# Patient Record
Sex: FEMALE | Race: BLACK OR AFRICAN AMERICAN | NOT HISPANIC OR LATINO | URBAN - METROPOLITAN AREA
[De-identification: names, ages, dates, MRNs, and addresses within clinical notes are randomized per-mention and may not be internally consistent; named-entity substitution may affect disease eponyms.]

---

## 2017-05-03 ENCOUNTER — EMERGENCY (EMERGENCY)
Facility: HOSPITAL | Age: 67
LOS: 1 days | Discharge: HOME | End: 2017-05-03
Attending: EMERGENCY MEDICINE
Payer: MEDICARE

## 2017-05-03 VITALS
OXYGEN SATURATION: 98 % | TEMPERATURE: 97 F | WEIGHT: 250 LBS | DIASTOLIC BLOOD PRESSURE: 115 MMHG | HEART RATE: 73 BPM | RESPIRATION RATE: 20 BRPM | SYSTOLIC BLOOD PRESSURE: 183 MMHG | HEIGHT: 62 IN

## 2017-05-03 DIAGNOSIS — Z90.10 ACQUIRED ABSENCE OF UNSPECIFIED BREAST AND NIPPLE: Chronic | ICD-10-CM

## 2017-05-03 DIAGNOSIS — I10 ESSENTIAL (PRIMARY) HYPERTENSION: ICD-10-CM

## 2017-05-03 DIAGNOSIS — Z79.82 LONG TERM (CURRENT) USE OF ASPIRIN: ICD-10-CM

## 2017-05-03 DIAGNOSIS — Z98.89 OTHER SPECIFIED POSTPROCEDURAL STATES: Chronic | ICD-10-CM

## 2017-05-03 DIAGNOSIS — E16.2 HYPOGLYCEMIA, UNSPECIFIED: ICD-10-CM

## 2017-05-03 DIAGNOSIS — E11.649 TYPE 2 DIABETES MELLITUS WITH HYPOGLYCEMIA WITHOUT COMA: ICD-10-CM

## 2017-05-03 DIAGNOSIS — Z90.89 ACQUIRED ABSENCE OF OTHER ORGANS: ICD-10-CM

## 2017-05-03 DIAGNOSIS — Z90.49 ACQUIRED ABSENCE OF OTHER SPECIFIED PARTS OF DIGESTIVE TRACT: ICD-10-CM

## 2017-05-03 DIAGNOSIS — E78.00 PURE HYPERCHOLESTEROLEMIA, UNSPECIFIED: ICD-10-CM

## 2017-05-03 DIAGNOSIS — Z95.5 PRESENCE OF CORONARY ANGIOPLASTY IMPLANT AND GRAFT: ICD-10-CM

## 2017-05-03 DIAGNOSIS — E78.5 HYPERLIPIDEMIA, UNSPECIFIED: ICD-10-CM

## 2017-05-03 DIAGNOSIS — I25.10 ATHEROSCLEROTIC HEART DISEASE OF NATIVE CORONARY ARTERY WITHOUT ANGINA PECTORIS: ICD-10-CM

## 2017-05-03 DIAGNOSIS — Z95.1 PRESENCE OF AORTOCORONARY BYPASS GRAFT: Chronic | ICD-10-CM

## 2017-05-03 PROCEDURE — 99283 EMERGENCY DEPT VISIT LOW MDM: CPT

## 2017-05-03 NOTE — ED ADULT NURSE NOTE - CHIEF COMPLAINT QUOTE
pt johnny from doctors office, became AMS at office, blood sugar was 22, received 1 amp d 50 by ems, 224 in triage

## 2017-05-03 NOTE — ED ADULT NURSE REASSESSMENT NOTE - NS ED NURSE REASSESS COMMENT FT1
pt cleared to be discharged hopme. instructed to follow up with PMd and to return to the er if her symptoms worsens or reoccurs. pt stated her understanding

## 2017-05-03 NOTE — ED ADULT NURSE NOTE - PSH
S/P appendectomy    S/P breast reconstruction, bilateral    S/P CABG x 4    S/P cardiac cath  with stents  S/P mastectomy    S/P tonsillectomy

## 2017-05-03 NOTE — ED ADULT NURSE NOTE - OBJECTIVE STATEMENT
pt states she felt dizzy and could not think straight this morning.  pt reports blood glucose of 22 at her dr's office

## 2017-05-05 NOTE — ED PROVIDER NOTE - PHYSICAL EXAMINATION
neuro: CN II - XII intact, EOMI, PERRL, no papilledema, 5/5 muscle strength x 4 extremities, no sensory deficits, 2+ dtr globally, negative babinski, no ataxic gait, normal JJ and FNT, normal romberg

## 2017-05-05 NOTE — ED PROVIDER NOTE - PMH
Breast cancer    Coronary artery disease    CVA (cerebral vascular accident)    Diabetes mellitus    Glaucoma    HTN (hypertension)    Hypercholesterolemia    Hypothyroid

## 2017-05-05 NOTE — ED PROVIDER NOTE - MEDICAL DECISION MAKING DETAILS
resolved hypoglycemia in nad tolerating po fluids return to ed for intractable HA persistent vomiting or new onset motor or sensory deficits.

## 2018-05-23 ENCOUNTER — OUTPATIENT (OUTPATIENT)
Dept: OUTPATIENT SERVICES | Facility: HOSPITAL | Age: 68
LOS: 1 days | End: 2018-05-23
Payer: MEDICARE

## 2018-05-23 DIAGNOSIS — Z98.89 OTHER SPECIFIED POSTPROCEDURAL STATES: Chronic | ICD-10-CM

## 2018-05-23 DIAGNOSIS — Z95.1 PRESENCE OF AORTOCORONARY BYPASS GRAFT: Chronic | ICD-10-CM

## 2018-05-23 DIAGNOSIS — Z90.10 ACQUIRED ABSENCE OF UNSPECIFIED BREAST AND NIPPLE: Chronic | ICD-10-CM

## 2018-05-23 DIAGNOSIS — R07.9 CHEST PAIN, UNSPECIFIED: ICD-10-CM

## 2018-05-23 PROCEDURE — A9500: CPT

## 2018-05-23 PROCEDURE — 78452 HT MUSCLE IMAGE SPECT MULT: CPT | Mod: 26

## 2018-05-23 PROCEDURE — 93016 CV STRESS TEST SUPVJ ONLY: CPT

## 2018-05-23 PROCEDURE — 78452 HT MUSCLE IMAGE SPECT MULT: CPT

## 2018-05-23 PROCEDURE — 93018 CV STRESS TEST I&R ONLY: CPT

## 2018-05-23 PROCEDURE — 93017 CV STRESS TEST TRACING ONLY: CPT

## 2018-11-05 ENCOUNTER — INPATIENT (INPATIENT)
Facility: HOSPITAL | Age: 68
LOS: 3 days | Discharge: HOME | DRG: 286 | End: 2018-11-09
Attending: INTERNAL MEDICINE | Admitting: INTERNAL MEDICINE
Payer: MEDICARE

## 2018-11-05 VITALS
DIASTOLIC BLOOD PRESSURE: 135 MMHG | SYSTOLIC BLOOD PRESSURE: 186 MMHG | RESPIRATION RATE: 20 BRPM | HEART RATE: 61 BPM | TEMPERATURE: 99 F | HEIGHT: 62 IN | WEIGHT: 203.93 LBS | OXYGEN SATURATION: 100 %

## 2018-11-05 DIAGNOSIS — Z95.1 PRESENCE OF AORTOCORONARY BYPASS GRAFT: Chronic | ICD-10-CM

## 2018-11-05 DIAGNOSIS — Z98.89 OTHER SPECIFIED POSTPROCEDURAL STATES: Chronic | ICD-10-CM

## 2018-11-05 DIAGNOSIS — Z90.10 ACQUIRED ABSENCE OF UNSPECIFIED BREAST AND NIPPLE: Chronic | ICD-10-CM

## 2018-11-05 LAB
ALBUMIN SERPL ELPH-MCNC: 3.8 G/DL — SIGNIFICANT CHANGE UP (ref 3.3–5.2)
ALP SERPL-CCNC: 104 U/L — SIGNIFICANT CHANGE UP (ref 40–120)
ALT FLD-CCNC: 10 U/L — SIGNIFICANT CHANGE UP
ANION GAP SERPL CALC-SCNC: 13 MMOL/L — SIGNIFICANT CHANGE UP (ref 5–17)
APPEARANCE UR: CLEAR — SIGNIFICANT CHANGE UP
APTT BLD: 36 SEC — SIGNIFICANT CHANGE UP (ref 27.5–36.3)
AST SERPL-CCNC: 16 U/L — SIGNIFICANT CHANGE UP
BASOPHILS # BLD AUTO: 0 K/UL — SIGNIFICANT CHANGE UP (ref 0–0.2)
BASOPHILS NFR BLD AUTO: 0.3 % — SIGNIFICANT CHANGE UP (ref 0–2)
BILIRUB SERPL-MCNC: 0.2 MG/DL — LOW (ref 0.4–2)
BILIRUB UR-MCNC: NEGATIVE — SIGNIFICANT CHANGE UP
BUN SERPL-MCNC: 21 MG/DL — HIGH (ref 8–20)
CALCIUM SERPL-MCNC: 9.5 MG/DL — SIGNIFICANT CHANGE UP (ref 8.6–10.2)
CHLORIDE SERPL-SCNC: 105 MMOL/L — SIGNIFICANT CHANGE UP (ref 98–107)
CO2 SERPL-SCNC: 25 MMOL/L — SIGNIFICANT CHANGE UP (ref 22–29)
COLOR SPEC: YELLOW — SIGNIFICANT CHANGE UP
CREAT SERPL-MCNC: 0.89 MG/DL — SIGNIFICANT CHANGE UP (ref 0.5–1.3)
DIFF PNL FLD: NEGATIVE — SIGNIFICANT CHANGE UP
EOSINOPHIL # BLD AUTO: 0.1 K/UL — SIGNIFICANT CHANGE UP (ref 0–0.5)
EOSINOPHIL NFR BLD AUTO: 1.6 % — SIGNIFICANT CHANGE UP (ref 0–6)
GLUCOSE SERPL-MCNC: 206 MG/DL — HIGH (ref 70–115)
GLUCOSE UR QL: NEGATIVE MG/DL — SIGNIFICANT CHANGE UP
HCT VFR BLD CALC: 37 % — SIGNIFICANT CHANGE UP (ref 37–47)
HGB BLD-MCNC: 12.2 G/DL — SIGNIFICANT CHANGE UP (ref 12–16)
INR BLD: 0.99 RATIO — SIGNIFICANT CHANGE UP (ref 0.88–1.16)
KETONES UR-MCNC: NEGATIVE — SIGNIFICANT CHANGE UP
LEUKOCYTE ESTERASE UR-ACNC: NEGATIVE — SIGNIFICANT CHANGE UP
LYMPHOCYTES # BLD AUTO: 2.3 K/UL — SIGNIFICANT CHANGE UP (ref 1–4.8)
LYMPHOCYTES # BLD AUTO: 32.6 % — SIGNIFICANT CHANGE UP (ref 20–55)
MAGNESIUM SERPL-MCNC: 2 MG/DL — SIGNIFICANT CHANGE UP (ref 1.6–2.6)
MCHC RBC-ENTMCNC: 29 PG — SIGNIFICANT CHANGE UP (ref 27–31)
MCHC RBC-ENTMCNC: 33 G/DL — SIGNIFICANT CHANGE UP (ref 32–36)
MCV RBC AUTO: 88.1 FL — SIGNIFICANT CHANGE UP (ref 81–99)
MONOCYTES # BLD AUTO: 0.4 K/UL — SIGNIFICANT CHANGE UP (ref 0–0.8)
MONOCYTES NFR BLD AUTO: 5.9 % — SIGNIFICANT CHANGE UP (ref 3–10)
NEUTROPHILS # BLD AUTO: 4.1 K/UL — SIGNIFICANT CHANGE UP (ref 1.8–8)
NEUTROPHILS NFR BLD AUTO: 59.5 % — SIGNIFICANT CHANGE UP (ref 37–73)
NITRITE UR-MCNC: NEGATIVE — SIGNIFICANT CHANGE UP
PH UR: 6 — SIGNIFICANT CHANGE UP (ref 5–8)
PLATELET # BLD AUTO: 140 K/UL — LOW (ref 150–400)
POTASSIUM SERPL-MCNC: 3.6 MMOL/L — SIGNIFICANT CHANGE UP (ref 3.5–5.3)
POTASSIUM SERPL-SCNC: 3.6 MMOL/L — SIGNIFICANT CHANGE UP (ref 3.5–5.3)
PROT SERPL-MCNC: 7.2 G/DL — SIGNIFICANT CHANGE UP (ref 6.6–8.7)
PROT UR-MCNC: 30 MG/DL
PROTHROM AB SERPL-ACNC: 11.4 SEC — SIGNIFICANT CHANGE UP (ref 10–12.9)
RBC # BLD: 4.2 M/UL — LOW (ref 4.4–5.2)
RBC # FLD: 14.7 % — SIGNIFICANT CHANGE UP (ref 11–15.6)
SODIUM SERPL-SCNC: 143 MMOL/L — SIGNIFICANT CHANGE UP (ref 135–145)
SP GR SPEC: 1.01 — SIGNIFICANT CHANGE UP (ref 1.01–1.02)
TROPONIN T SERPL-MCNC: <0.01 NG/ML — SIGNIFICANT CHANGE UP (ref 0–0.06)
UROBILINOGEN FLD QL: NEGATIVE MG/DL — SIGNIFICANT CHANGE UP
WBC # BLD: 7 K/UL — SIGNIFICANT CHANGE UP (ref 4.8–10.8)
WBC # FLD AUTO: 7 K/UL — SIGNIFICANT CHANGE UP (ref 4.8–10.8)

## 2018-11-05 PROCEDURE — 93459 L HRT ART/GRFT ANGIO: CPT | Mod: 26

## 2018-11-05 PROCEDURE — 93010 ELECTROCARDIOGRAM REPORT: CPT

## 2018-11-05 PROCEDURE — 71045 X-RAY EXAM CHEST 1 VIEW: CPT | Mod: 26

## 2018-11-05 PROCEDURE — 76937 US GUIDE VASCULAR ACCESS: CPT | Mod: 26

## 2018-11-05 PROCEDURE — 99223 1ST HOSP IP/OBS HIGH 75: CPT | Mod: GC

## 2018-11-05 PROCEDURE — 99152 MOD SED SAME PHYS/QHP 5/>YRS: CPT

## 2018-11-05 PROCEDURE — 99284 EMERGENCY DEPT VISIT MOD MDM: CPT

## 2018-11-05 RX ORDER — HYDRALAZINE HCL 50 MG
10 TABLET ORAL ONCE
Qty: 0 | Refills: 0 | Status: COMPLETED | OUTPATIENT
Start: 2018-11-05 | End: 2018-11-05

## 2018-11-05 RX ORDER — CARVEDILOL PHOSPHATE 80 MG/1
25 CAPSULE, EXTENDED RELEASE ORAL ONCE
Qty: 0 | Refills: 0 | Status: COMPLETED | OUTPATIENT
Start: 2018-11-05 | End: 2018-11-05

## 2018-11-05 RX ORDER — AMLODIPINE BESYLATE 2.5 MG/1
5 TABLET ORAL ONCE
Qty: 0 | Refills: 0 | Status: COMPLETED | OUTPATIENT
Start: 2018-11-05 | End: 2018-11-05

## 2018-11-05 RX ADMIN — Medication 10 MILLIGRAM(S): at 21:37

## 2018-11-05 RX ADMIN — AMLODIPINE BESYLATE 5 MILLIGRAM(S): 2.5 TABLET ORAL at 22:56

## 2018-11-05 RX ADMIN — Medication 10 MILLIGRAM(S): at 22:57

## 2018-11-05 RX ADMIN — CARVEDILOL PHOSPHATE 25 MILLIGRAM(S): 80 CAPSULE, EXTENDED RELEASE ORAL at 22:56

## 2018-11-05 NOTE — H&P ADULT - NSHPSOCIALHISTORY_GEN_ALL_CORE
Quit smoking 30-40 years ago. Smoked 3/4 ppd prior to this. Denies use of alcohol or rec drugs.  Lives with her , daughter and grandchildren  At her baseline she ambulates independently

## 2018-11-05 NOTE — ED ADULT NURSE NOTE - OBJECTIVE STATEMENT
assumed care of patient at 2100, alert and oriented x4, c/o chest  pain since yesterday. Patient denies n/v/d, denies pain radiating. Pt placed on CM, sinus bradycardia noted to monitor. HR 55, Dr Armando aware. Patient hypertensive, Dr Armando aware and at bedside. Patient asymptomatic, denies headache blurry vision or dizziness.

## 2018-11-05 NOTE — ED ADULT TRIAGE NOTE - CHIEF COMPLAINT QUOTE
Patient arrived to ED today with c/o chest pain since this AM and an episode yesterday AM that was relieved with Aspirin.

## 2018-11-05 NOTE — ED ADULT NURSE NOTE - CHPI ED NUR SYMPTOMS NEG
no congestion/no vomiting/no back pain/no shortness of breath/no nausea/no diaphoresis/no dizziness/no fever/no syncope/no chills

## 2018-11-05 NOTE — ED ADULT NURSE NOTE - CHIEF COMPLAINT QUOTE
Patient arrived to ED today with c/o chest pain since this AM and an episode yesterday AM that was relieved with Aspirin.
Yes

## 2018-11-05 NOTE — ED STATDOCS - OBJECTIVE STATEMENT
Telemedicine assessment was conducted (using real time 2 way audio-video technology) by Dr. Cj Saeed located at [address] [place] [address]  ++++++++++++++++++++++++  Pertinent patient history and initial plan:   69 y/o F, with DM, HTN, HLD, hypothyroid, CVA, CABG, MI (2013), and 4 coronary artery stents, currently on Plavix and 81mg Aspirin, presents to the ED c/o constant chest pain, onset yesterday.  Pain is non radiating.  Pt states that she was getting ready for [employer] when pain began.  Pt states that her BP is not usually as high as it is today.  Denies SOB, N/V, dizziness, or visual changes.    Cardiolgist: Dr. RENEE ([facility]) Telemedicine assessment was conducted (using real time 2 way audio-video technology) by Dr. Cj Saeed located at [address] [place] [address]  ++++++++++++++++++++++++  Pertinent patient history and initial plan:   67 y/o F, with DM, HTN, HLD, hypothyroid, CVA, CABG, MI (2013), and 4 coronary artery stents, currently on Plavix and 81mg Aspirin, presents to the ED c/o constant chest pain, onset yesterday.  Pain is non radiating.  Pt states that she was getting ready for [employer] when pain began.  Pt states that her BP is not usually as high as it is today.  Denies SOB, N/V, dizziness, or visual changes.    Cardiolgist: Dr. RENEE ([facility])      ekg, labs, cxr  Patient seen by me in intake for initial assessment and ordering. Physician on site to follow results and further evaluate and treat patient.

## 2018-11-05 NOTE — H&P ADULT - NSHPREVIEWOFSYSTEMS_GEN_ALL_CORE
She denies any recent fever, sick contacts, vomiting, diarrhea, constipation, diarrhea, or hematuria.

## 2018-11-05 NOTE — H&P ADULT - HISTORY OF PRESENT ILLNESS
67 yo female with PMH of HTN, CAD, CABG (2013), HLD, Hypothyroidism, DMII on insulin, CVA (2004, 2013) w/ residual R lower facial droop, Breast Cancer s/p mastectomy 1984, glaucoma and blindness in Left eye presents with CP which began yesterday morning. She states the pain was 8/10, center of her chest, dull and stabby, moved to her shoulder and neck, worsened with movement, no alleviating factors, unsure if it is similar to her prior CAD pain. She stated at the time her heart was also beating very fast and she was sweating profusely for about 3 mins. She admitted to nausea but did not vomit. She took a 325mg Aspirin which helped her pain and then went to Religious. Since then she has been having this chest pain and now decided to come to the hospital, brought by her daughter. Patient states she walks 1/2 a block and gets SOB, gradually worsening over the last few months, but denies orthopnea and PND. She does state her ankles get swollen at the end of the day. She states she had a HA when she came in but now it has resolved. Both her and her daughter noticed her speech was labored today. She denies any weakness or numbness anywhere else. She also has chronic neuropathy in b/l feet, worse on R.  She also states she began feeling burning on urination today.  Patient also complains of having pain w/ each meal, has to eat small bites at a time. States she has lots of GERD, but denies any trouble swallowing. She had an endoscopy and colonoscopy last year and her GI was planning on repeating them this year to further evaluate the odynophagia but was pending cardiac clearance.

## 2018-11-05 NOTE — ED ADULT NURSE REASSESSMENT NOTE - NS ED NURSE REASSESS COMMENT FT1
BP remains elevated, Dr Armando made aware, Medications given per Md order, Pt now c/o 6/10 headache. Dr Armando made aware. Safety maintained, Will continue to monitor.

## 2018-11-05 NOTE — ED PROVIDER NOTE - OBJECTIVE STATEMENT
69 yo female with significant cardiac history presents for evaluation of constant left sided chest pain since the morning of 11/4/18. Patient states she took aspirin which helped but the pain did not resolve. Patient states pain is worse with movement and palpation.  PMD: Zach  Cardio: Nazia

## 2018-11-05 NOTE — H&P ADULT - ASSESSMENT
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  Admit to Resident service under care of Dr Cantrell  Admit to Step Down unit  Activity: ambulate with assistance  Diet: DASH/TLC, consistent carb    Hypertensive Emergency  Elevated SBP of 223  R/o end organ damage with CP, HA and new onset dysarthria  EKG: NSR w/ minor ST elevation in lead III, not seen on repeat EKG  Trop neg x1, will trend  s/p Norvasc 5mg PO, Coreg 25mg PO, Hydralazine 10mg IV x2  Continue home meds, Lasix 40mg BID, Coreg at half dose (12.5mg BID), Norvasc 10mg QD  Hydralazine and HCTZ held  Cardio consult pending, [facility]    New onset Dysarthria  Started >5 hours ago  Prior hx of CVA  CT Head: neg for acute ischemic or hemorrhagic infarct,   No other focal neurological deficits  Outside of window for tPA  MRI Head pending    MENEZES possible CHF  Gradually worsening, exercise tolerance down to 1/2 block  LE edema  Stress test from 05/2018: Post stress LVEF 76%  TTE pending    Diabetes Mellitus  Continue home meds, Lantus reduced to 15U  Premeal insulin 4U  Accuchecks premeal + QHS  Moderate insulin SS  HBA1c pending    Hiatal Hernia  Moderate in size as seen on CT Abd  Pain with ingestion of food  Continue Protonix 40mg QD  Surgery consult?    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  Admit to Resident service under care of Dr Cantrell  Admit to Step Down unit  Activity: ambulate with assistance  Diet: DASH/TLC, consistent carb    Hypertensive Emergency  Elevated SBP of 223  R/o end organ damage with CP, HA and new onset dysarthria  EKG: NSR w/ minor ST elevation in lead III, not seen on repeat EKG  Trop neg x1, will trend  s/p Norvasc 5mg PO, Coreg 25mg PO, Hydralazine 10mg IV x2  Continue home meds, Lasix 40mg BID, Coreg at half dose (12.5mg BID), Norvasc 10mg QD  Hydralazine and HCTZ held  Cardio consult pending, [facility]    New onset Dysarthria  Started >5 hours ago  Prior hx of CVA  CT Head: neg for acute ischemic or hemorrhagic infarct,   No other focal neurological deficits  Outside of window for tPA  MRI Head pending    MENEZES possible CHF  Gradually worsening, exercise tolerance down to 1/2 block  LE edema  Stress test from 05/2018: Post stress LVEF 76%  TTE pending    Diabetes Mellitus  Continue home meds, Lantus reduced to 15U, Premeal insulin 4U  Accuchecks premeal + QHS  Moderate insulin SS  HBA1c pending  B/l peripheral diabetic neuropathy, continue Gabapentin 100mg BID    Hiatal Hernia  Moderate in size as seen on CT Abd  Pain with ingestion of food  Continue Protonix 40mg QD  Surgery consult?    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  Admit to Resident service under care of Dr Cantrell  Admit to Step Down unit  Activity: ambulate with assistance  Diet: DASH/TLC, consistent carb    Hypertensive Emergency  Elevated SBP of 223  R/o end organ damage with CP, HA and new onset dysarthria  EKG: NSR w/ minor ST elevation in lead III, not seen on repeat EKG  Trop neg x1, will trend  s/p Norvasc 5mg PO, Coreg 25mg PO, Hydralazine 10mg IV x2  Continue home meds, Lasix 40mg BID, Coreg at half dose (12.5mg BID), Norvasc 10mg QD  Hydralazine and HCTZ held  Cardio consult pending, [facility]    New onset Dysarthria  Started >5 hours ago  Prior hx of CVA  CT Head: neg for acute ischemic or hemorrhagic infarct,   No other focal neurological deficits  Outside of window for tPA  MRI Head pending  Neuro consult pending, [facility]    MENEZES possible CHF  Gradually worsening, exercise tolerance down to 1/2 block  LE edema  Stress test from 05/2018: Post stress LVEF 76%  TTE pending    Diabetes Mellitus  Continue home meds, Lantus reduced to 15U, Premeal insulin 4U  Accuchecks premeal + QHS  Moderate insulin SS  HBA1c pending  B/l peripheral diabetic neuropathy, continue Gabapentin 100mg BID    Hiatal Hernia  Moderate in size as seen on CT Abd  Pain with ingestion of food  Continue Protonix 40mg QD  Surgery consult?    CAD s/p CABG  Continue ASA and Plavix    Hypothyroidism  Continue home med, Synthroid 137mcg QD  TSH pending    Preventative Measure  Heparin 5000U Q8h

## 2018-11-06 DIAGNOSIS — I10 ESSENTIAL (PRIMARY) HYPERTENSION: ICD-10-CM

## 2018-11-06 LAB
ANION GAP SERPL CALC-SCNC: 12 MMOL/L — SIGNIFICANT CHANGE UP (ref 5–17)
BASOPHILS # BLD AUTO: 0 K/UL — SIGNIFICANT CHANGE UP (ref 0–0.2)
BASOPHILS NFR BLD AUTO: 0.3 % — SIGNIFICANT CHANGE UP (ref 0–2)
BUN SERPL-MCNC: 17 MG/DL — SIGNIFICANT CHANGE UP (ref 8–20)
CALCIUM SERPL-MCNC: 9.4 MG/DL — SIGNIFICANT CHANGE UP (ref 8.6–10.2)
CHLORIDE SERPL-SCNC: 104 MMOL/L — SIGNIFICANT CHANGE UP (ref 98–107)
CHOLEST SERPL-MCNC: 186 MG/DL — SIGNIFICANT CHANGE UP (ref 110–199)
CK SERPL-CCNC: 94 U/L — SIGNIFICANT CHANGE UP (ref 25–170)
CK SERPL-CCNC: 96 U/L — SIGNIFICANT CHANGE UP (ref 25–170)
CO2 SERPL-SCNC: 25 MMOL/L — SIGNIFICANT CHANGE UP (ref 22–29)
CREAT SERPL-MCNC: 0.86 MG/DL — SIGNIFICANT CHANGE UP (ref 0.5–1.3)
EOSINOPHIL # BLD AUTO: 0.1 K/UL — SIGNIFICANT CHANGE UP (ref 0–0.5)
EOSINOPHIL NFR BLD AUTO: 1.1 % — SIGNIFICANT CHANGE UP (ref 0–6)
GLUCOSE BLDC GLUCOMTR-MCNC: 135 MG/DL — HIGH (ref 70–99)
GLUCOSE BLDC GLUCOMTR-MCNC: 189 MG/DL — HIGH (ref 70–99)
GLUCOSE BLDC GLUCOMTR-MCNC: 261 MG/DL — HIGH (ref 70–99)
GLUCOSE BLDC GLUCOMTR-MCNC: 69 MG/DL — LOW (ref 70–99)
GLUCOSE SERPL-MCNC: 207 MG/DL — HIGH (ref 70–115)
HBA1C BLD-MCNC: 8.9 % — HIGH (ref 4–5.6)
HCT VFR BLD CALC: 37.5 % — SIGNIFICANT CHANGE UP (ref 37–47)
HDLC SERPL-MCNC: 66 MG/DL — SIGNIFICANT CHANGE UP
HGB BLD-MCNC: 12.2 G/DL — SIGNIFICANT CHANGE UP (ref 12–16)
LIPID PNL WITH DIRECT LDL SERPL: 111 MG/DL — SIGNIFICANT CHANGE UP
LYMPHOCYTES # BLD AUTO: 2.1 K/UL — SIGNIFICANT CHANGE UP (ref 1–4.8)
LYMPHOCYTES # BLD AUTO: 29.8 % — SIGNIFICANT CHANGE UP (ref 20–55)
MAGNESIUM SERPL-MCNC: 1.9 MG/DL — SIGNIFICANT CHANGE UP (ref 1.6–2.6)
MCHC RBC-ENTMCNC: 28.5 PG — SIGNIFICANT CHANGE UP (ref 27–31)
MCHC RBC-ENTMCNC: 32.5 G/DL — SIGNIFICANT CHANGE UP (ref 32–36)
MCV RBC AUTO: 87.6 FL — SIGNIFICANT CHANGE UP (ref 81–99)
MONOCYTES # BLD AUTO: 0.4 K/UL — SIGNIFICANT CHANGE UP (ref 0–0.8)
MONOCYTES NFR BLD AUTO: 5.2 % — SIGNIFICANT CHANGE UP (ref 3–10)
NEUTROPHILS # BLD AUTO: 4.5 K/UL — SIGNIFICANT CHANGE UP (ref 1.8–8)
NEUTROPHILS NFR BLD AUTO: 63.5 % — SIGNIFICANT CHANGE UP (ref 37–73)
PHOSPHATE SERPL-MCNC: 3 MG/DL — SIGNIFICANT CHANGE UP (ref 2.4–4.7)
PLATELET # BLD AUTO: 145 K/UL — LOW (ref 150–400)
POTASSIUM SERPL-MCNC: 3.6 MMOL/L — SIGNIFICANT CHANGE UP (ref 3.5–5.3)
POTASSIUM SERPL-SCNC: 3.6 MMOL/L — SIGNIFICANT CHANGE UP (ref 3.5–5.3)
RBC # BLD: 4.28 M/UL — LOW (ref 4.4–5.2)
RBC # FLD: 14.8 % — SIGNIFICANT CHANGE UP (ref 11–15.6)
SODIUM SERPL-SCNC: 141 MMOL/L — SIGNIFICANT CHANGE UP (ref 135–145)
TOTAL CHOLESTEROL/HDL RATIO MEASUREMENT: 3 RATIO — LOW (ref 3.3–7.1)
TRIGL SERPL-MCNC: 45 MG/DL — SIGNIFICANT CHANGE UP (ref 10–200)
TROPONIN T SERPL-MCNC: <0.01 NG/ML — SIGNIFICANT CHANGE UP (ref 0–0.06)
TROPONIN T SERPL-MCNC: <0.01 NG/ML — SIGNIFICANT CHANGE UP (ref 0–0.06)
TSH SERPL-MCNC: 8.92 UIU/ML — HIGH (ref 0.27–4.2)
WBC # BLD: 7.1 K/UL — SIGNIFICANT CHANGE UP (ref 4.8–10.8)
WBC # FLD AUTO: 7.1 K/UL — SIGNIFICANT CHANGE UP (ref 4.8–10.8)

## 2018-11-06 PROCEDURE — 99223 1ST HOSP IP/OBS HIGH 75: CPT

## 2018-11-06 PROCEDURE — 93010 ELECTROCARDIOGRAM REPORT: CPT

## 2018-11-06 PROCEDURE — 99233 SBSQ HOSP IP/OBS HIGH 50: CPT | Mod: GC

## 2018-11-06 PROCEDURE — 70450 CT HEAD/BRAIN W/O DYE: CPT | Mod: 26

## 2018-11-06 PROCEDURE — 71275 CT ANGIOGRAPHY CHEST: CPT | Mod: 26

## 2018-11-06 PROCEDURE — 74176 CT ABD & PELVIS W/O CONTRAST: CPT | Mod: 26,59

## 2018-11-06 PROCEDURE — 74174 CTA ABD&PLVS W/CONTRAST: CPT | Mod: 26

## 2018-11-06 PROCEDURE — 99222 1ST HOSP IP/OBS MODERATE 55: CPT

## 2018-11-06 RX ORDER — LEVOTHYROXINE SODIUM 125 MCG
137 TABLET ORAL DAILY
Qty: 0 | Refills: 0 | Status: DISCONTINUED | OUTPATIENT
Start: 2018-11-06 | End: 2018-11-08

## 2018-11-06 RX ORDER — INSULIN LISPRO 100/ML
VIAL (ML) SUBCUTANEOUS
Qty: 0 | Refills: 0 | Status: DISCONTINUED | OUTPATIENT
Start: 2018-11-06 | End: 2018-11-09

## 2018-11-06 RX ORDER — GLUCAGON INJECTION, SOLUTION 0.5 MG/.1ML
1 INJECTION, SOLUTION SUBCUTANEOUS ONCE
Qty: 0 | Refills: 0 | Status: DISCONTINUED | OUTPATIENT
Start: 2018-11-06 | End: 2018-11-09

## 2018-11-06 RX ORDER — NITROGLYCERIN 6.5 MG
0.5 CAPSULE, EXTENDED RELEASE ORAL DAILY
Qty: 0 | Refills: 0 | Status: DISCONTINUED | OUTPATIENT
Start: 2018-11-06 | End: 2018-11-06

## 2018-11-06 RX ORDER — INSULIN GLARGINE 100 [IU]/ML
15 INJECTION, SOLUTION SUBCUTANEOUS AT BEDTIME
Qty: 0 | Refills: 0 | Status: DISCONTINUED | OUTPATIENT
Start: 2018-11-06 | End: 2018-11-07

## 2018-11-06 RX ORDER — PANTOPRAZOLE SODIUM 20 MG/1
40 TABLET, DELAYED RELEASE ORAL
Qty: 0 | Refills: 0 | Status: DISCONTINUED | OUTPATIENT
Start: 2018-11-06 | End: 2018-11-09

## 2018-11-06 RX ORDER — AMLODIPINE BESYLATE 2.5 MG/1
10 TABLET ORAL DAILY
Qty: 0 | Refills: 0 | Status: DISCONTINUED | OUTPATIENT
Start: 2018-11-06 | End: 2018-11-09

## 2018-11-06 RX ORDER — SODIUM CHLORIDE 9 MG/ML
500 INJECTION INTRAMUSCULAR; INTRAVENOUS; SUBCUTANEOUS ONCE
Qty: 0 | Refills: 0 | Status: COMPLETED | OUTPATIENT
Start: 2018-11-06 | End: 2018-11-06

## 2018-11-06 RX ORDER — DEXTROSE 50 % IN WATER 50 %
25 SYRINGE (ML) INTRAVENOUS ONCE
Qty: 0 | Refills: 0 | Status: DISCONTINUED | OUTPATIENT
Start: 2018-11-06 | End: 2018-11-09

## 2018-11-06 RX ORDER — ACETAMINOPHEN 500 MG
650 TABLET ORAL ONCE
Qty: 0 | Refills: 0 | Status: COMPLETED | OUTPATIENT
Start: 2018-11-06 | End: 2018-11-06

## 2018-11-06 RX ORDER — GABAPENTIN 400 MG/1
100 CAPSULE ORAL
Qty: 0 | Refills: 0 | Status: DISCONTINUED | OUTPATIENT
Start: 2018-11-06 | End: 2018-11-09

## 2018-11-06 RX ORDER — HEPARIN SODIUM 5000 [USP'U]/ML
5000 INJECTION INTRAVENOUS; SUBCUTANEOUS EVERY 8 HOURS
Qty: 0 | Refills: 0 | Status: DISCONTINUED | OUTPATIENT
Start: 2018-11-06 | End: 2018-11-09

## 2018-11-06 RX ORDER — FUROSEMIDE 40 MG
40 TABLET ORAL
Qty: 0 | Refills: 0 | Status: DISCONTINUED | OUTPATIENT
Start: 2018-11-06 | End: 2018-11-07

## 2018-11-06 RX ORDER — NITROGLYCERIN 6.5 MG
0.5 CAPSULE, EXTENDED RELEASE ORAL ONCE
Qty: 0 | Refills: 0 | Status: DISCONTINUED | OUTPATIENT
Start: 2018-11-06 | End: 2018-11-06

## 2018-11-06 RX ORDER — CLOPIDOGREL BISULFATE 75 MG/1
75 TABLET, FILM COATED ORAL DAILY
Qty: 0 | Refills: 0 | Status: DISCONTINUED | OUTPATIENT
Start: 2018-11-06 | End: 2018-11-06

## 2018-11-06 RX ORDER — MORPHINE SULFATE 50 MG/1
1 CAPSULE, EXTENDED RELEASE ORAL ONCE
Qty: 0 | Refills: 0 | Status: DISCONTINUED | OUTPATIENT
Start: 2018-11-06 | End: 2018-11-06

## 2018-11-06 RX ORDER — CARVEDILOL PHOSPHATE 80 MG/1
12.5 CAPSULE, EXTENDED RELEASE ORAL EVERY 12 HOURS
Qty: 0 | Refills: 0 | Status: DISCONTINUED | OUTPATIENT
Start: 2018-11-06 | End: 2018-11-09

## 2018-11-06 RX ORDER — LOSARTAN POTASSIUM 100 MG/1
50 TABLET, FILM COATED ORAL DAILY
Qty: 0 | Refills: 0 | Status: DISCONTINUED | OUTPATIENT
Start: 2018-11-06 | End: 2018-11-09

## 2018-11-06 RX ORDER — MAGNESIUM OXIDE 400 MG ORAL TABLET 241.3 MG
400 TABLET ORAL
Qty: 0 | Refills: 0 | Status: COMPLETED | OUTPATIENT
Start: 2018-11-06 | End: 2018-11-06

## 2018-11-06 RX ORDER — DEXTROSE 50 % IN WATER 50 %
15 SYRINGE (ML) INTRAVENOUS ONCE
Qty: 0 | Refills: 0 | Status: DISCONTINUED | OUTPATIENT
Start: 2018-11-06 | End: 2018-11-09

## 2018-11-06 RX ORDER — DEXTROSE 50 % IN WATER 50 %
12.5 SYRINGE (ML) INTRAVENOUS ONCE
Qty: 0 | Refills: 0 | Status: DISCONTINUED | OUTPATIENT
Start: 2018-11-06 | End: 2018-11-09

## 2018-11-06 RX ORDER — ASPIRIN/CALCIUM CARB/MAGNESIUM 324 MG
81 TABLET ORAL DAILY
Qty: 0 | Refills: 0 | Status: DISCONTINUED | OUTPATIENT
Start: 2018-11-06 | End: 2018-11-09

## 2018-11-06 RX ORDER — IBUPROFEN 200 MG
600 TABLET ORAL ONCE
Qty: 0 | Refills: 0 | Status: COMPLETED | OUTPATIENT
Start: 2018-11-06 | End: 2018-11-06

## 2018-11-06 RX ORDER — ATORVASTATIN CALCIUM 80 MG/1
40 TABLET, FILM COATED ORAL AT BEDTIME
Qty: 0 | Refills: 0 | Status: DISCONTINUED | OUTPATIENT
Start: 2018-11-06 | End: 2018-11-08

## 2018-11-06 RX ORDER — PANTOPRAZOLE SODIUM 20 MG/1
40 TABLET, DELAYED RELEASE ORAL DAILY
Qty: 0 | Refills: 0 | Status: DISCONTINUED | OUTPATIENT
Start: 2018-11-06 | End: 2018-11-06

## 2018-11-06 RX ORDER — SODIUM CHLORIDE 9 MG/ML
1000 INJECTION, SOLUTION INTRAVENOUS
Qty: 0 | Refills: 0 | Status: DISCONTINUED | OUTPATIENT
Start: 2018-11-06 | End: 2018-11-09

## 2018-11-06 RX ORDER — HYDRALAZINE HCL 50 MG
10 TABLET ORAL EVERY 6 HOURS
Qty: 0 | Refills: 0 | Status: DISCONTINUED | OUTPATIENT
Start: 2018-11-06 | End: 2018-11-09

## 2018-11-06 RX ORDER — INSULIN LISPRO 100/ML
2 VIAL (ML) SUBCUTANEOUS
Qty: 0 | Refills: 0 | Status: DISCONTINUED | OUTPATIENT
Start: 2018-11-06 | End: 2018-11-07

## 2018-11-06 RX ORDER — ACETAMINOPHEN 500 MG
650 TABLET ORAL EVERY 6 HOURS
Qty: 0 | Refills: 0 | Status: DISCONTINUED | OUTPATIENT
Start: 2018-11-06 | End: 2018-11-09

## 2018-11-06 RX ADMIN — PANTOPRAZOLE SODIUM 40 MILLIGRAM(S): 20 TABLET, DELAYED RELEASE ORAL at 18:04

## 2018-11-06 RX ADMIN — CARVEDILOL PHOSPHATE 12.5 MILLIGRAM(S): 80 CAPSULE, EXTENDED RELEASE ORAL at 18:04

## 2018-11-06 RX ADMIN — CLOPIDOGREL BISULFATE 75 MILLIGRAM(S): 75 TABLET, FILM COATED ORAL at 11:06

## 2018-11-06 RX ADMIN — MAGNESIUM OXIDE 400 MG ORAL TABLET 400 MILLIGRAM(S): 241.3 TABLET ORAL at 10:39

## 2018-11-06 RX ADMIN — Medication 650 MILLIGRAM(S): at 11:06

## 2018-11-06 RX ADMIN — AMLODIPINE BESYLATE 10 MILLIGRAM(S): 2.5 TABLET ORAL at 06:32

## 2018-11-06 RX ADMIN — Medication 137 MICROGRAM(S): at 06:32

## 2018-11-06 RX ADMIN — Medication 650 MILLIGRAM(S): at 10:51

## 2018-11-06 RX ADMIN — MAGNESIUM OXIDE 400 MG ORAL TABLET 400 MILLIGRAM(S): 241.3 TABLET ORAL at 18:04

## 2018-11-06 RX ADMIN — GABAPENTIN 100 MILLIGRAM(S): 400 CAPSULE ORAL at 06:32

## 2018-11-06 RX ADMIN — GABAPENTIN 100 MILLIGRAM(S): 400 CAPSULE ORAL at 18:04

## 2018-11-06 RX ADMIN — MORPHINE SULFATE 1 MILLIGRAM(S): 50 CAPSULE, EXTENDED RELEASE ORAL at 03:35

## 2018-11-06 RX ADMIN — HEPARIN SODIUM 5000 UNIT(S): 5000 INJECTION INTRAVENOUS; SUBCUTANEOUS at 06:32

## 2018-11-06 RX ADMIN — SODIUM CHLORIDE 2000 MILLILITER(S): 9 INJECTION INTRAMUSCULAR; INTRAVENOUS; SUBCUTANEOUS at 22:15

## 2018-11-06 RX ADMIN — MORPHINE SULFATE 1 MILLIGRAM(S): 50 CAPSULE, EXTENDED RELEASE ORAL at 03:18

## 2018-11-06 RX ADMIN — Medication 2: at 08:32

## 2018-11-06 RX ADMIN — Medication 2 UNIT(S): at 08:32

## 2018-11-06 RX ADMIN — PANTOPRAZOLE SODIUM 40 MILLIGRAM(S): 20 TABLET, DELAYED RELEASE ORAL at 11:06

## 2018-11-06 RX ADMIN — Medication 40 MILLIGRAM(S): at 18:04

## 2018-11-06 RX ADMIN — Medication 81 MILLIGRAM(S): at 11:06

## 2018-11-06 RX ADMIN — ATORVASTATIN CALCIUM 40 MILLIGRAM(S): 80 TABLET, FILM COATED ORAL at 22:16

## 2018-11-06 RX ADMIN — LOSARTAN POTASSIUM 50 MILLIGRAM(S): 100 TABLET, FILM COATED ORAL at 06:32

## 2018-11-06 RX ADMIN — Medication 40 MILLIGRAM(S): at 06:32

## 2018-11-06 NOTE — ED ADULT NURSE REASSESSMENT NOTE - NS ED NURSE REASSESS COMMENT FT1
BP improved, order for stat nitro paste received, Dr Gordon called to notify of BP prior to nitro placement. AS per MD Gordon, do not give nitro paste. Order discontinued.

## 2018-11-06 NOTE — PROGRESS NOTE ADULT - SUBJECTIVE AND OBJECTIVE BOX
Patient with recurrent symptoms of chest pain.     Patent grafts.     Patient's symptoms appear to be GI related.     Will plan for PPI bid.     DC plavix.     Moderate hiatal hernia. Will have patient be seen and evaluated for outpatient hiatal hernia surgery.

## 2018-11-06 NOTE — PROGRESS NOTE ADULT - SUBJECTIVE AND OBJECTIVE BOX
Nurse Practitioner Progress note:     HPI:  69 yo female with PMH of HTN, CAD, CABG (2013), HLD, Hypothyroidism, DMII on insulin, CVA (2004, 2013) w/ residual R lower facial droop,  Pt presented to cardiac cath lab for [facility]         T(C): 36.7 (11-06-18 @ 15:01), Max: 37 (11-05-18 @ 19:17)  HR: 72 (11-06-18 @ 15:01) (55 - 75)  BP: 137/78 (11-06-18 @ 15:01) (137/78 - 223/99)  RR: 21 (11-06-18 @ 15:01) (16 - 21)  SpO2: 97% (11-06-18 @ 11:16) (96% - 100%)    PHYSICAL EXAM:  Neurologic: Non-focal, AxOx3.  No neuro deficits  Vascular: Peripheral pulses palpable 2+ bilaterally  Procedure Site: RFA closed with Mynx device with hemostasis       PROCEDURE RESULTS:  [facility] patent grafts of all 5 bypasses   Non-obstructive disease   Noted to have Large Hiatal hernia likely cause of discomfort      ASSESSMENT/PLAN: 	  -VS, labs, diet, activity as per post cath orders  -IV hydration  -Encourage PO fluids  -Continue current medications  - Increase Protonix to 40 BID   -Plan of care  and MD and hospitalist   service   -Post cath instructions reviewed with pt., pt. verbalizes and understands instructions  -Follow-up with attending

## 2018-11-06 NOTE — OCCUPATIONAL THERAPY INITIAL EVALUATION ADULT - VISUAL ACUITY
pt denies changes with vision pt reports left eye blindness from diabetes (pt has had for many years); pt denies current changes with vision

## 2018-11-06 NOTE — CONSULT NOTE ADULT - SUBJECTIVE AND OBJECTIVE BOX
[facility]                                        Neurology at [place]                                  Shreyas Mccoy, & Shyam                                      [address]                                           [place], [place], [address]                                                [phone]        CC: Slurred speech.    HISTORY:  The patient is a 68y Female who presented with chest pain. She was noted to have some slurring of her speech initially. This was mild to moderate in intensity. It has resolved and she reports her speech is back to normal. There was no associated motor or sensory change.     She has a prior history of stroke with some right sided difficulty. This largely improved but she has some residual facial asymmetry.   She ambulates without an assistive devise at baseline.     PAST MEDICAL & SURGICAL HISTORY:  Breast cancer  Glaucoma  CVA (cerebral vascular accident)  Diabetes mellitus  Hypothyroid  Hypercholesterolemia  Coronary artery disease  HTN (hypertension)  S/P tonsillectomy  S/P appendectomy  S/P cardiac cath: with stents  S/P CABG x 4  S/P breast reconstruction, bilateral  S/P mastectomy    MEDICATION PRIOR TO ADMISSION:  Aspirin, Atorvastatin, Carvedilol, Plavix, Insulin, Pantoprazole, Levothyroxine, Losartan-HCTZ, Lasix, Neurontin, Potassium.     MEDICATIONS  (STANDING):  amLODIPine   Tablet 10 milliGRAM(s) Oral daily  aspirin  chewable 81 milliGRAM(s) Oral daily  atorvastatin 40 milliGRAM(s) Oral at bedtime  carvedilol 12.5 milliGRAM(s) Oral every 12 hours  clopidogrel Tablet 75 milliGRAM(s) Oral daily  dextrose 5%. 1000 milliLiter(s) (50 mL/Hr) IV Continuous <Continuous>  furosemide    Tablet 40 milliGRAM(s) Oral two times a day  gabapentin 100 milliGRAM(s) Oral two times a day  heparin  Injectable 5000 Unit(s) SubCutaneous every 8 hours  insulin glargine Injectable (LANTUS) 15 Unit(s) SubCutaneous at bedtime  insulin lispro (HumaLOG) corrective regimen sliding scale   SubCutaneous three times a day before meals  insulin lispro Injectable (HumaLOG) 2 Unit(s) SubCutaneous three times a day before meals  levothyroxine 137 MICROGram(s) Oral daily  losartan 50 milliGRAM(s) Oral daily  magnesium oxide 400 milliGRAM(s) Oral three times a day with meals  pantoprazole  Injectable 40 milliGRAM(s) IV Push daily    MEDICATIONS  (PRN):  acetaminophen   Tablet .. 650 milliGRAM(s) Oral every 6 hours PRN Mild Pain (1 - 3)  dextrose 40% Gel 15 Gram(s) Oral once PRN Blood Glucose LESS THAN 70 milliGRAM(s)/deciliter  glucagon  Injectable 1 milliGRAM(s) IntraMuscular once PRN Glucose LESS THAN 70 milligrams/deciliter  hydrALAZINE Injectable 10 milliGRAM(s) IV Push every 6 hours PRN SBP above 175      Allergies  No Known Allergies    SOCIAL HISTORY:  Non smoker.     FAMILY HISTORY:  Mother CVA (now )  Father .  No other history of stroke.     ROS:  Constitutional: The patient denies fevers or weight changes.  Neuro: As per HPI.  Eyes: Denies blurry vision.  Ears/nose/throat: Denies Tinnitus.   Cardiac: Denies chest pain. Denies palpitations.  Respiratory: Denies shortness of breath.  GI: Denies abdominal pain, nausea, or vomiting.  : Denies change in urinary pattern.  Integumentary: Denies rash.  Psych: Denies recent mood changes.  Heme: denies easy bleeding/bruising.    Exam:  Vital Signs Last 24 Hrs  T(C): 36.7 (2018 08:00), Max: 37 (2018 19:17)  T(F): 98.1 (2018 08:00), Max: 98.6 (2018 19:17)  HR: 64 (2018 08:00) (55 - 75)  BP: 150/79 (2018 08:00) (150/79 - 223/99)  RR: 18 (2018 08:00) (16 - 20)  SpO2: 97% (2018 08:00) (96% - 100%)  General: NAD.   Carotid bruits absent.     Mental status: The patient is awake, alert, and fully oriented. There is no aphasia.    Cranial nerves: There is no papilledema. Pupils react symmetrically to light. There is no visual field deficit to confrontation. Extraocular motion is full with no nystagmus. There is no ptosis. Facial sensation is intact. Facial musculature is asymmetric with a depression of the right nasolabial fold. Palate elevates symmetrically. Tongue is midline.    Motor: There is normal bulk and tone.  Strength is 5/5 in the right arm and leg.   Strength is 5/5 in the left arm and leg.    Sensation: Decreased in the feet.    Reflexes: Trace throughout and plantar responses are flexor.    Cerebellar: There is no dysmetria on finger to nose testing.    LABS:                         12.2   7.1   )-----------( 145      ( 2018 04:40 )             37.5       11-    141  |  104  |  17.0  ----------------------------<  207<H>  3.6   |  25.0  |  0.86    Ca    9.4      2018 04:40  Phos  3.0     -  Mg     1.9         TPro  7.2  /  Alb  3.8  /  TBili  0.2<L>  /  DBili  x   /  AST  16  /  ALT  10  /  AlkPhos  104  11-05      PT/INR - ( 2018 21:41 )   PT: 11.4 sec;   INR: 0.99 ratio         PTT - ( 2018 21:41 )  PTT:36.0 sec    RADIOLOGY   CT head images reviewed (and concur with report): There is no acute pathology.   Small hypodensity left Corona and thalamus consistent with old infarct.

## 2018-11-06 NOTE — OCCUPATIONAL THERAPY INITIAL EVALUATION ADULT - ADDITIONAL COMMENTS
Pt lives in house with 3 JORGE and no steps inside; bedroom and bathroom are on main level. Bathroom has bathtub with curtains. Pt owns RW, cane, commode, shower chair. Pt is right handed. Pt drives.

## 2018-11-06 NOTE — PROGRESS NOTE ADULT - SUBJECTIVE AND OBJECTIVE BOX
Patient is a 68y old  Female who presents with a chief complaint of Chest pain (06 Nov 2018 02:33)    OVERNIGHT EVENTS:  Patient seen and examined at bedside. Patient reports she feels a little better with the chest pain now 6/10 but still sharp and located at the center of her chest. She also complains of epigastric pain mostly with eating. She is alerted she will have to be NPO in case they decide to do a procedure tomorrow morning.     Vitals  T(C): 36.7 (11-06-18 @ 02:01), Max: 37 (11-05-18 @ 19:17)  HR: 62 (11-06-18 @ 03:14) (55 - 75)  BP: 168/81 (11-06-18 @ 03:14) (152/77 - 223/99)  RR: 18 (11-06-18 @ 03:14) (18 - 20)  SpO2: 98% (11-06-18 @ 03:14) (96% - 100%)    Physical Exam:   GENERAL: NAD, well-groomed, well-developed  	HEAD:  Atraumatic, Normocephalic  	EYES: EOMI, PERRLA, conjunctiva and sclera clear  	ENMT: No tonsillar erythema, exudates, or enlargement; Moist mucous membranes, Dentures in place, No lesions  	NECK: Supple, No JVD, Normal thyroid  	NERVOUS SYSTEM:  Alert & Oriented X3, Good concentration; Motor Strength 5/5 B/L upper and lower extremities; Mild dysarthria, Right partial lower face paralysis; CNs grossly except for the ones mentioned  	RESP: Clear to auscultation bilaterally; No rales, rhonchi, or wheezing  	CVS: Regular rate and rhythm; AS murmur, No rubs, or gallops, Midline scar, Chest pain reproducible with palpation  	GI: Large 5-6cm cystic mass under left breast, LArge but Soft abdomen, mildly tender to palpation throughout, Bowel sounds present  	EXTREMITIES:  2+ Peripheral Pulses, No clubbing, cyanosis, or edema, onychomycosis in all fingernails, swollen and pink coloration of peripheral digits of the hand  	LYMPH: No cervical or supraclavicular lymphadenopathy noted  SKIN: No rashes or lesions    Labs  CBC Full  -  ( 06 Nov 2018 04:40 )  WBC Count : 7.1 K/uL  Hemoglobin : 12.2 g/dL  Hematocrit : 37.5 %  Platelet Count - Automated : 145 K/uL  Mean Cell Volume : 87.6 fl  Mean Cell Hemoglobin : 28.5 pg  Mean Cell Hemoglobin Concentration : 32.5 g/dL  Auto Neutrophil # : 4.5 K/uL  Auto Lymphocyte # : 2.1 K/uL  Auto Monocyte # : 0.4 K/uL  Auto Eosinophil # : 0.1 K/uL  Auto Basophil # : 0.0 K/uL  Auto Neutrophil % : 63.5 %  Auto Lymphocyte % : 29.8 %  Auto Monocyte % : 5.2 %  Auto Eosinophil % : 1.1 %  Auto Basophil % : 0.3 %    11-06    141  |  104  |  17.0  ----------------------------<  207<H>  3.6   |  25.0  |  0.86    Ca    9.4      06 Nov 2018 04:40  Phos  3.0     11-06  Mg     1.9     11-06    TPro  7.2  /  Alb  3.8  /  TBili  0.2<L>  /  DBili  x   /  AST  16  /  ALT  10  /  AlkPhos  104  11-05    Troponin T, Serum (11.06.18 @ 04:40)    Troponin T, Serum: <0.01 ng/mL    MEDICATIONS  (STANDING):  amLODIPine   Tablet 10 milliGRAM(s) Oral daily  aspirin  chewable 81 milliGRAM(s) Oral daily  atorvastatin 40 milliGRAM(s) Oral at bedtime  carvedilol 12.5 milliGRAM(s) Oral every 12 hours  clopidogrel Tablet 75 milliGRAM(s) Oral daily  dextrose 5%. 1000 milliLiter(s) (50 mL/Hr) IV Continuous <Continuous>  dextrose 50% Injectable 12.5 Gram(s) IV Push once  dextrose 50% Injectable 25 Gram(s) IV Push once  dextrose 50% Injectable 25 Gram(s) IV Push once  furosemide    Tablet 40 milliGRAM(s) Oral two times a day  gabapentin 100 milliGRAM(s) Oral two times a day  heparin  Injectable 5000 Unit(s) SubCutaneous every 8 hours  insulin glargine Injectable (LANTUS) 15 Unit(s) SubCutaneous at bedtime  insulin lispro (HumaLOG) corrective regimen sliding scale   SubCutaneous three times a day before meals  insulin lispro Injectable (HumaLOG) 2 Unit(s) SubCutaneous three times a day before meals  levothyroxine 137 MICROGram(s) Oral daily  losartan 50 milliGRAM(s) Oral daily  pantoprazole  Injectable 40 milliGRAM(s) IV Push daily    MEDICATIONS  (PRN):  dextrose 40% Gel 15 Gram(s) Oral once PRN Blood Glucose LESS THAN 70 milliGRAM(s)/deciliter  glucagon  Injectable 1 milliGRAM(s) IntraMuscular once PRN Glucose LESS THAN 70 milligrams/deciliter  hydrALAZINE Injectable 10 milliGRAM(s) IV Push every 6 hours PRN SBP above 175 Patient is a 68y old  Female who presents with a chief complaint of Chest pain (06 Nov 2018 02:33)    OVERNIGHT EVENTS:  Patient seen and examined at bedside. Patient reports she feels a little better with the chest pain now 6/10 but still sharp and located at the center of her chest. She also complains of epigastric pain mostly with eating. She is alerted she will have to be NPO in case they decide to do a procedure tomorrow morning.     ROS: No light headedness/dizziness, difficulty breathing/cough, fevers/chills, abdominal pain, n/v, diarrhea/constipation, dysuria or increased urinary frequency. All other ROS negative     Vitals  T(C): 36.7 (11-06-18 @ 02:01), Max: 37 (11-05-18 @ 19:17)  HR: 62 (11-06-18 @ 03:14) (55 - 75)  BP: 168/81 (11-06-18 @ 03:14) (152/77 - 223/99)  RR: 18 (11-06-18 @ 03:14) (18 - 20)  SpO2: 98% (11-06-18 @ 03:14) (96% - 100%)    Physical Exam:   GENERAL: NAD, well-groomed, well-developed  HEENT: eomi, perrla ncat   NERVOUS SYSTEM:  Alert & Oriented X3, Good concentration; Motor Strength 5/5 B/L upper and lower extremities; Mild dysarthria, Right partial lower face paralysis; CNs grossly except for the ones mentioned  RESP: Clear to auscultation bilaterally; No rales, rhonchi, or wheezing  CVS: Regular rate and rhythm; AS murmur, No rubs, or gallops, Midline scar, Chest pain reproducible with palpation  GI: Large 5-6cm cystic mass under left breast, LArge but Soft abdomen, mildly tender to palpation throughout, Bowel sounds present  EXTREMITIES:  2+ Peripheral Pulses, No clubbing, cyanosis, or edema, onychomycosis in all fingernails, swollen and pink coloration of peripheral digits of the hand      Labs                        12.2   7.1   )-----------( 145      ( 06 Nov 2018 04:40 )             37.5     11-06    141  |  104  |  17.0  ----------------------------<  207<H>  3.6   |  25.0  |  0.86    Ca    9.4      06 Nov 2018 04:40  Phos  3.0     11-06  Mg     1.9     11-06    TPro  7.2  /  Alb  3.8  /  TBili  0.2<L>  /  DBili  x   /  AST  16  /  ALT  10  /  AlkPhos  104  11-05    Troponin T, Serum (11.06.18 @ 04:40)    Troponin T, Serum: <0.01 ng/mL    MEDICATIONS  (STANDING):  amLODIPine   Tablet 10 milliGRAM(s) Oral daily  aspirin  chewable 81 milliGRAM(s) Oral daily  atorvastatin 40 milliGRAM(s) Oral at bedtime  carvedilol 12.5 milliGRAM(s) Oral every 12 hours  clopidogrel Tablet 75 milliGRAM(s) Oral daily  dextrose 5%. 1000 milliLiter(s) (50 mL/Hr) IV Continuous <Continuous>  dextrose 50% Injectable 12.5 Gram(s) IV Push once  dextrose 50% Injectable 25 Gram(s) IV Push once  dextrose 50% Injectable 25 Gram(s) IV Push once  furosemide    Tablet 40 milliGRAM(s) Oral two times a day  gabapentin 100 milliGRAM(s) Oral two times a day  heparin  Injectable 5000 Unit(s) SubCutaneous every 8 hours  insulin glargine Injectable (LANTUS) 15 Unit(s) SubCutaneous at bedtime  insulin lispro (HumaLOG) corrective regimen sliding scale   SubCutaneous three times a day before meals  insulin lispro Injectable (HumaLOG) 2 Unit(s) SubCutaneous three times a day before meals  levothyroxine 137 MICROGram(s) Oral daily  losartan 50 milliGRAM(s) Oral daily  pantoprazole  Injectable 40 milliGRAM(s) IV Push daily    MEDICATIONS  (PRN):  dextrose 40% Gel 15 Gram(s) Oral once PRN Blood Glucose LESS THAN 70 milliGRAM(s)/deciliter  glucagon  Injectable 1 milliGRAM(s) IntraMuscular once PRN Glucose LESS THAN 70 milligrams/deciliter  hydrALAZINE Injectable 10 milliGRAM(s) IV Push every 6 hours PRN SBP above 175

## 2018-11-06 NOTE — CONSULT NOTE ADULT - ASSESSMENT
Assessment/Plan: 69 y/o F patient is follow by Dr Breen for h/o CAD, S/P CABG( LIMA to the D1 and LAD, SVG to the RI, sequential SVG to the OM and PDA), DM, HTN, HLD, stage 2 CKD, hypothyroid, and obesity c/o nonexertional,  nonradiating chest pain. Presents to the ED with 8-10/10 left chest pain described as sharp to dull that increases with arm movement, breathing and palpitation, for the past 20 hours.       1) Hypertensive Emergency: SBP improved c/w current treatment and F/U TTE    2) MENEZES possible CHF: TTE pending    3) CAD s/p CABG: Atypical chest pain with new multifocal PVC's no new ischemic changes on EKG, Negative stress test 5/24/18, Continue ASA and Plavix, NPO post midnight    4) New onset Dysarthria: per PCT    5) Diabetes Mellitus: Per CPTC    6) Hypothyroidism: TSH pending

## 2018-11-06 NOTE — CONSULT NOTE ADULT - ATTENDING COMMENTS
Patient seen and examined by me.  I have discussed my recommendation with the PA which are outlined above.  Patient is followed by Dr [facility]. Patient is having intermittent chest pain on and off, he had suggested to her to have cath on her recent visit.  Patient is for Cath today.  Will follow.

## 2018-11-06 NOTE — PROGRESS NOTE ADULT - ASSESSMENT
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  Activity: ambulate with assistance  Diet: NPO    Hypertensive Emergency  Elevated SBP of 223  R/o end organ damage with CP, HA and new onset dysarthria  EKG: NSR w/ minor ST elevation in lead III, not seen on repeat EKG  Trop neg x1, will trend  s/p Norvasc 5mg PO, Coreg 25mg PO, Hydralazine 10mg IV x2  Continue home meds, Lasix 40mg BID, Coreg at half dose (12.5mg BID), Norvasc 10mg QD  Hydralazine and HCTZ held  Cardio consult pending, [facility]    New onset Dysarthria  Started >5 hours ago  Prior hx of CVA  CT Head: neg for acute ischemic or hemorrhagic infarct,   No other focal neurological deficits  Outside of window for tPA  MRI Head pending  Neuro consult pending, [facility]    MENEZES possible CHF  Gradually worsening, exercise tolerance down to 1/2 block  LE edema  Stress test from 05/2018: Post stress LVEF 76%  TTE pending    Diabetes Mellitus  Continue home meds, Lantus reduced to 15U, Premeal insulin 4U  Accuchecks premeal + QHS  Moderate insulin SS  HBA1c pending  B/l peripheral diabetic neuropathy, continue Gabapentin 100mg BID    Hiatal Hernia  Moderate in size as seen on CT Abd  Pain with ingestion of food  Continue Protonix 40mg QD  Surgery consult?    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  Activity: ambulate with assistance  Diet: NPO    Hypertensive Emergency  Elevated SBP of 223  R/o end organ damage with CP, HA and new onset dysarthria  EKG: NSR w/ minor ST elevation in lead III, not seen on repeat EKG  Trop neg x2  Reduce MAP slowly, 25% in first 4 hours then gradually thereafter  s/p Norvasc 5mg PO, Coreg 25mg PO, Hydralazine 10mg IV x2  Continue home meds, Lasix 40mg BID, Coreg at half dose (12.5mg BID), Norvasc 10mg QD  Hydralazine and HCTZ held  Cardio consult pending, [facility]    New onset Dysarthria  Started >5 hours ago  Prior hx of CVA  CT Head: neg for acute ischemic or hemorrhagic infarct,   No other focal neurological deficits  Outside of window for tPA  MRI Head pending  Neuro consult pending, [facility]    MENEZES possible CHF  Gradually worsening, exercise tolerance down to 1/2 block  LE edema  Stress test from 05/2018: Post stress LVEF 76%  TTE pending    Diabetes Mellitus  Continue home meds, Lantus reduced to 15U, Premeal insulin 4U  Accuchecks premeal + QHS  Moderate insulin SS  HBA1c pending  B/l peripheral diabetic neuropathy, continue Gabapentin 100mg BID    Hiatal Hernia  Moderate in size as seen on CT Abd  Pain with ingestion of food  Continue Protonix 40mg QD  Surgery consult?    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  Activity: ambulate with assistance  Diet: NPO    Hypertensive Emergency  Elevated SBP of 223  R/o end organ damage with CP, HA and new onset dysarthria  EKG: NSR w/ minor ST elevation in lead III, not seen on repeat EKG  Trop neg x2  Reduce MAP slowly, 25% in first 4 hours then gradually thereafter  s/p Norvasc 5mg PO, Coreg 25mg PO, Hydralazine 10mg IV x2  Continue home meds, Lasix 40mg BID, Coreg at half dose (12.5mg BID), Norvasc 10mg QD  Hydralazine and HCTZ held  Cardio consult pending, [facility]    New onset Dysarthria  Started >5 hours ago  Prior hx of CVA  CT Head: neg for acute ischemic or hemorrhagic infarct,   No other focal neurological deficits  Outside of window for tPA  MRI Head pending  Neuro consult pending, [facility]    MENEZES possible CHF  Gradually worsening, exercise tolerance down to 1/2 block  LE edema  Stress test from 05/2018: Post stress LVEF 76%  TTE pending    Diabetes Mellitus  Continue home meds, Lantus reduced to 15U, Premeal insulin 2U  Accuchecks premeal + QHS  Moderate insulin SS  HBA1c pending  B/l peripheral diabetic neuropathy, continue Gabapentin 100mg BID    Hiatal Hernia  Moderate in size as seen on CT Abd  Pain with ingestion of food  Continue Protonix 40mg QD  Surgery consult?    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  Activity: ambulate with assistance  Diet: NPO    Hypertensive Emergency  Elevated SBP of 223  R/o end organ damage with CP, HA and new onset dysarthria  EKG: NSR w/ minor ST elevation in lead III, not seen on repeat EKG  Trop neg x2  Reduce MAP slowly, 25% in first 4 hours then gradually thereafter  s/p Norvasc 5mg PO, Coreg 25mg PO, Hydralazine 10mg IV x2  Continue home meds, Lasix 40mg BID, Coreg at half dose (12.5mg BID), Norvasc 10mg QD  Hydralazine and HCTZ held  Cardio consult, [facility], Apprec recs, NPO for possible procedure today    New onset Dysarthria  Started >5 hours ago  Prior hx of CVA  CT Head: neg for acute ischemic or hemorrhagic infarct,   No other focal neurological deficits  Outside of window for tPA  MRI Head pending  Neuro consult pending, [facility]    MENEZES possible CHF  Gradually worsening, exercise tolerance down to 1/2 block  LE edema  Stress test from 05/2018: Post stress LVEF 76%  TTE pending    Diabetes Mellitus  Continue home meds, Lantus reduced to 15U, Premeal insulin 2U  Accuchecks premeal + QHS  Moderate insulin SS  HBA1c: 8.9%  B/l peripheral diabetic neuropathy, continue Gabapentin 100mg BID    Hiatal Hernia  Moderate in size as seen on CT Abd  Pain with ingestion of food  Continue Protonix 40mg QD  Surgery consult?    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    Hypertensive Emergency- improved  Elevated SBP of 223  R/o end organ damage with CP, HA and new onset dysarthria  EKG: NSR w/ minor ST elevation in lead III, not seen on repeat EKG  Trop neg x2  Reduce MAP slowly, 25% in first 4 hours then gradually thereafter  s/p Norvasc 5mg PO, Coreg 25mg PO, Hydralazine 10mg IV x2  Continue home meds, Lasix 40mg BID, Coreg at half dose (12.5mg BID), Norvasc 10mg QD  Hydralazine and HCTZ held  Cardio consult, [facility] - s/p cath with clean coronaries, d/c NPO, consider increasing ppi to bid and getting surgery involved for hernia intervention (likely to happen as OP)     New onset Dysarthria  Started >5 hours ago  Prior hx of CVA  CT Head: neg for acute ischemic or hemorrhagic infarct,   Outside of window for tPA  MRI Head pending; echo pending   Neuro consult pending, [facility]    MENEZES possible CHF  Gradually worsening, exercise tolerance down to 1/2 block  LE edema  Stress test from 05/2018: Post stress LVEF 76%  TTE pending    Diabetes Mellitus  Continue home meds, Lantus reduced to 15U, Premeal insulin 2U  Accuchecks premeal + QHS  Moderate insulin SS  HBA1c: 8.9%  B/l peripheral diabetic neuropathy, continue Gabapentin 100mg BID    Hiatal Hernia  Moderate in size as seen on CT Abd  Pain with ingestion of food  Continue Protonix 40mg QD  Surgery consult?    CAD s/p CABG  Continue ASA and Plavix    Hypothyroidism  Continue home med, Synthroid 137mcg QD  TSH noted - will d/w patient regarding when synthroid was last adjusted     Preventative Measure  Heparin 5000U Q8h

## 2018-11-06 NOTE — CONSULT NOTE ADULT - SUBJECTIVE AND OBJECTIVE BOX
History of Present Illness:  HPI:  67 yo female with PMH of HTN, CAD, CABG (2013), HLD, Hypothyroidism, DMII on insulin, CVA (, ) w/ residual R lower facial droop, Breast Cancer s/p mastectomy , glaucoma and blindness in Left eye presents with CP which began yesterday morning. She states the pain was 8/10, center of her chest, dull and stabby, moved to her shoulder and neck, worsened with movement, no alleviating factors, unsure if it is similar to her prior CAD pain. She stated at the time her heart was also beating very fast and she was sweating profusely for about 3 mins. She admitted to nausea but did not vomit. She took a 325mg Aspirin which helped her pain and then went to [facility]. Since then she has been having this chest pain and now decided to come to the hospital, brought by her daughter. Patient states she walks 1/2 a block and gets SOB, gradually worsening over the last few months, but denies orthopnea and PND. She does state her ankles get swollen at the end of the day. She states she had a HA when she came in but now it has resolved. Both her and her daughter noticed her speech was labored today. She denies any weakness or numbness anywhere else. She also has chronic neuropathy in b/l feet, worse on R.  She also states she began feeling burning on urination today.  Patient also complains of having pain w/ each meal, has to eat small bites at a time. States she has lots of GERD, but denies any trouble swallowing. She had an endoscopy and colonoscopy last year and her GI was planning on repeating them this year to further evaluate the odynophagia but was pending cardiac clearance. (2018 23:57)      Current Subjective Assessment: "I feel ok right now" Patient lying on stretcher in NAD in cath lab. Cath revealed patent grafts and large hiatal hernia. CT Chest reveals moderate hiatal hernia. Patient reports feeling like the food sits in her chest and even water gives her heartburn. She reports some shortness of breath and occasional sweats. She also reports a 25 lb weight gain in past year even though she feels she only eats small amounts.    Past Medical History  Breast cancer  Glaucoma  CVA (cerebral vascular accident)  Diabetes mellitus  Hypothyroid  Hypercholesterolemia  Coronary artery disease  HTN (hypertension)      Past Surgical History  S/P tonsillectomy  S/P appendectomy  S/P cardiac cath: with stents  S/P CABG x 4  S/P breast reconstruction, bilateral  S/P mastectomy      MEDICATIONS  (STANDING):  amLODIPine   Tablet 10 milliGRAM(s) Oral daily  aspirin  chewable 81 milliGRAM(s) Oral daily  atorvastatin 40 milliGRAM(s) Oral at bedtime  carvedilol 12.5 milliGRAM(s) Oral every 12 hours  dextrose 5%. 1000 milliLiter(s) (50 mL/Hr) IV Continuous <Continuous>  dextrose 50% Injectable 12.5 Gram(s) IV Push once  dextrose 50% Injectable 25 Gram(s) IV Push once  dextrose 50% Injectable 25 Gram(s) IV Push once  furosemide    Tablet 40 milliGRAM(s) Oral two times a day  gabapentin 100 milliGRAM(s) Oral two times a day  heparin  Injectable 5000 Unit(s) SubCutaneous every 8 hours  insulin glargine Injectable (LANTUS) 15 Unit(s) SubCutaneous at bedtime  insulin lispro (HumaLOG) corrective regimen sliding scale   SubCutaneous three times a day before meals  insulin lispro Injectable (HumaLOG) 2 Unit(s) SubCutaneous three times a day before meals  levothyroxine 137 MICROGram(s) Oral daily  losartan 50 milliGRAM(s) Oral daily  pantoprazole    Tablet 40 milliGRAM(s) Oral two times a day    MEDICATIONS  (PRN):  acetaminophen   Tablet .. 650 milliGRAM(s) Oral every 6 hours PRN Mild Pain (1 - 3)  dextrose 40% Gel 15 Gram(s) Oral once PRN Blood Glucose LESS THAN 70 milliGRAM(s)/deciliter  glucagon  Injectable 1 milliGRAM(s) IntraMuscular once PRN Glucose LESS THAN 70 milligrams/deciliter  hydrALAZINE Injectable 10 milliGRAM(s) IV Push every 6 hours PRN SBP above 175      Allergies: No Known Allergies      SOCIAL HISTORY:  Smoker: [ ] Yes  [x ] No        PACK YEARS:                         WHEN QUIT? 35 years ago  ETOH use: [ ] Yes  [x ] No              FREQUENCY / QUANTITY:  Illicit Drug use:  [ ] Yes  [ x] No  Occupation: retired   Live with:  and daughter  Assist device use: none    PMD: Mazid  Outside GI doctor: Tammy  Referring Cardiologist: Castro    Relevant Family History  FAMILY HISTORY:  No pertinent family history in first degree relatives      Review of Systems  GENERAL:  Fevers[] chills[] sweats[x] fatigue[] weight loss[] weight gain [x 25 lbs/a year]                                      [ ] NEGATIVE  NEURO:  parathesias[] seizures []  syncope []  confusion []                                                                [ x] NEGATIVE                 EYES: glasses[x]  blurry vision[]  discharge[] pain[] glaucoma []                                                           [ ] NEGATIVE                 ENMT:  difficulty hearing []  vertigo[]  dysphagia[] epistaxis[] recent dental work []                     [x ] NEGATIVE                 CV:  chest pain[x] palpitations[] MENEZES [x] diaphoresis [] edema[]                                                           [x ] NEGATIVE                                 RESPIRATORY:  wheezing[] SOB[x] cough [] sputum[] hemoptysis[]                                                   [ ] NEGATIVE               GI:  nausea[x]  vomiting []  diarrhea[] constipation [] melena []                                                         [ ] NEGATIVE            : hematuria[ ]  dysuria[ ] urgency[] incontinence[]                                                                          [x ] NEGATIVE                    MUSKULOSKELETAL:  arthritis[ ]  joint swelling [ ] muscle weakness [ ]                                            [x ] NEGATIVE                     SKIN/BREAST:  rash[ ] itching [ ]  hair loss[ ] masses[ ]                                                                          [x ] NEGATIVE                     PSYCH:  dementia [ ] depression [ ] anxiety[ ]                                                                                          [x ] NEGATIVE                        HEME/LYMPH:  bruises easily[ ] enlarged lymph nodes[ ] tender lymph nodes[ ]                           [x ] NEGATIVE                      ENDOCRINE:  cold intolerance[ ] heat intolerance[ ] polydipsia[ ]                                                      [x ] NEGATIVE                          PHYSICAL EXAM  Vital Signs Last 24 Hrs  T(C): 36.7 (2018 15:01), Max: 37 (2018 19:17)  T(F): 98.1 (2018 15:01), Max: 98.6 (2018 19:17)  HR: 62 (2018 18:57) (55 - 75)  BP: 167/84 (2018 18:57) (137/78 - 223/99)  BP(mean): --  RR: 16 (2018 18:57) (15 - 21)  SpO2: 97% (2018 18:57) (95% - 100%)    General: Well nourished, well developed, no acute distress.                                                         Neuro: Normal exam oriented to person/place & time with no focal motor or sensory  deficits.                    Eyes: Normal exam of conjunctiva & lids, pupils equally reactive.   ENT: Normal exam of nasal/oral mucosa with absence of cyanosis.   Neck: Normal exam of jugular veins, trachea & thyroid.   Chest: Normal lung exam with good air movement absence of wheezes, rales, or rhonchi:                                                                          CV:  Auscultation: normal [ ] S3[ ] S4[ ] Irregular [ ] Rub[ ] Clicks[ ]  Murmurs none:[ ]systolic [ ]  diastolic [ ] holosystolic [ ]  Carotids: No Bruits[ ] Other____________ Abdominal Aorta: normal [ ] nonpalpable[ ]                                                                         GI: Normal exam of abdomen, liver & spleen with no noted masses or tenderness.                                                                                              Extremities: Normal no evidence of cyanosis or deformity Edema: none[ ]trace[ ]1+[ ]2+[ ]3+[ ]4+[ ]  Lower Extremity Pulses: Right[ ] Left[ ]Varicosities[ ]  SKIN : Normal exam to inspection & palpation.                                                           LABS:                        12.2   7.1   )-----------( 145      ( 2018 04:40 )             37.5     11-    141  |  104  |  17.0  ----------------------------<  207<H>  3.6   |  25.0  |  0.86    Ca    9.4      2018 04:40  Phos  3.0     11-  Mg     1.9     -    TPro  7.2  /  Alb  3.8  /  TBili  0.2<L>  /  DBili  x   /  AST  16  /  ALT  10  /  AlkPhos  104  11-05    PT/INR - ( 2018 21:41 )   PT: 11.4 sec;   INR: 0.99 ratio         PTT - ( 2018 21:41 )  PTT:36.0 sec  Urinalysis Basic - ( 2018 22:37 )    Color: Yellow / Appearance: Clear / S.015 / pH: x  Gluc: x / Ketone: Negative  / Bili: Negative / Urobili: Negative mg/dL   Blood: x / Protein: 30 mg/dL / Nitrite: Negative   Leuk Esterase: Negative / RBC: x / WBC x   Sq Epi: x / Non Sq Epi: x / Bacteria: x      CARDIAC MARKERS ( 2018 11:00 )  x     / <0.01 ng/mL / 94 U/L / x     / x      CARDIAC MARKERS ( 2018 04:40 )  x     / <0.01 ng/mL / 96 U/L / x     / x      CARDIAC MARKERS ( 2018 21:41 )  x     / <0.01 ng/mL / x     / x     / x

## 2018-11-06 NOTE — PROGRESS NOTE ADULT - SUBJECTIVE AND OBJECTIVE BOX
69 yo female with PMH of HTN, CAD, CABG (2013), HLD, Hypothyroidism, DMII on insulin, CVA (2004, 2013) w/ residual R lower facial droop, Breast Cancer s/p mastectomy 1984, glaucoma and blindness in Left eye presents with CP which began yesterday morning. She states the pain was 8/10, center of her chest, dull and stabby, moved to her shoulder and neck, worsened with movement, no alleviating factors, unsure if it is similar to her prior CAD pain. She stated at the time her heart was also beating very fast and she was sweating profusely for about 3 mins. She admitted to nausea but did not vomit. She took a 325mg Aspirin which helped her pain and then went to [facility]. Since then she has been having this chest pain and now decided to come to the hospital, brought by her daughter. Patient states she walks 1/2 a block and gets SOB, gradually worsening over the last few months, but denies orthopnea and PND. She does state her ankles get swollen at the end of the day. She states she had a HA when she came in but now it has resolved. Both her and her daughter noticed her speech was labored today. She denies any weakness or numbness anywhere else. She also has chronic neuropathy in b/l feet, worse on R.  She also states she began feeling burning on urination today.  Patient also complains of having pain w/ each meal, has to eat small bites at a time. States she has lots of GERD, but denies any trouble swallowing. She had an endoscopy and colonoscopy last year and her GI was planning on repeating them this year to further evaluate the odynophagia but was pending cardiac clearance. (05 Nov 2018 23:57)    ASA 2  Mallampati 2  Unstable angina for cardiac cath to r/o further CAD.

## 2018-11-06 NOTE — CONSULT NOTE ADULT - ASSESSMENT
The patient is a 68y Female with transient slurred speech in the setting of chest pain.     Possible TIA.   Agree with MRI brain.   Continue antiplatelet and statin.     Chest pain  Plan per medicine.     Diabetic peripheral neuropathy   Continue Gabapentin.    Case discussed with Dr Mccall

## 2018-11-06 NOTE — OCCUPATIONAL THERAPY INITIAL EVALUATION ADULT - PERTINENT HX OF CURRENT PROBLEM, REHAB EVAL
Head CT with no CT evidence of acute intracranial hemorrhage, mass effect or acute territorial infarct; small patchy age indeterminate infarction left corona radiata and left thalamus; encephalomalacic cavity involving the left lentiform nucleus and adjacent white matter may be sequela of prior hemorrhage or infarct. Chest CTA with no aortic dissection.

## 2018-11-06 NOTE — OCCUPATIONAL THERAPY INITIAL EVALUATION ADULT - SENSORY TESTS
pt reports right hand "tingling and numbness" since prior stroke; pt denies any new changes with sensation

## 2018-11-06 NOTE — CONSULT NOTE ADULT - ASSESSMENT
68F presented with chest pain found to have a moderate to large hiatal hernia.    Will d/w Dr Cantor  At this time recommending outpatient followup on 11/15 in his office. 68F presented with chest pain found to have a moderate to large hiatal hernia.    Will d/w Dr Perez  At this time recommending outpatient followup on 11/15 in his office.

## 2018-11-06 NOTE — CONSULT NOTE ADULT - SUBJECTIVE AND OBJECTIVE BOX
Consult requested by:  Dr Armando    Reason for Consultation: Chest pain     History obtained by: Patient and medical record     obtained: No    Chief complaint:  Chest pain    HPI:  69 yo female with PMH of HTN, CAD, CABG (), HLD, Hypothyroidism, DMII on insulin, CVA (, ) w/ residual R lower facial droop, Breast Cancer s/p mastectomy , glaucoma and blindness in Left eye presents with CP which began yesterday morning. She states the pain was 8/10, center of her chest, dull and stabby, moved to her shoulder and neck, worsened with movement, no alleviating factors, unsure if it is similar to her prior CAD pain. She stated at the time her heart was also beating very fast and she was sweating profusely for about 3 mins. She admitted to nausea but did not vomit. She took a 325mg Aspirin which helped her pain and then went to [facility]. Since then she has been having this chest pain and now decided to come to the hospital, brought by her daughter. Patient states she walks 1/2 a block and gets SOB, gradually worsening over the last few months, but denies orthopnea and PND. She does state her ankles get swollen at the end of the day. She states she had a HA when she came in but now it has resolved. Both her and her daughter noticed her speech was labored today. She denies any weakness or numbness anywhere else. She also has chronic neuropathy in b/l feet, worse on R.  She also states she began feeling burning on urination today.  Patient also complains of having pain w/ each meal, has to eat small bites at a time. States she has lots of GERD, but denies any trouble swallowing. She had an endoscopy and colonoscopy last year and her GI was planning on repeating them this year to further evaluate the odynophagia but was pending cardiac clearance. (2018 23:57)    Above HPI noted this patient is follow by Dr Santacruz for h/o CAD, S/P CABG( LIMA to the D1 and LAD, SVG to the RI, sequential SVG to the OM and PDA), DM, HTN, HLD, stage 2 CKD, hypothyroid, and obesity c/o nonexertional,  nonradiating chest pain. I saw this patient in the ED she admits to 8-10/10 left chest pain described as sharp to dull that increases with arm movement, breathing and palpitation, She admit to nausea but no vomiting, heavy sweats yesterday and positive sick contact grandkids, recent weakness and fatigue. She reports Dr sanatcruz has spoken to her about cardiac cath at some point. Admit to recent weight gain     REVIEW OF SYSTEMS:  CONSTITUTIONAL: No fever, + weight gain and fatigue  EYES: No eye pain, visual disturbances, or discharge  ENMT:  No difficulty hearing, tinnitus, vertigo; No sinus or throat pain  NECK: No pain or stiffness  RESPIRATORY: No cough, wheezing, chills or hemoptysis; No shortness of breath  CARDIOVASCULAR: + chest pain, No palpitations, dizziness, or leg swelling  GASTROINTESTINAL: No abdominal or epigastric pain. + nausea,  No vomiting, or hematemesis; No diarrhea or constipation. No melena or hematochezia.  GENITOURINARY: No dysuria, frequency, hematuria, or incontinence  NEUROLOGICAL: No headaches, memory loss, loss of strength, numbness, or tremors  SKIN: No itching, burning, rashes, or lesions   LYMPH NODES: No enlarged glands  ENDOCRINE: No heat or cold intolerance; No hair loss  MUSCULOSKELETAL: No joint pain or swelling; No muscle, back, or extremity pain  PSYCHIATRIC: No depression, anxiety, mood swings, or difficulty sleeping  HEME/LYMPH: No easy bruising, or bleeding gums  ALLERY AND IMMUNOLOGIC: No hives or eczema    MEDICATIONS  (STANDING):  amLODIPine   Tablet 10 milliGRAM(s) Oral daily  aspirin  chewable 81 milliGRAM(s) Oral daily  atorvastatin 40 milliGRAM(s) Oral at bedtime  carvedilol 12.5 milliGRAM(s) Oral every 12 hours  clopidogrel Tablet 75 milliGRAM(s) Oral daily  dextrose 5%. 1000 milliLiter(s) (50 mL/Hr) IV Continuous <Continuous>  dextrose 50% Injectable 12.5 Gram(s) IV Push once  dextrose 50% Injectable 25 Gram(s) IV Push once  dextrose 50% Injectable 25 Gram(s) IV Push once  furosemide    Tablet 40 milliGRAM(s) Oral two times a day  gabapentin 100 milliGRAM(s) Oral two times a day  heparin  Injectable 5000 Unit(s) SubCutaneous every 8 hours  insulin glargine Injectable (LANTUS) 15 Unit(s) SubCutaneous at bedtime  insulin lispro (HumaLOG) corrective regimen sliding scale   SubCutaneous three times a day before meals  insulin lispro Injectable (HumaLOG) 2 Unit(s) SubCutaneous three times a day before meals  levothyroxine 137 MICROGram(s) Oral daily  losartan 50 milliGRAM(s) Oral daily  pantoprazole  Injectable 40 milliGRAM(s) IV Push daily    MEDICATIONS  (PRN):  dextrose 40% Gel 15 Gram(s) Oral once PRN Blood Glucose LESS THAN 70 milliGRAM(s)/deciliter  glucagon  Injectable 1 milliGRAM(s) IntraMuscular once PRN Glucose LESS THAN 70 milligrams/deciliter  hydrALAZINE Injectable 10 milliGRAM(s) IV Push every 6 hours PRN SBP above 175        PAST MEDICAL & SURGICAL HISTORY:  Breast cancer  Glaucoma  CVA (cerebral vascular accident)  Diabetes mellitus  Hypothyroid  Hypercholesterolemia  Coronary artery disease  HTN (hypertension)  S/P tonsillectomy  S/P appendectomy  S/P cardiac cath: with stents  S/P CABG x 4  S/P breast reconstruction, bilateral  S/P mastectomy      FAMILY HISTORY:  No pertinent family history in first degree relatives      SOCIAL HISTORY:  CIGARETTES:  Ex smoker quit 28y/o   ALCOHOL: no  DRUGS: no    Vital Signs Last 24 Hrs  T(C): 36.7 (2018 02:01), Max: 37 (2018 19:17)  T(F): 98 (2018 02:01), Max: 98.6 (2018 19:17)  HR: 55 (2018 02:01) (55 - 75)  BP: 152/77 (2018 02:01) (152/77 - 223/99)  BP(mean): --  RR: 18 (2018 02:) (18 - 20)  SpO2: 97% (2018 02:) (96% - 100%)    PHYSICAL EXAM:  GENERAL: NAD, well-groomed, well-developed  HEAD:  Atraumatic, Normocephalic  EYES: EOMI, PERRLA, conjunctiva and sclera clear  ENMT: No tonsillar erythema, exudates, or enlargement; Moist mucous membranes, Good dentition, No lesions  NECK: Supple, No JVD, Normal thyroid  NERVOUS SYSTEM:  Alert & Oriented X3, Good concentration; Motor Strength 5/5 B/L upper and lower extremities  CHEST/LUNG: Clear to percussion bilaterally; No rales, rhonchi, wheezing, or rubs, surgical scar on chest wall  HEART: Regular rate and rhythm; No murmurs, rubs, or gallops  ABDOMEN: Soft, Nontender, Nondistended; Bowel sounds present  EXTREMITIES:  2+ Peripheral Pulses, No clubbing, cyanosis, or edema  LYMPH: No lymphadenopathy noted  SKIN: No rashes or lesions        INTERPRETATION OF TELEMETRY: sinus bradycardia rate 56    ECG: NSR rate 66 multifocal PVC's x 2, no acute ST-T wave changes     I&O's Detail      LABS:                      12.2   7.0   )-----------( 140      ( 2018 21:41 )             37.0     11-05    143  |  105  |  21.0<H>  ----------------------------<  206<H>  3.6   |  25.0  |  0.89    Ca    9.5      2018 21:41  Mg     2.0     11-05    TPro  7.2  /  Alb  3.8  /  TBili  0.2<L>  /  DBili  x   /  AST  16  /  ALT  10  /  AlkPhos  104  11-05    CARDIAC MARKERS ( 2018 21:41 )  x     / <0.01 ng/mL / x     / x     / x          PT/INR - ( 2018 21:41 )   PT: 11.4 sec;   INR: 0.99 ratio         PTT - ( 2018 21:41 )  PTT:36.0 sec  Urinalysis Basic - ( 2018 22:37 )    Color: Yellow / Appearance: Clear / S.015 / pH: x  Gluc: x / Ketone: Negative  / Bili: Negative / Urobili: Negative mg/dL   Blood: x / Protein: 30 mg/dL / Nitrite: Negative   Leuk Esterase: Negative / RBC: x / WBC x   Sq Epi: x / Non Sq Epi: x / Bacteria: x      I&O's Summary      RADIOLOGY & ADDITIONAL STUDIES:  X-ray:    FINDINGS:  CHEST:   LUNGS AND LARGE AIRWAYS: Patent central airways.Minimal bibasilar   dependent atelectasis.  PLEURA: No pleural effusion.  VESSELS: Normal caliber thoracic aorta. No evidence for intramural   hematoma. No aortic dissection. Great vessels are within normal limits..  HEART: Heart size is normal. Status post CABG. No pericardial effusion.  MEDIASTINUM AND FERNANDA: No lymphadenopathy.. Moderate hiatal hernia.  CHEST WALL AND LOWER NECK: Heterogeneous thyroid gland. Median sternotomy.  WILLAM HINTON M.D., ATTENDING RADIOLOGIST  This document has been electronically signed. 2018 12:55AM          PREVIOUS DIAGNOSTIC TESTING:        ECHO: out patient     STRESS :  Conclusion:  The left ventricle was normal LV size. There are medium  sized, moderate defects in basal and midinferior and basal  inferolateral walls that are predominantly fixed,  suggestive of infarct.There are small, mild defects in mid  anterior and mid anteroseptal walls that are predominantly  fixed, suggestive of no clear evidence of infarction  consistent with attenuation artifact.Review of raw data  shows: Minor motion artifact., Diaphragmatic artifact.,  Breast attenuation artifact.  The left ventricle was normal LV size. There are medium  sized, moderate defects in basal and midinferior and basal  inferolateral walls that are predominantly fixed,  suggestive of infarct.There are small, mild defects in mid  anterior and mid anteroseptal walls that are predominantly  fixed, suggestive of no clear evidence of infarction  consistent with attenuation artifact.  ------------------------------------------------------------------------  ------------------------------------------------------------------------    Confirmed on  2018 - 10:48:06 by Deivn Limon MD   Cardiology Fellow: Ede ANTUNEZP-C  ------------------------------------------------------------------------ Consult requested by:  Dr Armando    Reason for Consultation: Chest pain / HTN    History obtained by: Patient and medical record     obtained: No    Chief complaint:  Chest pain HTN    HPI:  67 yo female with PMH of HTN, CAD, CABG (), HLD, Hypothyroidism, DMII on insulin, CVA (, ) w/ residual R lower facial droop, Breast Cancer s/p mastectomy , glaucoma and blindness in Left eye presents with CP which began yesterday morning. She states the pain was 8/10, center of her chest, dull and stabby, moved to her shoulder and neck, worsened with movement, no alleviating factors, unsure if it is similar to her prior CAD pain. She stated at the time her heart was also beating very fast and she was sweating profusely for about 3 mins. She admitted to nausea but did not vomit. She took a 325mg Aspirin which helped her pain and then went to [facility]. Since then she has been having this chest pain and now decided to come to the hospital, brought by her daughter. Patient states she walks 1/2 a block and gets SOB, gradually worsening over the last few months, but denies orthopnea and PND. She does state her ankles get swollen at the end of the day. She states she had a HA when she came in but now it has resolved. Both her and her daughter noticed her speech was labored today. She denies any weakness or numbness anywhere else. She also has chronic neuropathy in b/l feet, worse on R.  She also states she began feeling burning on urination today.  Patient also complains of having pain w/ each meal, has to eat small bites at a time. States she has lots of GERD, but denies any trouble swallowing. She had an endoscopy and colonoscopy last year and her GI was planning on repeating them this year to further evaluate the odynophagia but was pending cardiac clearance. (2018 23:57)    Above HPI noted this patient is follow by Dr Santacruz for h/o CAD, S/P CABG( LIMA to the D1 and LAD, SVG to the RI, sequential SVG to the OM and PDA), DM, HTN, HLD, stage 2 CKD, hypothyroid, and obesity c/o nonexertional,  nonradiating chest pain. I saw this patient in the ED she admits to 8-10/10 left chest pain described as sharp to dull that increases with arm movement, breathing and palpation, She admits to nausea but no vomiting, heavy sweats yesterday and positive sick contact grandkids, recent weakness and fatigue. She reports Dr santacruz has spoken to her about cardiac cath at some point. Admit to recent weight gain. SBP now 158      REVIEW OF SYSTEMS:  CONSTITUTIONAL: No fever, + weight gain and fatigue  EYES: No eye pain, visual disturbances, or discharge  ENMT:  No difficulty hearing, tinnitus, vertigo; No sinus or throat pain  NECK: No pain or stiffness  RESPIRATORY: No cough, wheezing, chills or hemoptysis; No shortness of breath  CARDIOVASCULAR: + chest pain, No palpitations, dizziness, or leg swelling  GASTROINTESTINAL: No abdominal or epigastric pain. + nausea,  No vomiting, or hematemesis; No diarrhea or constipation. No melena or hematochezia.  GENITOURINARY: No dysuria, frequency, hematuria, or incontinence  NEUROLOGICAL: No headaches, memory loss, loss of strength, numbness, or tremors  SKIN: No itching, burning, rashes, or lesions   LYMPH NODES: No enlarged glands  ENDOCRINE: No heat or cold intolerance; No hair loss  MUSCULOSKELETAL: No joint pain or swelling; No muscle, back, or extremity pain  PSYCHIATRIC: No depression, anxiety, mood swings, or difficulty sleeping  HEME/LYMPH: No easy bruising, or bleeding gums  ALLERY AND IMMUNOLOGIC: No hives or eczema    MEDICATIONS  (STANDING):  amLODIPine   Tablet 10 milliGRAM(s) Oral daily  aspirin  chewable 81 milliGRAM(s) Oral daily  atorvastatin 40 milliGRAM(s) Oral at bedtime  carvedilol 12.5 milliGRAM(s) Oral every 12 hours  clopidogrel Tablet 75 milliGRAM(s) Oral daily  dextrose 5%. 1000 milliLiter(s) (50 mL/Hr) IV Continuous <Continuous>  dextrose 50% Injectable 12.5 Gram(s) IV Push once  dextrose 50% Injectable 25 Gram(s) IV Push once  dextrose 50% Injectable 25 Gram(s) IV Push once  furosemide    Tablet 40 milliGRAM(s) Oral two times a day  gabapentin 100 milliGRAM(s) Oral two times a day  heparin  Injectable 5000 Unit(s) SubCutaneous every 8 hours  insulin glargine Injectable (LANTUS) 15 Unit(s) SubCutaneous at bedtime  insulin lispro (HumaLOG) corrective regimen sliding scale   SubCutaneous three times a day before meals  insulin lispro Injectable (HumaLOG) 2 Unit(s) SubCutaneous three times a day before meals  levothyroxine 137 MICROGram(s) Oral daily  losartan 50 milliGRAM(s) Oral daily  pantoprazole  Injectable 40 milliGRAM(s) IV Push daily    MEDICATIONS  (PRN):  dextrose 40% Gel 15 Gram(s) Oral once PRN Blood Glucose LESS THAN 70 milliGRAM(s)/deciliter  glucagon  Injectable 1 milliGRAM(s) IntraMuscular once PRN Glucose LESS THAN 70 milligrams/deciliter  hydrALAZINE Injectable 10 milliGRAM(s) IV Push every 6 hours PRN SBP above 175        PAST MEDICAL & SURGICAL HISTORY:  Breast cancer  Glaucoma  CVA (cerebral vascular accident)  Diabetes mellitus  Hypothyroid  Hypercholesterolemia  Coronary artery disease  HTN (hypertension)  S/P tonsillectomy  S/P appendectomy  S/P cardiac cath: with stents  S/P CABG x 4  S/P breast reconstruction, bilateral  S/P mastectomy      FAMILY HISTORY:  No pertinent family history in first degree relatives      SOCIAL HISTORY:  CIGARETTES:  Ex smoker quit 28y/o   ALCOHOL: no  DRUGS: no    Vital Signs Last 24 Hrs  T(C): 36.7 (2018 02:01), Max: 37 (2018 19:17)  T(F): 98 (2018 02:01), Max: 98.6 (2018 19:17)  HR: 55 (2018 02:01) (55 - 75)  BP: 152/77 (2018 02:01) (152/77 - 223/99)  BP(mean): --  RR: 18 (2018 02:01) (18 - 20)  SpO2: 97% (2018 02:01) (96% - 100%)    PHYSICAL EXAM:  GENERAL: NAD, well-groomed, well-developed  HEAD:  Atraumatic, Normocephalic  EYES: EOMI, PERRLA, conjunctiva and sclera clear  ENMT: No tonsillar erythema, exudates, or enlargement; Moist mucous membranes, Good dentition, No lesions  NECK: Supple, No JVD, Normal thyroid  NERVOUS SYSTEM:  Alert & Oriented X3, Good concentration; Motor Strength 5/5 B/L upper and lower extremities  CHEST/LUNG: Clear to percussion bilaterally; No rales, rhonchi, wheezing, or rubs, surgical scar on chest wall  HEART: Regular rate and rhythm; No murmurs, rubs, or gallops  ABDOMEN: Soft, Nontender, Nondistended; Bowel sounds present  EXTREMITIES:  2+ Peripheral Pulses, No clubbing, cyanosis, or edema  LYMPH: No lymphadenopathy noted  SKIN: No rashes or lesions        INTERPRETATION OF TELEMETRY: sinus bradycardia rate 56    ECG: NSR rate 66 multifocal PVC's x 2, no acute ST-T wave changes     I&O's Detail      LABS:                      12.2   7.0   )-----------( 140      ( 2018 21:41 )             37.0     11-05    143  |  105  |  21.0<H>  ----------------------------<  206<H>  3.6   |  25.0  |  0.89    Ca    9.5      2018 21:41  Mg     2.0     11-05    TPro  7.2  /  Alb  3.8  /  TBili  0.2<L>  /  DBili  x   /  AST  16  /  ALT  10  /  AlkPhos  104  11-05    CARDIAC MARKERS ( 2018 21:41 )  x     / <0.01 ng/mL / x     / x     / x          PT/INR - ( 2018 21:41 )   PT: 11.4 sec;   INR: 0.99 ratio         PTT - ( 2018 21:41 )  PTT:36.0 sec  Urinalysis Basic - ( 2018 22:37 )    Color: Yellow / Appearance: Clear / S.015 / pH: x  Gluc: x / Ketone: Negative  / Bili: Negative / Urobili: Negative mg/dL   Blood: x / Protein: 30 mg/dL / Nitrite: Negative   Leuk Esterase: Negative / RBC: x / WBC x   Sq Epi: x / Non Sq Epi: x / Bacteria: x      I&O's Summary      RADIOLOGY & ADDITIONAL STUDIES:  X-ray:    FINDINGS:  CHEST:   LUNGS AND LARGE AIRWAYS: Patent central airways.Minimal bibasilar   dependent atelectasis.  PLEURA: No pleural effusion.  VESSELS: Normal caliber thoracic aorta. No evidence for intramural   hematoma. No aortic dissection. Great vessels are within normal limits..  HEART: Heart size is normal. Status post CABG. No pericardial effusion.  MEDIASTINUM AND FERNANDA: No lymphadenopathy.. Moderate hiatal hernia.  CHEST WALL AND LOWER NECK: Heterogeneous thyroid gland. Median sternotomy.  WILLAM HINTON M.D., ATTENDING RADIOLOGIST  This document has been electronically signed. 2018 12:55AM          PREVIOUS DIAGNOSTIC TESTING:        ECHO: out patient     STRESS :  Conclusion:  The left ventricle was normal LV size. There are medium  sized, moderate defects in basal and midinferior and basal  inferolateral walls that are predominantly fixed,  suggestive of infarct.There are small, mild defects in mid  anterior and mid anteroseptal walls that are predominantly  fixed, suggestive of no clear evidence of infarction  consistent with attenuation artifact.Review of raw data  shows: Minor motion artifact., Diaphragmatic artifact.,  Breast attenuation artifact.  The left ventricle was normal LV size. There are medium  sized, moderate defects in basal and midinferior and basal  inferolateral walls that are predominantly fixed,  suggestive of infarct.There are small, mild defects in mid  anterior and mid anteroseptal walls that are predominantly  fixed, suggestive of no clear evidence of infarction  consistent with attenuation artifact.  ------------------------------------------------------------------------  ------------------------------------------------------------------------    Confirmed on  2018 - 10:48:06 by Devin Limon MD   Cardiology Fellow: Ede ANTUNEZP-C  ------------------------------------------------------------------------

## 2018-11-06 NOTE — ED ADULT NURSE REASSESSMENT NOTE - NS ED NURSE REASSESS COMMENT FT1
Pt resting comfortably in bed, still reporting 6/10 chest pain but stated she is comfortable and does not want additional pain medications. Pt is sinus nathen on monitor HR 59 . Neurologically  intact. NAD at this time. Safety maintained, awaiting bed assignment.

## 2018-11-06 NOTE — PROGRESS NOTE ADULT - SUBJECTIVE AND OBJECTIVE BOX
Cardiology PA note    Called by nurse patient BP 80/50, HR 61. Patient is S/P LHC today without PCI. 500 cc Bolus NS given. /80 HR 69 stat CBC sent, Groin check is soft intact no hematoma.    D/W Dr Erazo requested stat Cat Scan Abdomin and pelvic r/o retro bleed. Cardiology PA note    Called by nurse patient BP 80/50, HR 61. Patient is S/P LHC today without PCI. 500 cc Bolus NS given. /80 HR 69 stat CBC sent, Groin check is soft intact no hematoma.    D/W Dr Erazo requested stat Cat Scan Abdomin and pelvic r/o retro bleed.        ABDOMEN:    Liver: Unremarkable.    Gallbladder and bile ducts:  Multiple tiny calcifications in dependent   portion   of gallbladder. No biliary dilatation..    Pancreas: No inflammation.    Spleen: Normal size.     Adrenals: No mass.    Kidneys and ureters:  Residual contrast from recent catheterization in   renal   collecting system and ureters. No hydronephrosis..    Stomach and bowel: No obstruction or inflammation.    Appendix: No appendicitis.    Retroperitoneal space:  No rectus sheath hematoma, retroperitoneal bleed   or   other internal hemorrhage.     PELVIS:    Bladder:  Excreted vascular contrast in urinary bladder. No significant   abnormalities.    Reproductive:  Unremarkable uterus and ovaries.     ABDOMEN and PELVIS:    Intraperitoneal space: No pneumoperitoneum or fluid collections.    Bones/joints: No acute fractures.    Soft tissues: Minimal right groin edema / tiny contusion. No inguinal,   femoral   or retroperitoneal hematoma. No internal bleeding.    Vasculature:  Atherosclerotic calcifications. No AAA.    Lymph nodes: No adenopathy.     IMPRESSION:   1. No inguinal, femoral or retroperitoneal hematoma.   2. Hiatal hernia. Cholelithiasis. Chest wall lipoma.    ******PRELIMINARY REPORT******    ******PRELIMINARY REPORT******          AAMIR STILES

## 2018-11-07 ENCOUNTER — TRANSCRIPTION ENCOUNTER (OUTPATIENT)
Age: 68
End: 2018-11-07

## 2018-11-07 DIAGNOSIS — I63.9 CEREBRAL INFARCTION, UNSPECIFIED: ICD-10-CM

## 2018-11-07 DIAGNOSIS — I25.10 ATHEROSCLEROTIC HEART DISEASE OF NATIVE CORONARY ARTERY WITHOUT ANGINA PECTORIS: ICD-10-CM

## 2018-11-07 DIAGNOSIS — I95.9 HYPOTENSION, UNSPECIFIED: ICD-10-CM

## 2018-11-07 DIAGNOSIS — K44.9 DIAPHRAGMATIC HERNIA WITHOUT OBSTRUCTION OR GANGRENE: ICD-10-CM

## 2018-11-07 LAB
ANION GAP SERPL CALC-SCNC: 16 MMOL/L — SIGNIFICANT CHANGE UP (ref 5–17)
BUN SERPL-MCNC: 27 MG/DL — HIGH (ref 8–20)
CALCIUM SERPL-MCNC: 9.2 MG/DL — SIGNIFICANT CHANGE UP (ref 8.6–10.2)
CHLORIDE SERPL-SCNC: 98 MMOL/L — SIGNIFICANT CHANGE UP (ref 98–107)
CO2 SERPL-SCNC: 23 MMOL/L — SIGNIFICANT CHANGE UP (ref 22–29)
CREAT SERPL-MCNC: 1.59 MG/DL — HIGH (ref 0.5–1.3)
GLUCOSE BLDC GLUCOMTR-MCNC: 121 MG/DL — HIGH (ref 70–99)
GLUCOSE BLDC GLUCOMTR-MCNC: 166 MG/DL — HIGH (ref 70–99)
GLUCOSE BLDC GLUCOMTR-MCNC: 267 MG/DL — HIGH (ref 70–99)
GLUCOSE BLDC GLUCOMTR-MCNC: 280 MG/DL — HIGH (ref 70–99)
GLUCOSE BLDC GLUCOMTR-MCNC: 285 MG/DL — HIGH (ref 70–99)
GLUCOSE BLDC GLUCOMTR-MCNC: 381 MG/DL — HIGH (ref 70–99)
GLUCOSE BLDC GLUCOMTR-MCNC: 454 MG/DL — CRITICAL HIGH (ref 70–99)
GLUCOSE SERPL-MCNC: 309 MG/DL — HIGH (ref 70–115)
HCT VFR BLD CALC: 38.5 % — SIGNIFICANT CHANGE UP (ref 37–47)
HGB BLD-MCNC: 12.3 G/DL — SIGNIFICANT CHANGE UP (ref 12–16)
MAGNESIUM SERPL-MCNC: 2 MG/DL — SIGNIFICANT CHANGE UP (ref 1.6–2.6)
MCHC RBC-ENTMCNC: 28 PG — SIGNIFICANT CHANGE UP (ref 27–31)
MCHC RBC-ENTMCNC: 31.9 G/DL — LOW (ref 32–36)
MCV RBC AUTO: 87.5 FL — SIGNIFICANT CHANGE UP (ref 81–99)
PHOSPHATE SERPL-MCNC: 4.3 MG/DL — SIGNIFICANT CHANGE UP (ref 2.4–4.7)
PLATELET # BLD AUTO: 149 K/UL — LOW (ref 150–400)
POTASSIUM SERPL-MCNC: 4.6 MMOL/L — SIGNIFICANT CHANGE UP (ref 3.5–5.3)
POTASSIUM SERPL-SCNC: 4.6 MMOL/L — SIGNIFICANT CHANGE UP (ref 3.5–5.3)
RBC # BLD: 4.4 M/UL — SIGNIFICANT CHANGE UP (ref 4.4–5.2)
RBC # FLD: 14.7 % — SIGNIFICANT CHANGE UP (ref 11–15.6)
SODIUM SERPL-SCNC: 137 MMOL/L — SIGNIFICANT CHANGE UP (ref 135–145)
WBC # BLD: 7.1 K/UL — SIGNIFICANT CHANGE UP (ref 4.8–10.8)
WBC # FLD AUTO: 7.1 K/UL — SIGNIFICANT CHANGE UP (ref 4.8–10.8)

## 2018-11-07 PROCEDURE — 99232 SBSQ HOSP IP/OBS MODERATE 35: CPT

## 2018-11-07 PROCEDURE — 93880 EXTRACRANIAL BILAT STUDY: CPT | Mod: 26

## 2018-11-07 PROCEDURE — 93306 TTE W/DOPPLER COMPLETE: CPT | Mod: 26

## 2018-11-07 PROCEDURE — 99233 SBSQ HOSP IP/OBS HIGH 50: CPT | Mod: GC

## 2018-11-07 RX ORDER — INSULIN LISPRO 100/ML
10 VIAL (ML) SUBCUTANEOUS ONCE
Qty: 0 | Refills: 0 | Status: COMPLETED | OUTPATIENT
Start: 2018-11-07 | End: 2018-11-07

## 2018-11-07 RX ORDER — INSULIN LISPRO 100/ML
3 VIAL (ML) SUBCUTANEOUS
Qty: 0 | Refills: 0 | Status: DISCONTINUED | OUTPATIENT
Start: 2018-11-07 | End: 2018-11-08

## 2018-11-07 RX ORDER — SODIUM CHLORIDE 9 MG/ML
1000 INJECTION, SOLUTION INTRAVENOUS ONCE
Qty: 0 | Refills: 0 | Status: COMPLETED | OUTPATIENT
Start: 2018-11-07 | End: 2018-11-07

## 2018-11-07 RX ORDER — INSULIN GLARGINE 100 [IU]/ML
20 INJECTION, SOLUTION SUBCUTANEOUS AT BEDTIME
Qty: 0 | Refills: 0 | Status: DISCONTINUED | OUTPATIENT
Start: 2018-11-07 | End: 2018-11-08

## 2018-11-07 RX ORDER — SODIUM CHLORIDE 9 MG/ML
1000 INJECTION INTRAMUSCULAR; INTRAVENOUS; SUBCUTANEOUS
Qty: 0 | Refills: 0 | Status: DISCONTINUED | OUTPATIENT
Start: 2018-11-07 | End: 2018-11-08

## 2018-11-07 RX ADMIN — HEPARIN SODIUM 5000 UNIT(S): 5000 INJECTION INTRAVENOUS; SUBCUTANEOUS at 15:02

## 2018-11-07 RX ADMIN — SODIUM CHLORIDE 2000 MILLILITER(S): 9 INJECTION, SOLUTION INTRAVENOUS at 13:34

## 2018-11-07 RX ADMIN — HEPARIN SODIUM 5000 UNIT(S): 5000 INJECTION INTRAVENOUS; SUBCUTANEOUS at 22:19

## 2018-11-07 RX ADMIN — Medication 600 MILLIGRAM(S): at 01:10

## 2018-11-07 RX ADMIN — Medication 3 UNIT(S): at 18:09

## 2018-11-07 RX ADMIN — INSULIN GLARGINE 15 UNIT(S): 100 INJECTION, SOLUTION SUBCUTANEOUS at 00:13

## 2018-11-07 RX ADMIN — Medication 81 MILLIGRAM(S): at 11:22

## 2018-11-07 RX ADMIN — GABAPENTIN 100 MILLIGRAM(S): 400 CAPSULE ORAL at 18:42

## 2018-11-07 RX ADMIN — HEPARIN SODIUM 5000 UNIT(S): 5000 INJECTION INTRAVENOUS; SUBCUTANEOUS at 00:11

## 2018-11-07 RX ADMIN — Medication 2 UNIT(S): at 09:04

## 2018-11-07 RX ADMIN — Medication 650 MILLIGRAM(S): at 18:30

## 2018-11-07 RX ADMIN — LOSARTAN POTASSIUM 50 MILLIGRAM(S): 100 TABLET, FILM COATED ORAL at 05:29

## 2018-11-07 RX ADMIN — Medication 40 MILLIGRAM(S): at 05:29

## 2018-11-07 RX ADMIN — SODIUM CHLORIDE 75 MILLILITER(S): 9 INJECTION INTRAMUSCULAR; INTRAVENOUS; SUBCUTANEOUS at 18:56

## 2018-11-07 RX ADMIN — Medication 12: at 12:36

## 2018-11-07 RX ADMIN — AMLODIPINE BESYLATE 10 MILLIGRAM(S): 2.5 TABLET ORAL at 05:29

## 2018-11-07 RX ADMIN — CARVEDILOL PHOSPHATE 12.5 MILLIGRAM(S): 80 CAPSULE, EXTENDED RELEASE ORAL at 05:29

## 2018-11-07 RX ADMIN — INSULIN GLARGINE 20 UNIT(S): 100 INJECTION, SOLUTION SUBCUTANEOUS at 22:19

## 2018-11-07 RX ADMIN — Medication 6: at 09:04

## 2018-11-07 RX ADMIN — Medication 2 UNIT(S): at 12:37

## 2018-11-07 RX ADMIN — Medication 137 MICROGRAM(S): at 05:29

## 2018-11-07 RX ADMIN — PANTOPRAZOLE SODIUM 40 MILLIGRAM(S): 20 TABLET, DELAYED RELEASE ORAL at 05:29

## 2018-11-07 RX ADMIN — PANTOPRAZOLE SODIUM 40 MILLIGRAM(S): 20 TABLET, DELAYED RELEASE ORAL at 18:42

## 2018-11-07 RX ADMIN — Medication 600 MILLIGRAM(S): at 00:11

## 2018-11-07 RX ADMIN — ATORVASTATIN CALCIUM 40 MILLIGRAM(S): 80 TABLET, FILM COATED ORAL at 22:18

## 2018-11-07 RX ADMIN — HEPARIN SODIUM 5000 UNIT(S): 5000 INJECTION INTRAVENOUS; SUBCUTANEOUS at 06:52

## 2018-11-07 RX ADMIN — Medication 10 UNIT(S): at 12:37

## 2018-11-07 RX ADMIN — Medication 650 MILLIGRAM(S): at 17:55

## 2018-11-07 RX ADMIN — GABAPENTIN 100 MILLIGRAM(S): 400 CAPSULE ORAL at 05:29

## 2018-11-07 NOTE — DISCHARGE NOTE ADULT - MEDICATION SUMMARY - MEDICATIONS TO STOP TAKING
I will STOP taking the medications listed below when I get home from the hospital:    Plavix 75 mg oral tablet  -- 1 tab(s) by mouth once a day    losartan-hydrochlorothiazide 50mg-12.5mg oral tablet  -- 1 tab(s) by mouth once a day

## 2018-11-07 NOTE — PROGRESS NOTE ADULT - SUBJECTIVE AND OBJECTIVE BOX
Patient is a 68y old  Female who presents with a chief complaint of Chest pain (06 Nov 2018 02:33)    OVERNIGHT EVENTS:  Patient seen and examined at bedside. Patient reports she feels a little better with the chest pain now 5/10 but still sharp and located at the center of her chest. She also complains abdominal pain .   Last BM yesterday      ROS: No light headedness/dizziness, difficulty breathing/cough, fevers/chills, +abdominal pain, n/v, diarrhea/constipation, dysuria or increased urinary frequency. All other ROS negative     Vital Signs Last 24 Hrs  T(C): 37.2 (07 Nov 2018 05:26), Max: 37.2 (06 Nov 2018 19:30)  T(F): 98.9 (07 Nov 2018 05:26), Max: 99 (06 Nov 2018 21:36)  HR: 64 (07 Nov 2018 05:26) (57 - 72)  BP: 133/69 (07 Nov 2018 05:26) (80/50 - 187/92)  RR: 16 (07 Nov 2018 05:26) (15 - 21)  SpO2: 95% (07 Nov 2018 05:26) (95% - 98%)    Physical Exam:   GENERAL: NAD, well-groomed, well-developed  HEENT: eomi, perrla ncat   NERVOUS SYSTEM:  Alert & Oriented X3, Good concentration; Motor Strength 5/5 B/L upper and lower extremities, Right partial lower face paralysis; CNs grossly intact.  RESP: Clear to auscultation bilaterally; No rales, rhonchi, or wheezing  CVS: Regular rate and rhythm; AS murmur, No rubs, or gallops, Midline scar, Chest pain reproducible with palpation  GI: Large 5-6cm cystic mass under left breast, LArge but Soft abdomen, mildly tender to palpation throughout, Bowel sounds present  EXTREMITIES:  2+ Peripheral Pulses, No clubbing, cyanosis, or edema, onychomycosis in all fingernails, swollen and pink coloration of peripheral digits of the hand      Labs                         12.3   7.1   )-----------( 149      ( 07 Nov 2018 06:33 )             38.5     11-06    141  |  104  |  17.0  ----------------------------<  207<H>  3.6   |  25.0  |  0.86    Ca    9.4      06 Nov 2018 04:40  Phos  3.0     11-06  Mg     1.9     11-06    TPro  7.2  /  Alb  3.8  /  TBili  0.2<L>  /  DBili  x   /  AST  16  /  ALT  10  /  AlkPhos  104  11-05            Troponin T, Serum (11.06.18 @ 04:40)    Troponin T, Serum: <0.01 ng/mL      MEDICATIONS  (STANDING):  amLODIPine   Tablet 10 milliGRAM(s) Oral daily  aspirin  chewable 81 milliGRAM(s) Oral daily  atorvastatin 40 milliGRAM(s) Oral at bedtime  carvedilol 12.5 milliGRAM(s) Oral every 12 hours  dextrose 5%. 1000 milliLiter(s) (50 mL/Hr) IV Continuous <Continuous>  dextrose 50% Injectable 12.5 Gram(s) IV Push once  dextrose 50% Injectable 25 Gram(s) IV Push once  dextrose 50% Injectable 25 Gram(s) IV Push once  furosemide    Tablet 40 milliGRAM(s) Oral two times a day  gabapentin 100 milliGRAM(s) Oral two times a day  heparin  Injectable 5000 Unit(s) SubCutaneous every 8 hours  insulin glargine Injectable (LANTUS) 15 Unit(s) SubCutaneous at bedtime  insulin lispro (HumaLOG) corrective regimen sliding scale   SubCutaneous three times a day before meals  insulin lispro Injectable (HumaLOG) 2 Unit(s) SubCutaneous three times a day before meals  levothyroxine 137 MICROGram(s) Oral daily  losartan 50 milliGRAM(s) Oral daily  pantoprazole    Tablet 40 milliGRAM(s) Oral two times a day    MEDICATIONS  (PRN):  acetaminophen   Tablet .. 650 milliGRAM(s) Oral every 6 hours PRN Mild Pain (1 - 3)  dextrose 40% Gel 15 Gram(s) Oral once PRN Blood Glucose LESS THAN 70 milliGRAM(s)/deciliter  glucagon  Injectable 1 milliGRAM(s) IntraMuscular once PRN Glucose LESS THAN 70 milligrams/deciliter  hydrALAZINE Injectable 10 milliGRAM(s) IV Push every 6 hours PRN SBP above 175 Patient is a 68y old  Female who presents with a chief complaint of Chest pain (06 Nov 2018 02:33)    OVERNIGHT EVENTS:  Patient seen and examined at bedside. Patient reports she feels a little better with the chest pain now 5/10 but still sharp and located at the center of her chest. She also complains abdominal pain .   Last BM yesterday      ROS: No light headedness/dizziness, difficulty breathing/cough, fevers/chills, +abdominal pain, n/v, diarrhea/constipation, dysuria or increased urinary frequency. All other ROS negative     Vital Signs Last 24 Hrs  T(C): 37.2 (07 Nov 2018 05:26), Max: 37.2 (06 Nov 2018 19:30)  T(F): 98.9 (07 Nov 2018 05:26), Max: 99 (06 Nov 2018 21:36)  HR: 64 (07 Nov 2018 05:26) (57 - 72)  BP: 133/69 (07 Nov 2018 05:26) (80/50 - 187/92)  RR: 16 (07 Nov 2018 05:26) (15 - 21)  SpO2: 95% (07 Nov 2018 05:26) (95% - 98%)    Physical Exam:   GENERAL: NAD, well-groomed, well-developed  HEENT: eomi, perrla ncat   NERVOUS SYSTEM:  Alert & Oriented X3, Good concentration; Motor Strength 5/5 B/L upper and lower extremities, Right partial lower face paralysis; CNs grossly intact.  RESP: Clear to auscultation bilaterally; No rales, rhonchi, or wheezing  CVS: Regular rate and rhythm; AS murmur, No rubs, or gallops, Midline scar, Chest pain reproducible with palpation  GI: Large 5-6cm cystic mass under left breast, LArge but Soft abdomen, mildly tender to palpation throughout, Bowel sounds present  EXTREMITIES:  Right groin w/o hematoma , no bleeding , 2+ Peripheral Pulses, No clubbing, cyanosis, or edema, onychomycosis in all fingernails, swollen and pink coloration of peripheral digits of the hand      Labs                         12.3   7.1   )-----------( 149      ( 07 Nov 2018 06:33 )             38.5     11-06    141  |  104  |  17.0  ----------------------------<  207<H>  3.6   |  25.0  |  0.86    Ca    9.4      06 Nov 2018 04:40  Phos  3.0     11-06  Mg     1.9     11-06    TPro  7.2  /  Alb  3.8  /  TBili  0.2<L>  /  DBili  x   /  AST  16  /  ALT  10  /  AlkPhos  104  11-05            Troponin T, Serum (11.06.18 @ 04:40)    Troponin T, Serum: <0.01 ng/mL      MEDICATIONS  (STANDING):  amLODIPine   Tablet 10 milliGRAM(s) Oral daily  aspirin  chewable 81 milliGRAM(s) Oral daily  atorvastatin 40 milliGRAM(s) Oral at bedtime  carvedilol 12.5 milliGRAM(s) Oral every 12 hours  dextrose 5%. 1000 milliLiter(s) (50 mL/Hr) IV Continuous <Continuous>  dextrose 50% Injectable 12.5 Gram(s) IV Push once  dextrose 50% Injectable 25 Gram(s) IV Push once  dextrose 50% Injectable 25 Gram(s) IV Push once  furosemide    Tablet 40 milliGRAM(s) Oral two times a day  gabapentin 100 milliGRAM(s) Oral two times a day  heparin  Injectable 5000 Unit(s) SubCutaneous every 8 hours  insulin glargine Injectable (LANTUS) 15 Unit(s) SubCutaneous at bedtime  insulin lispro (HumaLOG) corrective regimen sliding scale   SubCutaneous three times a day before meals  insulin lispro Injectable (HumaLOG) 2 Unit(s) SubCutaneous three times a day before meals  levothyroxine 137 MICROGram(s) Oral daily  losartan 50 milliGRAM(s) Oral daily  pantoprazole    Tablet 40 milliGRAM(s) Oral two times a day    MEDICATIONS  (PRN):  acetaminophen   Tablet .. 650 milliGRAM(s) Oral every 6 hours PRN Mild Pain (1 - 3)  dextrose 40% Gel 15 Gram(s) Oral once PRN Blood Glucose LESS THAN 70 milliGRAM(s)/deciliter  glucagon  Injectable 1 milliGRAM(s) IntraMuscular once PRN Glucose LESS THAN 70 milligrams/deciliter  hydrALAZINE Injectable 10 milliGRAM(s) IV Push every 6 hours PRN SBP above 175 Patient is a 68y old  Female who presents with a chief complaint of Chest pain (06 Nov 2018 02:33)    OVERNIGHT EVENTS:  Patient seen and examined at bedside. Patient reports she feels a little better with the chest pain now 5/10 but still sharp and located at the center of her chest. She also complains abdominal pain .   Last BM yesterday      ROS: No light headedness/dizziness, difficulty breathing/cough, fevers/chills, +abdominal pain, n/v, diarrhea/constipation, dysuria or increased urinary frequency. All other ROS negative     Vital Signs Last 24 Hrs  T(C): 37.2 (07 Nov 2018 05:26), Max: 37.2 (06 Nov 2018 19:30)  T(F): 98.9 (07 Nov 2018 05:26), Max: 99 (06 Nov 2018 21:36)  HR: 64 (07 Nov 2018 05:26) (57 - 72)  BP: 133/69 (07 Nov 2018 05:26) (80/50 - 187/92)  RR: 16 (07 Nov 2018 05:26) (15 - 21)  SpO2: 95% (07 Nov 2018 05:26) (95% - 98%)  c/s 381-121    Physical Exam:   GENERAL: NAD, well-groomed, well-developed. obese  HEENT: eomi, perrla ncat   NERVOUS SYSTEM:  Alert & Oriented X3, Good concentration; Motor Strength 5/5 B/L upper and lower extremities, Right partial lower face paralysis; CNs grossly intact.  RESP: Clear to auscultation bilaterally; No rales, rhonchi, or wheezing  CVS: Regular rate and rhythm; AS murmur, No rubs, or gallops, Midline scar, Chest pain reproducible with palpation  GI: Large 5-6cm cystic mass under left breast, LArge but Soft abdomen, mildly tender to palpation throughout, Bowel sounds present  EXTREMITIES:  Right groin w/o hematoma , no bleeding , 2+ Peripheral Pulses, No clubbing, cyanosis, or edema, onychomycosis in all fingernails, swollen and pink coloration of peripheral digits of the hand      Labs                         12.3   7.1   )-----------( 149      ( 07 Nov 2018 06:33 )             38.5     11-06    141  |  104  |  17.0  ----------------------------<  207<H>  3.6   |  25.0  |  0.86    Ca    9.4      06 Nov 2018 04:40  Phos  3.0     11-06  Mg     1.9     11-06    TPro  7.2  /  Alb  3.8  /  TBili  0.2<L>  /  DBili  x   /  AST  16  /  ALT  10  /  AlkPhos  104  11-05            Troponin T, Serum (11.06.18 @ 04:40)    Troponin T, Serum: <0.01 ng/mL      MEDICATIONS  (STANDING):  amLODIPine   Tablet 10 milliGRAM(s) Oral daily  aspirin  chewable 81 milliGRAM(s) Oral daily  atorvastatin 40 milliGRAM(s) Oral at bedtime  carvedilol 12.5 milliGRAM(s) Oral every 12 hours  dextrose 5%. 1000 milliLiter(s) (50 mL/Hr) IV Continuous <Continuous>  dextrose 50% Injectable 12.5 Gram(s) IV Push once  dextrose 50% Injectable 25 Gram(s) IV Push once  dextrose 50% Injectable 25 Gram(s) IV Push once  furosemide    Tablet 40 milliGRAM(s) Oral two times a day  gabapentin 100 milliGRAM(s) Oral two times a day  heparin  Injectable 5000 Unit(s) SubCutaneous every 8 hours  insulin glargine Injectable (LANTUS) 15 Unit(s) SubCutaneous at bedtime  insulin lispro (HumaLOG) corrective regimen sliding scale   SubCutaneous three times a day before meals  insulin lispro Injectable (HumaLOG) 2 Unit(s) SubCutaneous three times a day before meals  levothyroxine 137 MICROGram(s) Oral daily  losartan 50 milliGRAM(s) Oral daily  pantoprazole    Tablet 40 milliGRAM(s) Oral two times a day    MEDICATIONS  (PRN):  acetaminophen   Tablet .. 650 milliGRAM(s) Oral every 6 hours PRN Mild Pain (1 - 3)  dextrose 40% Gel 15 Gram(s) Oral once PRN Blood Glucose LESS THAN 70 milliGRAM(s)/deciliter  glucagon  Injectable 1 milliGRAM(s) IntraMuscular once PRN Glucose LESS THAN 70 milligrams/deciliter  hydrALAZINE Injectable 10 milliGRAM(s) IV Push every 6 hours PRN SBP above 175

## 2018-11-07 NOTE — DISCHARGE NOTE ADULT - MEDICATION SUMMARY - MEDICATIONS TO TAKE
I will START or STAY ON the medications listed below when I get home from the hospital:    aspirin 81 mg oral tablet  -- 1 tab(s) by mouth once a day  -- Indication: For CVA (cerebral vascular accident)    Neurontin 100 mg oral capsule  -- 1 cap(s) by mouth 2 times a day  -- Indication: For Diabetic Neuropathy    NovoLOG 100 units/mL injectable solution  -- 10 unit(s) injectable 3 times a day  -- Indication: For Diabetes mellitus    HumaLOG 100 units/mL subcutaneous solution  --  subcutaneous 3 times a day (with meals)  -- Sliding sclae  -- Indication: For Diabetes mellitus    Lantus 100 units/mL subcutaneous solution  -- 25 unit(s) subcutaneous once a day (at bedtime)  -- Indication: For Diabetes mellitus    atorvastatin 40 mg oral tablet  -- 1 tab(s) by mouth once a day (at bedtime)  -- Indication: For Coronary artery disease    carvedilol 12.5 mg oral tablet  -- 1 tab(s) by mouth every 12 hours  -- Indication: For Essential hypertension    potassium chloride 20 mEq oral tablet, extended release  -- 1 tab(s) by mouth once a day  -- Indication: For Supplementation I will START or STAY ON the medications listed below when I get home from the hospital:    aspirin 81 mg oral tablet  -- 1 tab(s) by mouth once a day  -- Indication: For CVA (cerebral vascular accident)    losartan 50 mg oral tablet  -- 1 tab(s) by mouth once a day  -- Indication: For Hypertension    Neurontin 100 mg oral capsule  -- 1 cap(s) by mouth 2 times a day  -- Indication: For Diabetic Neuropathy    HumaLOG 100 units/mL subcutaneous solution  --  subcutaneous 3 times a day (with meals)  -- Sliding sclae  -- Indication: For Diabetes mellitus    atorvastatin 40 mg oral tablet  -- 1 tab(s) by mouth once a day (at bedtime)  -- Indication: For Hypercholesterolemia    carvedilol 12.5 mg oral tablet  -- 1 tab(s) by mouth every 12 hours  -- Indication: For Hypertension    amLODIPine 10 mg oral tablet  -- 1 tab(s) by mouth once a day  -- Indication: For Hypertesion    potassium chloride 20 mEq oral tablet, extended release  -- 1 tab(s) by mouth once a day  -- Indication: For Supplementation    pantoprazole 40 mg oral delayed release tablet  -- 1 tab(s) by mouth 2 times a day  -- Indication: For Acid Reflux    levothyroxine 150 mcg (0.15 mg) oral tablet  -- 1 tab(s) by mouth once a day  -- Indication: For Hypothyroid

## 2018-11-07 NOTE — DISCHARGE NOTE ADULT - CARE PROVIDERS DIRECT ADDRESSES
[email or web address]

## 2018-11-07 NOTE — DISCHARGE NOTE ADULT - MEDICATION SUMMARY - MEDICATIONS TO CHANGE
I will SWITCH the dose or number of times a day I take the medications listed below when I get home from the hospital:    carvedilol 25 mg oral tablet  -- 1 tab(s) by mouth 2 times a day    levothyroxine 137 mcg (0.137 mg) oral capsule  -- 1 cap(s) by mouth once a day    pantoprazole 40 mg oral delayed release tablet  -- 1 tab(s) by mouth once a day    Lasix 40 mg oral tablet  -- 1 tab(s) by mouth 2 times a day

## 2018-11-07 NOTE — DISCHARGE NOTE ADULT - PLAN OF CARE
stable ,continue treatment with home medications -during this hospitalization you were evaluated due to chest pain /palpitation   -you had a cardiac cath done that did not show any any new  changes, your coronary graft are patent   -continue treatment with your home medications and do not stop any of these medication without talk to you cardiology first.   -follow up with your cardiology Dr. Mandujano in one week  -follow up with your primary care doctor Dr. Serrano Hemoglobin A1c< 8.5 -continue treatment with home medications   -take and use your medication as prescribe by your doctor   -during this hospitalization you hemoglobin A1c= 8.9 %, you need better control of you blood sugar.   -follow up with your primary care doctor Dr. Serrano in about one week  -follow you diet and exercise ( walking)  at least 30 minutes five days per week LDL < 70 mg/dl -continue treatment with 40 mg daily  -follow up with your primary care doctor Dr. Serrano in about one week euthyroid state - during this hospitalization your TSH= 8.9  -you will need outpatient follow up with your primary care doctor Dr. Serrano and adjust the dose of your levothyroxine   -take your medications as prescribe outpatient follow up -during this hospita Hemoglobin A1c< 8.0 - Since you have diabetes it is important to keep your LDL < 70.   - Increase your dose of atorvastatin from 40 to 80mg daily  -follow up with your primary care doctor Dr. Serrano in about one week - during this hospitalization your TSH= 8.9  -you will need outpatient follow up with your primary care doctor Dr. Serrano to discuss adjusting the dose of your levothyroxine.  -take your medications as he prescribes - You came in with and continue to have abdominal pain. You have a hiatal hernia which means that a small part of your stomach has pushed through your diaphragm where it is not supposed to be. This is an explanation for at least some of your abdominal pain. Continue with protonix. Follow up with Dr. Perez on 11/15 to discuss outpatient treatments for this problem. Resolved Continue taking aspirin and statin;  Follow up with your neurologist in the next 2-3 days, PMD within the next week.  A transient ischemic attack (TIA) is also called a mini-stroke. A TIA happens when blood cannot flow to part of your brain. A TIA lasts a short time, and the effects resolve in less than 24 hours. A TIA does not cause lasting damage, but it may be a warning sign before an ischemic stroke occurs. An ischemic stroke happens when blood flow to the brain is blocked, usually by a blood clot.  Call 911 if you experience any of the following signs of a stroke: Numbness or drooping on one side of your face.  Weakness in an arm or leg.  Confusion or difficulty speaking. Dizziness, a severe headache, or vision loss.  Seek care immediately if you have double vision or vision loss, have a severe headache or dizziness, are bleeding from your rectum or nose.  Antiplatelets prevent blood clots from forming. Aspirin is an antiplatelet. Take your aspirin, and do NOT substitute acetaminophen or ibuprofen. Keep BP < 140/90 Please continue to take all medications as prescribed. Follow up with your PMD within a week. Eat a DASH diet outlined above  Please check your Blood pressure at home and if the top number is always over 150 or if the bottom number is always over 100, please call your doctor Along with your high BP, you had a headache, chest pain and new onset speech slurring. Given your history of stroke, it is improtant that you take your medications as advised and keep your blood pressure < 140/90. Please discontinue Plavix, and follow the changes to your medications outlined. Follow up with your PMD within a week. Eat a DASH diet outlined above.  Please check your Blood pressure at home and if the top number is always over 150 or if the bottom number is always over 100, please call your doctor. -Please start taking your Lantus at 20 units for the first few days and measure your blood sugars. If your blood sugar is not too low you may increase back to your home dose of 28 units. If it stays low do not increase your dose and speak with your primary care doctor for dosage adjustment.  -take and use your medication as prescribe by your doctor   -during this hospitalization you hemoglobin A1c= 8.9 %, you need better control of you blood sugar.   -follow up with your primary care doctor Dr. Serrano in about one week  -follow your diet and exercise ( walking)  at least 30 minutes five days per week

## 2018-11-07 NOTE — DISCHARGE NOTE ADULT - CARE PLAN
Principal Discharge DX:	Coronary artery disease  Goal:	stable ,continue treatment with home medications  Assessment and plan of treatment:	-during this hospitalization you were evaluated due to chest pain /palpitation   -you had a cardiac cath done that did not show any any new  changes, your coronary graft are patent   -continue treatment with your home medications and do not stop any of these medication without talk to you cardiology first.   -follow up with your cardiology Dr. Mandujano in one week  -follow up with your primary care doctor Dr. Serrano  Secondary Diagnosis:	Diabetes mellitus  Goal:	Hemoglobin A1c< 8.5  Assessment and plan of treatment:	-continue treatment with home medications   -take and use your medication as prescribe by your doctor   -during this hospitalization you hemoglobin A1c= 8.9 %, you need better control of you blood sugar.   -follow up with your primary care doctor Dr. Serrano in about one week  -follow you diet and exercise ( walking)  at least 30 minutes five days per week  Secondary Diagnosis:	Hypercholesterolemia  Goal:	LDL < 70 mg/dl  Assessment and plan of treatment:	-continue treatment with 40 mg daily  -follow up with your primary care doctor Dr. Serrano in about one week  Secondary Diagnosis:	Hypothyroid  Goal:	euthyroid state  Assessment and plan of treatment:	- during this hospitalization your TSH= 8.9  -you will need outpatient follow up with your primary care doctor Dr. Serrano and adjust the dose of your levothyroxine   -take your medications as prescribe  Secondary Diagnosis:	Hiatal hernia  Goal:	outpatient follow up  Assessment and plan of treatment:	[facility]pert[facility]  Secondary Diagnosis:	Diabetes mellitus  Goal:	Hemoglobin A1c< 8.0  Assessment and plan of treatment:	-continue treatment with home medications   -take and use your medication as prescribe by your doctor   -during this hospitalization you hemoglobin A1c= 8.9 %, you need better control of you blood sugar.   -follow up with your primary care doctor Dr. Serrano in about one week  -follow you diet and exercise ( walking)  at least 30 minutes five days per week  Secondary Diagnosis:	Hypercholesterolemia  Goal:	LDL < 70 mg/dl  Assessment and plan of treatment:	- Since you have diabetes it is important to keep your LDL < 70.   - Increase your dose of atorvastatin from 40 to 80mg daily  -follow up with your primary care doctor Dr. Serrano in about one week  Secondary Diagnosis:	Hypothyroid  Goal:	euthyroid state  Assessment and plan of treatment:	- during this hospitalization your TSH= 8.9  -you will need outpatient follow up with your primary care doctor Dr. Serrano to discuss adjusting the dose of your levothyroxine.  -take your medications as he prescribes  Secondary Diagnosis:	Hiatal hernia  Goal:	outpatient follow up  Assessment and plan of treatment:	- You came in with and continue to have abdominal pain. You have a hiatal hernia which means that a small part of your stomach has pushed through your diaphragm where it is not supposed to be. This is an explanation for at least some of your abdominal pain. Continue with protonix. Follow up with Dr. Perez on 11/15 to discuss outpatient treatments for this problem.  Secondary Diagnosis:	Coronary artery disease involving coronary bypass graft of native heart with angina pectoris  Goal:	stable ,continue treatment with home medications  Assessment and plan of treatment:	-during this hospitalization you were evaluated due to chest pain /palpitation   -you had a cardiac cath done that did not show any any new  changes, your coronary graft are patent   -continue treatment with your home medications and do not stop any of these medication without talk to you cardiology first.   -follow up with your cardiology Dr. Mandujano in one week  -follow up with your primary care doctor Dr. Serrano  Secondary Diagnosis:	TIA (transient ischemic attack)  Goal:	Resolved  Assessment and plan of treatment:	Continue taking aspirin and statin;  Follow up with your neurologist in the next 2-3 days, PMD within the next week.  A transient ischemic attack (TIA) is also called a mini-stroke. A TIA happens when blood cannot flow to part of your brain. A TIA lasts a short time, and the effects resolve in less than 24 hours. A TIA does not cause lasting damage, but it may be a warning sign before an ischemic stroke occurs. An ischemic stroke happens when blood flow to the brain is blocked, usually by a blood clot.  Call 911 if you experience any of the following signs of a stroke: Numbness or drooping on one side of your face.  Weakness in an arm or leg.  Confusion or difficulty speaking. Dizziness, a severe headache, or vision loss.  Seek care immediately if you have double vision or vision loss, have a severe headache or dizziness, are bleeding from your rectum or nose.  Antiplatelets prevent blood clots from forming. Aspirin is an antiplatelet. Take your aspirin, and do NOT substitute acetaminophen or ibuprofen. Principal Discharge DX:	Hypertensive emergency  Goal:	Keep BP < 140/90  Assessment and plan of treatment:	Please continue to take all medications as prescribed. Follow up with your PMD within a week. Eat a DASH diet outlined above  Please check your Blood pressure at home and if the top number is always over 150 or if the bottom number is always over 100, please call your doctor  Secondary Diagnosis:	Diabetes mellitus  Goal:	Hemoglobin A1c< 8.0  Assessment and plan of treatment:	-continue treatment with home medications   -take and use your medication as prescribe by your doctor   -during this hospitalization you hemoglobin A1c= 8.9 %, you need better control of you blood sugar.   -follow up with your primary care doctor Dr. Serrano in about one week  -follow you diet and exercise ( walking)  at least 30 minutes five days per week  Secondary Diagnosis:	Hypercholesterolemia  Goal:	LDL < 70 mg/dl  Assessment and plan of treatment:	- Since you have diabetes it is important to keep your LDL < 70.   - Increase your dose of atorvastatin from 40 to 80mg daily  -follow up with your primary care doctor Dr. Serrano in about one week  Secondary Diagnosis:	Hypothyroid  Goal:	euthyroid state  Assessment and plan of treatment:	- during this hospitalization your TSH= 8.9  -you will need outpatient follow up with your primary care doctor Dr. Serrano to discuss adjusting the dose of your levothyroxine.  -take your medications as he prescribes  Secondary Diagnosis:	Hiatal hernia  Goal:	outpatient follow up  Assessment and plan of treatment:	- You came in with and continue to have abdominal pain. You have a hiatal hernia which means that a small part of your stomach has pushed through your diaphragm where it is not supposed to be. This is an explanation for at least some of your abdominal pain. Continue with protonix. Follow up with Dr. Perez on 11/15 to discuss outpatient treatments for this problem.  Secondary Diagnosis:	Coronary artery disease involving coronary bypass graft of native heart with angina pectoris  Goal:	stable ,continue treatment with home medications  Assessment and plan of treatment:	-during this hospitalization you were evaluated due to chest pain /palpitation   -you had a cardiac cath done that did not show any any new  changes, your coronary graft are patent   -continue treatment with your home medications and do not stop any of these medication without talk to you cardiology first.   -follow up with your cardiology Dr. Mandujano in one week  -follow up with your primary care doctor Dr. Serrano  Secondary Diagnosis:	TIA (transient ischemic attack)  Goal:	Resolved  Assessment and plan of treatment:	Continue taking aspirin and statin;  Follow up with your neurologist in the next 2-3 days, PMD within the next week.  A transient ischemic attack (TIA) is also called a mini-stroke. A TIA happens when blood cannot flow to part of your brain. A TIA lasts a short time, and the effects resolve in less than 24 hours. A TIA does not cause lasting damage, but it may be a warning sign before an ischemic stroke occurs. An ischemic stroke happens when blood flow to the brain is blocked, usually by a blood clot.  Call 911 if you experience any of the following signs of a stroke: Numbness or drooping on one side of your face.  Weakness in an arm or leg.  Confusion or difficulty speaking. Dizziness, a severe headache, or vision loss.  Seek care immediately if you have double vision or vision loss, have a severe headache or dizziness, are bleeding from your rectum or nose.  Antiplatelets prevent blood clots from forming. Aspirin is an antiplatelet. Take your aspirin, and do NOT substitute acetaminophen or ibuprofen. Principal Discharge DX:	Hypertensive emergency  Goal:	Keep BP < 140/90  Assessment and plan of treatment:	Along with your high BP, you had a headache, chest pain and new onset speech slurring. Given your history of stroke, it is improtant that you take your medications as advised and keep your blood pressure < 140/90. Please discontinue Plavix, and follow the changes to your medications outlined. Follow up with your PMD within a week. Eat a DASH diet outlined above.  Please check your Blood pressure at home and if the top number is always over 150 or if the bottom number is always over 100, please call your doctor.  Secondary Diagnosis:	Diabetes mellitus  Goal:	Hemoglobin A1c< 8.0  Assessment and plan of treatment:	-continue treatment with home medications   -take and use your medication as prescribe by your doctor   -during this hospitalization you hemoglobin A1c= 8.9 %, you need better control of you blood sugar.   -follow up with your primary care doctor Dr. Serrano in about one week  -follow you diet and exercise ( walking)  at least 30 minutes five days per week  Secondary Diagnosis:	Hypercholesterolemia  Goal:	LDL < 70 mg/dl  Assessment and plan of treatment:	- Since you have diabetes it is important to keep your LDL < 70.   - Increase your dose of atorvastatin from 40 to 80mg daily  -follow up with your primary care doctor Dr. Serrano in about one week  Secondary Diagnosis:	Hypothyroid  Goal:	euthyroid state  Assessment and plan of treatment:	- during this hospitalization your TSH= 8.9  -you will need outpatient follow up with your primary care doctor Dr. Serrano to discuss adjusting the dose of your levothyroxine.  -take your medications as he prescribes  Secondary Diagnosis:	Hiatal hernia  Goal:	outpatient follow up  Assessment and plan of treatment:	- You came in with and continue to have abdominal pain. You have a hiatal hernia which means that a small part of your stomach has pushed through your diaphragm where it is not supposed to be. This is an explanation for at least some of your abdominal pain. Continue with protonix. Follow up with Dr. Perez on 11/15 to discuss outpatient treatments for this problem.  Secondary Diagnosis:	Coronary artery disease involving coronary bypass graft of native heart with angina pectoris  Goal:	stable ,continue treatment with home medications  Assessment and plan of treatment:	-during this hospitalization you were evaluated due to chest pain /palpitation   -you had a cardiac cath done that did not show any any new  changes, your coronary graft are patent   -continue treatment with your home medications and do not stop any of these medication without talk to you cardiology first.   -follow up with your cardiology Dr. Mandujano in one week  -follow up with your primary care doctor Dr. Serrano  Secondary Diagnosis:	TIA (transient ischemic attack)  Goal:	Resolved  Assessment and plan of treatment:	Continue taking aspirin and statin;  Follow up with your neurologist in the next 2-3 days, PMD within the next week.  A transient ischemic attack (TIA) is also called a mini-stroke. A TIA happens when blood cannot flow to part of your brain. A TIA lasts a short time, and the effects resolve in less than 24 hours. A TIA does not cause lasting damage, but it may be a warning sign before an ischemic stroke occurs. An ischemic stroke happens when blood flow to the brain is blocked, usually by a blood clot.  Call 911 if you experience any of the following signs of a stroke: Numbness or drooping on one side of your face.  Weakness in an arm or leg.  Confusion or difficulty speaking. Dizziness, a severe headache, or vision loss.  Seek care immediately if you have double vision or vision loss, have a severe headache or dizziness, are bleeding from your rectum or nose.  Antiplatelets prevent blood clots from forming. Aspirin is an antiplatelet. Take your aspirin, and do NOT substitute acetaminophen or ibuprofen. Principal Discharge DX:	Hypertensive emergency  Goal:	Keep BP < 140/90  Assessment and plan of treatment:	Along with your high BP, you had a headache, chest pain and new onset speech slurring. Given your history of stroke, it is improtant that you take your medications as advised and keep your blood pressure < 140/90. Please discontinue Plavix, and follow the changes to your medications outlined. Follow up with your PMD within a week. Eat a DASH diet outlined above.  Please check your Blood pressure at home and if the top number is always over 150 or if the bottom number is always over 100, please call your doctor.  Secondary Diagnosis:	Diabetes mellitus  Goal:	Hemoglobin A1c< 8.0  Assessment and plan of treatment:	-Please start taking your Lantus at 20 units for the first few days and measure your blood sugars. If your blood sugar is not too low you may increase back to your home dose of 28 units. If it stays low do not increase your dose and speak with your primary care doctor for dosage adjustment.  -take and use your medication as prescribe by your doctor   -during this hospitalization you hemoglobin A1c= 8.9 %, you need better control of you blood sugar.   -follow up with your primary care doctor Dr. Serrano in about one week  -follow your diet and exercise ( walking)  at least 30 minutes five days per week  Secondary Diagnosis:	Hypercholesterolemia  Goal:	LDL < 70 mg/dl  Assessment and plan of treatment:	- Since you have diabetes it is important to keep your LDL < 70.   - Increase your dose of atorvastatin from 40 to 80mg daily  -follow up with your primary care doctor Dr. Serrano in about one week  Secondary Diagnosis:	Hypothyroid  Goal:	euthyroid state  Assessment and plan of treatment:	- during this hospitalization your TSH= 8.9  -you will need outpatient follow up with your primary care doctor Dr. Serrano to discuss adjusting the dose of your levothyroxine.  -take your medications as he prescribes  Secondary Diagnosis:	Hiatal hernia  Goal:	outpatient follow up  Assessment and plan of treatment:	- You came in with and continue to have abdominal pain. You have a hiatal hernia which means that a small part of your stomach has pushed through your diaphragm where it is not supposed to be. This is an explanation for at least some of your abdominal pain. Continue with protonix. Follow up with Dr. Perez on 11/15 to discuss outpatient treatments for this problem.  Secondary Diagnosis:	Coronary artery disease involving coronary bypass graft of native heart with angina pectoris  Goal:	stable ,continue treatment with home medications  Assessment and plan of treatment:	-during this hospitalization you were evaluated due to chest pain /palpitation   -you had a cardiac cath done that did not show any any new  changes, your coronary graft are patent   -continue treatment with your home medications and do not stop any of these medication without talk to you cardiology first.   -follow up with your cardiology Dr. Mandujano in one week  -follow up with your primary care doctor Dr. Serrano  Secondary Diagnosis:	TIA (transient ischemic attack)  Goal:	Resolved  Assessment and plan of treatment:	Continue taking aspirin and statin;  Follow up with your neurologist in the next 2-3 days, PMD within the next week.  A transient ischemic attack (TIA) is also called a mini-stroke. A TIA happens when blood cannot flow to part of your brain. A TIA lasts a short time, and the effects resolve in less than 24 hours. A TIA does not cause lasting damage, but it may be a warning sign before an ischemic stroke occurs. An ischemic stroke happens when blood flow to the brain is blocked, usually by a blood clot.  Call 911 if you experience any of the following signs of a stroke: Numbness or drooping on one side of your face.  Weakness in an arm or leg.  Confusion or difficulty speaking. Dizziness, a severe headache, or vision loss.  Seek care immediately if you have double vision or vision loss, have a severe headache or dizziness, are bleeding from your rectum or nose.  Antiplatelets prevent blood clots from forming. Aspirin is an antiplatelet. Take your aspirin, and do NOT substitute acetaminophen or ibuprofen.

## 2018-11-07 NOTE — PROGRESS NOTE ADULT - SUBJECTIVE AND OBJECTIVE BOX
[facility]  [facility]ACT[facility]  [address] [place] [address]  P   F     REASON FOR VISIT:  UPDATE:    MEDICATIONS  (STANDING):  amLODIPine   Tablet 10 milliGRAM(s) Oral daily  aspirin  chewable 81 milliGRAM(s) Oral daily  atorvastatin 40 milliGRAM(s) Oral at bedtime  carvedilol 12.5 milliGRAM(s) Oral every 12 hours  dextrose 5%. 1000 milliLiter(s) (50 mL/Hr) IV Continuous <Continuous>  dextrose 50% Injectable 12.5 Gram(s) IV Push once  dextrose 50% Injectable 25 Gram(s) IV Push once  dextrose 50% Injectable 25 Gram(s) IV Push once  furosemide    Tablet 40 milliGRAM(s) Oral two times a day  gabapentin 100 milliGRAM(s) Oral two times a day  heparin  Injectable 5000 Unit(s) SubCutaneous every 8 hours  insulin glargine Injectable (LANTUS) 15 Unit(s) SubCutaneous at bedtime  insulin lispro (HumaLOG) corrective regimen sliding scale   SubCutaneous three times a day before meals  insulin lispro Injectable (HumaLOG) 2 Unit(s) SubCutaneous three times a day before meals  levothyroxine 137 MICROGram(s) Oral daily  losartan 50 milliGRAM(s) Oral daily  pantoprazole    Tablet 40 milliGRAM(s) Oral two times a day      MEDICATIONS  (PRN):  acetaminophen   Tablet .. 650 milliGRAM(s) Oral every 6 hours PRN Mild Pain (1 - 3)  dextrose 40% Gel 15 Gram(s) Oral once PRN Blood Glucose LESS THAN 70 milliGRAM(s)/deciliter  glucagon  Injectable 1 milliGRAM(s) IntraMuscular once PRN Glucose LESS THAN 70 milligrams/deciliter  hydrALAZINE Injectable 10 milliGRAM(s) IV Push every 6 hours PRN SBP above 175            PAST MEDICAL & SURGICAL HISTORY:  Breast cancer  Glaucoma  CVA (cerebral vascular accident)  Diabetes mellitus  Hypothyroid  Hypercholesterolemia  Coronary artery disease  HTN (hypertension)  S/P tonsillectomy  S/P appendectomy  S/P cardiac cath: with stents  S/P CABG x 4  S/P breast reconstruction, bilateral  S/P mastectomy      Vital Signs Last 24 Hrs  T(C): 36.6 (07 Nov 2018 10:56), Max: 37.2 (06 Nov 2018 19:30)  T(F): 97.9 (07 Nov 2018 10:56), Max: 99 (06 Nov 2018 21:36)  HR: 65 (07 Nov 2018 10:56) (57 - 72)  BP: 104/67 (07 Nov 2018 10:56) (80/50 - 187/92)  BP(mean): --  RR: 18 (07 Nov 2018 10:56) (15 - 21)  SpO2: 100% (07 Nov 2018 10:56) (95% - 100%)    PHYSICAL EXAM:            LABS:  CBC Full  -  ( 07 Nov 2018 06:33 )  WBC Count : 7.1 K/uL  Hemoglobin : 12.3 g/dL  Hematocrit : 38.5 %  Platelet Count - Automated : 149 K/uL  Mean Cell Volume : 87.5 fl  Mean Cell Hemoglobin : 28.0 pg  Mean Cell Hemoglobin Concentration : 31.9 g/dL    11-07    137  |  98  |  27.0<H>  ----------------------------<  309<H>  4.6   |  23.0  |  1.59<H>    Ca    9.2      07 Nov 2018 06:33  Phos  4.3     11-07  Mg     2.0     11-07    TPro  7.2  /  Alb  3.8  /  TBili  0.2<L>  /  DBili  x   /  AST  16  /  ALT  10  /  AlkPhos  104  11-05    CARDIAC MARKERS ( 06 Nov 2018 11:00 )  x     / <0.01 ng/mL / 94 U/L / x     / x      CARDIAC MARKERS ( 06 Nov 2018 04:40 )  x     / <0.01 ng/mL / 96 U/L / x     / x      CARDIAC MARKERS ( 05 Nov 2018 21:41 )  x     / <0.01 ng/mL / x     / x     / x [place] [facility]  [facility]  [address] [place] [address]  P   F     REASON FOR VISIT:  Follow up on chest discomfort  UPDATE: was hypotensive last pm, had ct of abd no retroperitoneal bleed.  cbc is present today  b/p improved  bun /creat is trending upward   Patient denies further chest discomfort.  Does report MENEZES which has been present for >2 years  Dysarthria has resolved  Echo done results pending    MEDICATIONS:  amLODIPine   Tablet 10 milliGRAM(s) Oral daily  aspirin  chewable 81 milliGRAM(s) Oral daily  atorvastatin 40 milliGRAM(s) Oral at bedtime  carvedilol 12.5 milliGRAM(s) Oral every 12 hours  furosemide    Tablet 40 milliGRAM(s) Oral two times a day  gabapentin 100 milliGRAM(s) Oral two times a day  heparin  Injectable 5000 Unit(s) SubCutaneous every 8 hours  insulin glargine Injectable (LANTUS) 15 Unit(s) SubCutaneous at bedtime  insulin lispro (HumaLOG) corrective regimen sliding scale   SubCutaneous three times a day before meals  insulin lispro Injectable (HumaLOG) 2 Unit(s) SubCutaneous three times a day before meals  levothyroxine 137 MICROGram(s) Oral daily  losartan 50 milliGRAM(s) Oral daily  pantoprazole    Tablet 40 milliGRAM(s) Oral two times a day        Vital Signs Last 24 Hrs  T(C): 36.6 (07 Nov 2018 10:56), Max: 37.2 (06 Nov 2018 19:30)  T(F): 97.9 (07 Nov 2018 10:56), Max: 99 (06 Nov 2018 21:36)  HR: 65 (07 Nov 2018 10:56) (57 - 72)  BP: 104/67 (07 Nov 2018 10:56) (80/50 - 187/92)  BP(mean): --  RR: 18 (07 Nov 2018 10:56) (15 - 21)  SpO2: 100% (07 Nov 2018 10:56) (95% - 100%)    PHYSICAL EXAM:  Sitting in chair in nad  Neck supple  Lungs clear no wheeze no rales  Abd obese soft non tender  Right groin no hematoma no ecchymosis  Neuro alert and oriented        LABS:  CBC Full  -  ( 07 Nov 2018 06:33 )  WBC Count : 7.1 K/uL  Hemoglobin : 12.3 g/dL  Hematocrit : 38.5 %  Platelet Count - Automated : 149 K/uL  Mean Cell Volume : 87.5 fl  Mean Cell Hemoglobin : 28.0 pg  Mean Cell Hemoglobin Concentration : 31.9 g/dL    11-07    137  |  98  |  27.0<H>  ----------------------------<  309<H>  4.6   |  23.0  |  1.59<H>    Ca    9.2      07 Nov 2018 06:33  Phos  4.3     11-07  Mg     2.0     11-07    TPro  7.2  /  Alb  3.8  /  TBili  0.2<L>  /  DBili  x   /  AST  16  /  ALT  10  /  AlkPhos  104  11-05    CARDIAC MARKERS ( 06 Nov 2018 11:00 )  x     / <0.01 ng/mL / 94 U/L / x     / x      CARDIAC MARKERS ( 06 Nov 2018 04:40 )  x     / <0.01 ng/mL / 96 U/L / x     / x      CARDIAC MARKERS ( 05 Nov 2018 21:41 )  x     / <0.01 ng/mL / x     / x     / x [place] [facility]  [facility]/[place]/[facility]  [address] [place] [address]  P   F     REASON FOR VISIT:  Follow up on chest discomfort  UPDATE: was hypotensive last pm, had ct of abd no retroperitoneal bleed.  cbc is present today  b/p improved  bun /creat is trending upward   Patient denies further chest discomfort.  Does report MENEZES which has been present for >2 years  Dysarthria has resolved  Echo done results pending  Telemtry  sinus anthony 45 with sleep but in the 60s when awake    MEDICATIONS:  amLODIPine   Tablet 10 milliGRAM(s) Oral daily  aspirin  chewable 81 milliGRAM(s) Oral daily  atorvastatin 40 milliGRAM(s) Oral at bedtime  carvedilol 12.5 milliGRAM(s) Oral every 12 hours  furosemide    Tablet 40 milliGRAM(s) Oral two times a day  gabapentin 100 milliGRAM(s) Oral two times a day  heparin  Injectable 5000 Unit(s) SubCutaneous every 8 hours  insulin glargine Injectable (LANTUS) 15 Unit(s) SubCutaneous at bedtime  insulin lispro (HumaLOG) corrective regimen sliding scale   SubCutaneous three times a day before meals  insulin lispro Injectable (HumaLOG) 2 Unit(s) SubCutaneous three times a day before meals  levothyroxine 137 MICROGram(s) Oral daily  losartan 50 milliGRAM(s) Oral daily  pantoprazole    Tablet 40 milliGRAM(s) Oral two times a day        Vital Signs Last 24 Hrs  T(C): 36.6 (07 Nov 2018 10:56), Max: 37.2 (06 Nov 2018 19:30)  T(F): 97.9 (07 Nov 2018 10:56), Max: 99 (06 Nov 2018 21:36)  HR: 65 (07 Nov 2018 10:56) (57 - 72)  BP: 104/67 (07 Nov 2018 10:56) (80/50 - 187/92)  BP(mean): --  RR: 18 (07 Nov 2018 10:56) (15 - 21)  SpO2: 100% (07 Nov 2018 10:56) (95% - 100%)    PHYSICAL EXAM:  Sitting in chair in nad  Neck supple  Lungs clear no wheeze no rales  Abd obese soft non tender  Right groin no hematoma no ecchymosis  Neuro alert and oriented        LABS:  CBC Full  -  ( 07 Nov 2018 06:33 )  WBC Count : 7.1 K/uL  Hemoglobin : 12.3 g/dL  Hematocrit : 38.5 %  Platelet Count - Automated : 149 K/uL  Mean Cell Volume : 87.5 fl  Mean Cell Hemoglobin : 28.0 pg  Mean Cell Hemoglobin Concentration : 31.9 g/dL    11-07    137  |  98  |  27.0<H>  ----------------------------<  309<H>  4.6   |  23.0  |  1.59<H>    Ca    9.2      07 Nov 2018 06:33  Phos  4.3     11-07  Mg     2.0     11-07    TPro  7.2  /  Alb  3.8  /  TBili  0.2<L>  /  DBili  x   /  AST  16  /  ALT  10  /  AlkPhos  104  11-05    CARDIAC MARKERS ( 06 Nov 2018 11:00 )  x     / <0.01 ng/mL / 94 U/L / x     / x      CARDIAC MARKERS ( 06 Nov 2018 04:40 )  x     / <0.01 ng/mL / 96 U/L / x     / x      CARDIAC MARKERS ( 05 Nov 2018 21:41 )  x     / <0.01 ng/mL / x     / x     / x

## 2018-11-07 NOTE — PROGRESS NOTE ADULT - SUBJECTIVE AND OBJECTIVE BOX
[facility]                                        Neu[facility] at [place]                                  Shreyas Mccoy, & Shyam                                      [address]                                           [place], [place], [address]                                                [phone]        CC: Slurred speech.    HISTORY:  The patient is a 68y Female who presented with chest pain. She was noted to have some slurring of her speech initially. This was mild to moderate in intensity. It has resolved and she reports her speech is back to normal. There was no associated motor or sensory change.  She has a prior history of stroke with some right sided difficulty. This largely improved but she has some residual facial asymmetry.   She ambulates without an assistive devise at baseline.  (JW 11/6)    Interval history:  no new complaints, mild rigth weakness    ROS neurology: Denies headache or dizziness. (+) right sided weakness.  Denies numbness.  Denies speech/language deficits. Denies diplopia/blurred vision.  Denies confusion    MEDICATIONS  (STANDING):  amLODIPine   Tablet 10 milliGRAM(s) Oral daily  aspirin  chewable 81 milliGRAM(s) Oral daily  atorvastatin 40 milliGRAM(s) Oral at bedtime  carvedilol 12.5 milliGRAM(s) Oral every 12 hours  dextrose 5%. 1000 milliLiter(s) (50 mL/Hr) IV Continuous <Continuous>  dextrose 50% Injectable 12.5 Gram(s) IV Push once  dextrose 50% Injectable 25 Gram(s) IV Push once  dextrose 50% Injectable 25 Gram(s) IV Push once  gabapentin 100 milliGRAM(s) Oral two times a day  heparin  Injectable 5000 Unit(s) SubCutaneous every 8 hours  insulin glargine Injectable (LANTUS) 20 Unit(s) SubCutaneous at bedtime  insulin lispro (HumaLOG) corrective regimen sliding scale   SubCutaneous three times a day before meals  insulin lispro Injectable (HumaLOG) 3 Unit(s) SubCutaneous three times a day before meals  levothyroxine 137 MICROGram(s) Oral daily  losartan 50 milliGRAM(s) Oral daily  pantoprazole    Tablet 40 milliGRAM(s) Oral two times a day    MEDICATIONS  (PRN):  acetaminophen   Tablet .. 650 milliGRAM(s) Oral every 6 hours PRN Mild Pain (1 - 3)  dextrose 40% Gel 15 Gram(s) Oral once PRN Blood Glucose LESS THAN 70 milliGRAM(s)/deciliter  glucagon  Injectable 1 milliGRAM(s) IntraMuscular once PRN Glucose LESS THAN 70 milligrams/deciliter  hydrALAZINE Injectable 10 milliGRAM(s) IV Push every 6 hours PRN SBP above 175      Vital Signs Last 24 Hrs  T(C): 36.6 (07 Nov 2018 10:56), Max: 37.2 (06 Nov 2018 19:30)  T(F): 97.9 (07 Nov 2018 10:56), Max: 99 (06 Nov 2018 21:36)  HR: 65 (07 Nov 2018 10:56) (57 - 72)  BP: 104/67 (07 Nov 2018 10:56) (80/50 - 187/92)  BP(mean): --  RR: 18 (07 Nov 2018 10:56) (15 - 21)  SpO2: 100% (07 Nov 2018 10:56) (95% - 100%)    Detailed neuro exam:    Cranial nerves:  Pupils react symmetrically to light. There is no visual field deficit to confrontation. Extraocular motion is full with no nystagmus. There is no ptosis. Facial sensation is intact. Facial musculature is asymmetric with a depression of the right nasolabial fold. Palate elevates symmetrically. Tongue is midline.    Motor: There is normal bulk and tone.  Strength is 5/5 in the right arm (except 4+/5 )  and 5/5 in right leg.   Strength is 5/5 in the left arm and leg.    Sensation: Decreased in the feet.    Reflexes: Trace throughout and plantar responses are flexor.    Cerebellar: There is no dysmetria on finger to nose testing.    LABS:                                    12.3   7.1   )-----------( 149      ( 07 Nov 2018 06:33 )             38.5     11-07    137  |  98  |  27.0<H>  ----------------------------<  309<H>  4.6   |  23.0  |  1.59<H>    Ca    9.2      07 Nov 2018 06:33  Phos  4.3     11-07  Mg     2.0     11-07    TPro  7.2  /  Alb  3.8  /  TBili  0.2<L>  /  DBili  x   /  AST  16  /  ALT  10  /  AlkPhos  104  11-05    LIVER FUNCTIONS - ( 05 Nov 2018 21:41 )  Alb: 3.8 g/dL / Pro: 7.2 g/dL / ALK PHOS: 104 U/L / ALT: 10 U/L / AST: 16 U/L / GGT: x           PT/INR - ( 05 Nov 2018 21:41 )   PT: 11.4 sec;   INR: 0.99 ratio         PTT - ( 05 Nov 2018 21:41 )  PTT:36.0 sec	    RADIOLOGY     Previous neurological studies:  CT head: There is no acute pathology.   Small hypodensity left Corona and thalamus consistent with old infarct.     < from: TTE Echo Complete w/Doppler (11.07.18 @ 10:18) >       Summary:   1. Technically difficult study.   2. Hyperdynamic global left ventricular systolic function.   3. Left ventricular ejection fraction, by visual estimation, is >75%.   4. Normal left ventricular internal cavity size.   5. Spectral Doppler shows impaired relaxation pattern of left   ventricular myocardial filling (Grade I diastolic dysfunction).   6. There is mild septal left ventricular hypertrophy.   7. Thickening and calcification of the posterior mitral valve leaflet.   8. Mild buckling of the anterior mitral valve leaflet.   9.Sclerotic aortic valve with decreased opening.  10. Peak transaortic gradient is 21.9 mmHg, mean transaortic gradient   equals 12.0 mmHg, the calculated aortic valve area equals 1.00 cm² by the   continuity equation consistent with moderate aortic stenosis. DVI= 0.32.  11. Trace tricuspid regurgitation.  12. Trace pulmonic valve regurgitation.  13. The main pulmonary artery is normal in size.  14. There is no evidence of pericardial effusion.

## 2018-11-07 NOTE — DISCHARGE NOTE ADULT - NS AS ACTIVITY OBS
No Heavy lifting/straining/Do not make important decisions/Walking-Outdoors allowed/Walking-Indoors allowed No Heavy lifting/straining/Walking-Outdoors allowed/as tolerated/Walking-Indoors allowed/Do not make important decisions

## 2018-11-07 NOTE — DISCHARGE NOTE ADULT - PROVIDER TOKENS
TOKEN:'[id number]:'[id number]' TOKEN:'[id number]:'[id number]:'[id number]:'[id number]'

## 2018-11-07 NOTE — PROGRESS NOTE ADULT - ASSESSMENT
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    Hypertensive Emergency-resolved   EKG: NSR w/ minor ST elevation in lead III, not seen on repeat EKG  s/p [facility] on 11/6/18 : patent grafts, cardio recommend d/c plavix   Trop neg x2  Reduce MAP slowly, 25% in first 4 hours then gradually thereafter  s/p Norvasc 5mg PO, Coreg 25mg PO, Hydralazine 10mg IV x2  Continue home meds, Lasix 40mg BID, Coreg at half dose (12.5mg BID), Norvasc 10mg QD  Hydralazine and HCTZ held  Cardio consult, [facility] - s/p cath with clean coronaries,  ppi  increased to bid and getting surgery involved for hernia intervention (likely to happen as OP)     New onset Dysarthria  resolved   Prior hx of CVA  CT Head: neg for acute ischemic or hemorrhagic infarct,   Outside of window for tPA  MRI Head pending; echo pending   Neuro consulted: possible TIA , agree w/ MRI, continue asa    MENEZES possible CHF  Gradually worsening, exercise tolerance down to 1/2 block  LE edema  Stress test from 05/2018: Post stress LVEF 76%  TTE pending    Diabetes Mellitus  Continue home meds, Lantus reduced to 15U, Premeal insulin 2U  Accuchecks premeal + QHS  Moderate insulin SS  HBA1c: 8.9%  B/l peripheral diabetic neuropathy, continue Gabapentin 100mg BID    Hiatal Hernia  Moderate in size as seen on CT Abd  Pain with ingestion of food  Continue Protonix 40mg QD  Surgery consult: recommending outpatient followup on 11/15 in his office.    CAD s/p CABG  Continue ASA and Plavix    Hypothyroidism  Continue home med, Synthroid 137mcg QD  TSH noted = 8.9  will d/w patient regarding when synthroid was last adjusted     Preventative Measure  Heparin 5000U Q8h 67 yo female with PMH of HTN, CAD, CABG (2013), HLD, Hypothyroidism, DMII on insulin, CVA (2004, 2013) w/ residual R lower facial droop, Breast Cancer s/p mastectomy 1984, glaucoma and blindness in Left eye presents with CP along with severely uncontrolled hypertension.    Hypertensive Emergency-resolved   EKG: NSR w/ minor ST elevation in lead III, not seen on repeat EKG  s/p [facility] on 11/6/18 : patent grafts, cardio recommend d/c plavix   Trop neg x2  Reduce MAP slowly, 25% in first 4 hours then gradually thereafter  s/p Norvasc 5mg PO, Coreg 25mg PO, Hydralazine 10mg IV x2  Continue home meds, Coreg at half dose (12.5mg BID), Norvasc 10mg QD  Hydralazine and HCTZ held  will d/c lasix and ARB for now until improvement of renal function   Cardio consult, [facility] - s/p cath with clean coronaries,  ppi  increased to bid and getting surgery involved for hernia intervention (likely to happen as OP)       SHAHNAZ  most likely 2/2 to contrast after LHC on 11/6/18  Cr= 1.59 BUN = 27, CO2= 23 ( no acidosis )   plasmalyte 1 lit   will d/c lasix and ARB for now   f/u cr and BUN in the am    New onset Dysarthria  resolved   Prior hx of CVA  CT Head: neg for acute ischemic or hemorrhagic infarct,   Outside of window for tPA  MRI Head pending;  Echo done: pending review   Neuro consulted: possible TIA , agree w/ MRI, continue asa    MENEZES possible CHF  Gradually worsening, exercise tolerance down to 1/2 block  LE edema resolved   Stress test from 05/2018: Post stress LVEF 76%  Echo done: pending review       Diabetes Mellitus  blood sugar not well controlled   fasting BS = 309 and pre-lunch = 454 tx w/10 units of short acting   will increase  Lantus from  15U to 20 unit and Premeal insulin from 2U to 3 units   Accuchecks premeal + QHS  Moderate insulin SS  HBA1c: 8.9%  B/l peripheral diabetic neuropathy, continue Gabapentin 100mg BID    Hiatal Hernia  Moderate in size as seen on CT Abd  Pain with ingestion of food  Continue Protonix 40mg BID  Surgery consult: recommending outpatient followup on 11/15 in his office.    CAD s/p CABG  Continue ASA   d/c plavix as recommend by cardiology     Hypothyroidism  Continue home med, Synthroid 137mcg QD  TSH noted = 8.9  will d/w patient regarding when synthroid was last adjusted     Preventative Measure  Heparin 5000U Q8h  PT: home w/o PT   MRI is pending , need to monitor renal function due to SHAHNAZ 69 yo female with PMH of HTN, CAD, CABG (2013), HLD, Hypothyroidism, DMII on insulin, CVA (2004, 2013) w/ residual R lower facial droop, Breast Cancer s/p mastectomy 1984, glaucoma and blindness in Left eye presents with CP along with severely uncontrolled hypertension.    Hypertensive Emergency-resolved   EKG: NSR w/ minor ST elevation in lead III, not seen on repeat EKG  s/p [facility] on 11/6/18 : patent grafts, cardio recommend d/c plavix   Trop neg x2  Reduce MAP slowly, 25% in first 4 hours then gradually thereafter  s/p Norvasc 5mg PO, Coreg 25mg PO, Hydralazine 10mg IV x2  Continue home meds, Coreg at half dose (12.5mg BID), Norvasc 10mg QD  Hydralazine and HCTZ held  will d/c lasix and ARB for now until improvement of renal function   Cardio consult, [facility] - s/p cath with clean coronaries,  ppi  increased to bid and getting surgery involved for hernia intervention (likely to happen as OP)       SHAHNAZ  most likely 2/2 to contrast after LHC on 11/6/18  Cr= 1.59 BUN = 27, CO2= 23 ( no acidosis )   plasmalyte 1 lit   will d/c lasix and ARB for now   f/u cr and BUN in the am    New onset Dysarthria  resolved   Prior hx of CVA  CT Head: neg for acute ischemic or hemorrhagic infarct,   Outside of window for tPA  MRI Head pending;  Echo done: pending review   Neuro consulted: possible TIA , agree w/ MRI, continue asa    MENEZES possible CHF  Gradually worsening, exercise tolerance down to 1/2 block  LE edema resolved   Stress test from 05/2018: Post stress LVEF 76%  Echo done: pending review       Diabetes Mellitus  blood sugar not well controlled   fasting BS = 309 and pre-lunch = 454 tx w/10 units of short acting   will increase  Lantus from  15U to 20 unit and Premeal insulin from 2U to 3 units   Accuchecks premeal + QHS  Moderate insulin SS  HBA1c: 8.9%  B/l peripheral diabetic neuropathy, continue Gabapentin 100mg BID    Hiatal Hernia  Moderate in size as seen on CT Abd  Pain with ingestion of food  Continue Protonix 40mg BID  Surgery consult: recommending outpatient followup on 11/15 in his office.    CAD s/p CABG  Continue ASA   d/c plavix as recommend by cardiology     Hypothyroidism  Continue home med, Synthroid 137mcg QD  TSH noted = 8.9  will d/w patient regarding when synthroid was last adjusted     Preventative Measure  Heparin 5000U Q8h    Disposition:  PT: home w/o PT  MRI is pending , need to monitor renal function due to SHAHNAZ 67 yo female with PMH of HTN, CAD, CABG (2013), HLD, Hypothyroidism, DMII on insulin, CVA (2004, 2013) w/ residual R lower facial droop, Breast Cancer s/p mastectomy 1984, glaucoma and blindness in Left eye presents with CP along with severely uncontrolled hypertension.    Hypertensive Emergency-resolved   EKG: NSR w/ minor ST elevation in lead III, not seen on repeat EKG  s/p [facility] on 11/6/18 : patent grafts, cardio recommend d/c plavix   Trop neg x2  Reduce MAP slowly, 25% in first 4 hours then gradually thereafter  s/p Norvasc 5mg PO, Coreg 25mg PO, Hydralazine 10mg IV x2  Continue home meds, Coreg at half dose (12.5mg BID), Norvasc 10mg QD  Hydralazine and HCTZ held  will d/c lasix and ARB for now until improvement of renal function   Cardio consult, [facility] - s/p cath with clean coronaries,  ppi  increased to bid and getting surgery involved for hernia intervention (likely to happen as OP)     SHAHNAZ  most likely 2/2 to contrast after ct scans on admission and LHC on 11/6/18  Cr= 1.59 BUN = 27, CO2= 23 ( no acidosis )   plasmalyte 1 lit   will d/c lasix and ARB for now   f/u cr and BUN in the am    New onset Dysarthria  resolved   Prior hx of CVA  CT Head: neg for acute ischemic or hemorrhagic infarct,   Outside of window for tPA  MRI Head pending;  Echo done: pending review   carotid pending   Neuro consulted: possible TIA , agree w/ MRI, continue asa    MENEZES possible CHF  Gradually worsening, exercise tolerance down to 1/2 block  LE edema resolved   Stress test from 05/2018: Post stress LVEF 76%  Echo done: pending review     Diabetes Mellitus  blood sugar not well controlled   fasting BS = 309 and pre-lunch = 454 tx w/10 units of short acting   will increase  Lantus from  15U to 20 unit and Premeal insulin from 2U to 3 units   Accuchecks premeal + QHS  Moderate insulin SS  HBA1c: 8.9%  B/l peripheral diabetic neuropathy, continue Gabapentin 100mg BID    Hiatal Hernia  Moderate in size as seen on CT Abd  Pain with ingestion of food  Continue Protonix 40mg BID  Surgery consult: recommending outpatient followup on 11/15 in his office.    CAD s/p CABG  Continue ASA   d/c plavix as recommend by cardiology     Hypothyroidism  Continue home med, Synthroid 137mcg QD  TSH noted = 8.9  will d/w patient regarding when synthroid was last adjusted     Preventative Measure  Heparin 5000U Q8h    Disposition:  PT: home w/o PT  MRI is pending , need to monitor renal function due to SHAHNAZ

## 2018-11-07 NOTE — DISCHARGE NOTE ADULT - HOSPITAL COURSE
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 67 yo female with PMH of HTN, CAD, CABG (2013), HLD, Hypothyroidism, DMII on insulin, CVA (2004, 2013) w/ residual R lower facial droop, Breast Cancer s/p mastectomy 1984, glaucoma and blindness in Left eye presents was admitted for Hypertensive Emergency with headache, chestpain and dysarthria. Patient had a negative cath on admission. Her MRI showed an acute tiny right cerebellum cva. Post contrast study, developed shahnaz with resolution in her Creatinine while here.     The HTN emergency was initally treated with Norvasc 5mg PO, Coreg 25mg PO, Hydralazine 10mg IV x2. She was continued on norvasc, coreg and her home dose of losartan. [facility] was consulted and a cath showed clean coronaries. Lasix was held secondary to SHAHNAZ likely due to contrast study. Creatinine returned to normal before discharge. The disarthria resolved. She ahs a prior history of CVAs. CT/MR imaging confirmed acute tiny right cerebellum cva, US carotid negative for stenosis, but shows incidental nodular thyroid. Echo shows Hiatal Hernia, LVEF > 75%, moderate aortic stenosis  neuro on board, aspirin was continued and statin increased. Her blood sugar weere not well controlled with HbA1c: 8.9% was 8.1 in 2015 and B/L peripheral diabetic neuropathy. She was treated with Lantus and HISS and continued her home Gabapentin. The Hiatal hernia is moderate in size as seen on CT Abd. She was treated with Protonix BID,and has an appt with Dr Perez outpatient on 11/15. Her CAD was treated with ASA, but the Plavix was DCed as requested by cardio. Her Hypothyroidism is treated with synthroid 137 at home and here, but her TSH was 8.9. She is not sure when her synthroid was last adjusted. She received Heparin for DVT prophylaxis. 69 yo female with PMH of HTN, CAD, CABG (2013), HLD, Hypothyroidism, DMII on insulin, CVA (2004, 2013) w/ residual R lower facial droop, Breast Cancer s/p mastectomy 1984, glaucoma and blindness in Left eye presents was admitted for Hypertensive Emergency with headache, chestpain and dysarthria. Patient had a negative cath on admission. Her MRI showed an acute tiny right cerebellum cva. Post contrast study, developed shahnaz with resolution in her Creatinine while here.     The HTN emergency was initally treated with Norvasc 5mg PO, Coreg 25mg PO, Hydralazine 10mg IV x2. She was continued on norvasc, coreg and her home dose of losartan. [facility] was consulted and a cath showed clean coronaries. Lasix was held secondary to SHAHNAZ likely due to contrast study. Creatinine returned to normal before discharge. The disarthria resolved. She ahs a prior history of CVAs. CT/MR imaging confirmed acute tiny right cerebellum cva, US carotid negative for stenosis, but shows incidental nodular thyroid. Echo shows Hiatal Hernia, LVEF > 75%, moderate aortic stenosis  neuro on board, aspirin was continued and statin increased. Her blood sugar weere not well controlled with HbA1c: 8.9% was 8.1 in 2015 and B/L peripheral diabetic neuropathy. She was treated with Lantus and HISS and continued her home Gabapentin. The Hiatal hernia is moderate in size as seen on CT Abd. She was treated with Protonix BID,and has an appt with Dr Perez outpatient on 11/15. Her CAD was treated with ASA, but the Plavix was DCed as requested by cardio. Her Hypothyroidism is treated with synthroid 137 at home and here, but her TSH was 8.9. She is not sure when her synthroid was last adjusted. She received Heparin for DVT prophylaxis. Patient was evaluated by both PT and OT who deemed the patient independent and not in need for any assistance or rehabilitation.    Patient is medically stable and optimized for discharge.  Time spent preparing discharge summary: 45 min. 69 yo female with PMH of HTN, CAD, CABG (2013), HLD, Hypothyroidism, DMII on insulin, CVA (2004, 2013) w/ residual R lower facial droop, Breast Cancer s/p mastectomy 1984, glaucoma and blindness in Left eye presents was admitted for Hypertensive Emergency with headache, chestpain and dysarthria. Patient had a negative cath on admission. Her MRI showed an acute tiny right cerebellum cva. Post contrast study, developed shahnaz with resolution in her Creatinine while here.     The HTN emergency was initally treated with Norvasc 5mg PO, Coreg 25mg PO, Hydralazine 10mg IV x2. She was continued on norvasc, coreg and her home dose of losartan. [facility] was consulted and a cath showed clean coronaries. Lasix was held secondary to SHAHNAZ likely due to contrast study. Creatinine returned to normal before discharge. The disarthria resolved. She ahs a prior history of CVAs. CT/MR imaging confirmed acute tiny right cerebellum cva, US carotid negative for stenosis, but shows incidental nodular thyroid. Echo shows Hiatal Hernia, LVEF > 75%, moderate aortic stenosis  neuro on board, aspirin was continued and statin increased. Her blood sugar weere not well controlled with HbA1c: 8.9% was 8.1 in 2015 and B/L peripheral diabetic neuropathy. She was treated with Lantus and HISS and continued her home Gabapentin. The Hiatal hernia is moderate in size as seen on CT Abd. She was treated with Protonix BID,and has an appt with Dr Perez outpatient on 11/15. Her CAD was treated with ASA, but the Plavix was DCed as requested by cardio. Her Hypothyroidism is treated with synthroid 137 at home and here, but her TSH was 8.9. She is not sure when her synthroid was last adjusted. She received Heparin for DVT prophylaxis. Patient was evaluated by both PT and OT who deemed the patient independent and not in need for any assistance or rehabilitation.    Vital Signs Last 24 Hrs  T(C): 36.9 (09 Nov 2018 10:22), Max: 36.9 (09 Nov 2018 05:07)  T(F): 98.5 (09 Nov 2018 10:22), Max: 98.5 (09 Nov 2018 05:07)  HR: 54 (09 Nov 2018 10:22) (54 - 89)  BP: 126/70 (09 Nov 2018 10:22) (120/66 - 161/75)  BP(mean): --  RR: 18 (09 Nov 2018 10:22) (15 - 18)  SpO2: 99% (09 Nov 2018 05:07) (98% - 99%)    Physical Exam:   GENERAL: NAD, well-groomed, well-developed, obese  HEENT: eomi, ncat  NERVOUS SYSTEM:  Alert & Oriented X3, Good concentration; Motor Strength 5/5 B/L upper and lower extremities, 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 Systolic murmur, Regular rate; rubs, or gallops  ABDOMEN: epigastric and ULQ tenderness to palpation, Soft, Nondistended; Bowel sounds present  EXTREMITIES:  2+ Peripheral Pulses, No clubbing, cyanosis, or edema  Skin: grossly intact    Patient is medically stable and optimized for discharge.  Time spent preparing discharge summary: 42 min.

## 2018-11-07 NOTE — PROGRESS NOTE ADULT - ASSESSMENT
The patient is a 68y Female with transient slurred speech in the setting of chest pain.     Possible TIA.   Await MRI brain.   echo as above  will add carotid duplex  Continue antiplatelet and statin.     Chest pain  Plan per medicine.     Diabetic peripheral neuropathy   Continue Gabapentin.      will follow with you    Samir Pritchett MD PhD   [id number]  Case discussed with Dr Mccall

## 2018-11-07 NOTE — DISCHARGE NOTE ADULT - PATIENT PORTAL LINK FT
You can access the [facility] Patient Portal, offered by [facility], by registering with the following website: [email or web address]

## 2018-11-07 NOTE — PROGRESS NOTE ADULT - PROBLEM SELECTOR PLAN 2
Post cath  b/p ok now  would hold lasix and arb today   consider renal consult Post cath  b/p ok now  would hold lasix and arb today   consider renal consult if no improvment  disussed with DR Paez  holding lasix and arb till improvement

## 2018-11-07 NOTE — PROGRESS NOTE ADULT - ASSESSMENT
67 y/o female with PMH of CAD, S/P CABG( LIMA to the D1 and LAD, SVG to the RI, sequential SVG to the OM and PDA), DM, HTN, HLD, stage 2 CKD, hypothyroid, and obesity c/o nonexertional chest pain and MENEZES Nonradiating chest pain. Presents to the ED with 8-10/10 left chest pain described as sharp to dull that increases with arm movement, breathing and palpitation, for the past 20 hours. Cathed and found to 67 y/o female with PMH of CAD, S/P CABG( LIMA to the D1 and LAD, SVG to the RI, sequential SVG to the OM and PDA), DM, HTN, HLD, stage 2 CKD, hypothyroid, and obesity c/o nonexertional chest pain and MENEZES Nonradiating chest pain. Presents to the ED with 8-10/10 left chest pain described as sharp to dull that increases with arm movement, breathing and palpitation, for the past 20 hours. Cathed and found to grafts open  (full report is pending) echo is pending.

## 2018-11-07 NOTE — DISCHARGE NOTE ADULT - OTHER SIGNIFICANT FINDINGS
< from: CT Abdomen and Pelvis No Cont (11.06.18 @ 23:09) >  IMPRESSION:   1. No inguinal, femoral or retroperitoneal hematoma.   2. Hiatal hernia. Cholelithiasis. Chest wall lipoma.    Images and report from [facility] were reviewed and approved with the following   additions:     No signs of bleeding. Scattered colonic diverticula in the left colon and   sigmoid.  < end of copied text >    < from: CT Angio Chest/Abdomen and Pelvis w/ IV Cont (11.06.18 @ 00:22) >  IMPRESSION: No aortic dissection.  < end of copied text >    < from: CT Head No Cont (11.06.18 @ 00:19) >  IMPRESSION:   1.  No CT evidence of acute intracranial hemorrhage, mass effect or acute   territorial infarct.  2.  Small patchy age indeterminate infarction left corona radiata and   left thalamus.  3.  Encephalomalacic cavity involving the left lentiform nucleus and   adjacent white matter may be sequela of prior hemorrhage or infarct.  4.  Follow-up MRI can be obtained for further characterization.  < end of copied text >    < from: MR Head No Cont (11.08.18 @ 11:18) >  IMPRESSION: Acute tiny right cerebellum infarct.  < end of copied text >    < from: US Duplex Carotid Arteries Complete, Bilateral (11.07.18 @ 22:00) >  Incidental nodular thyroid gland.    Impression:   No hemodynamically significant stenosis in the carotid or vertebral   arteries according to NASCET criteria.     Consider furtherevaluation of thyroid.  < end of copied text >    CBC Full  -  ( 08 Nov 2018 07:28 )  WBC Count : 7.4 K/uL  Hemoglobin : 12.3 g/dL  Hematocrit : 38.3 %  Platelet Count - Automated : 151 K/uL  Mean Cell Volume : 87.0 fl  Mean Cell Hemoglobin : 28.0 pg  Mean Cell Hemoglobin Concentration : 32.1 g/dL    11-08    142  |  102  |  33.0<H>  ----------------------------<  46<LL>  3.7   |  24.0  |  1.12    Ca    9.2      08 Nov 2018 07:28  Phos  4.0     11-08  Mg     2.2     11-08    Hemoglobin A1C, Whole Blood (11.06.18 @ 04:40)    Hemoglobin A1C, Whole Blood: 8.9 %    Lipid Profile (11.06.18 @ 04:40)    Total Cholesterol/HDL Ratio Measurement: 3.0 Ratio    Cholesterol, Serum: 186 mg/dL    Triglycerides, Serum: 45 mg/dL    HDL Cholesterol, Serum: 66: HDL Levels >/= 60 mg/dL are considered beneficial and a "negative" risk  factor.  Effective 08/15/2018: New reference range and interpretive comment. mg/dL    Direct LDL: 111: LDL Cholesterol --- Interpretive Comment (for adults 18 and over)

## 2018-11-07 NOTE — DISCHARGE NOTE ADULT - SECONDARY DIAGNOSIS.
Diabetes mellitus Hypercholesterolemia Hypothyroid Hiatal hernia Coronary artery disease involving coronary bypass graft of native heart with angina pectoris TIA (transient ischemic attack)

## 2018-11-07 NOTE — PROGRESS NOTE ADULT - PROBLEM SELECTOR PLAN 4
Dysarthria with hypertensive urgency  CT of brain is negative   for MRi of brain pending  echo pending  c/w asa

## 2018-11-07 NOTE — DISCHARGE NOTE ADULT - ADDITIONAL INSTRUCTIONS
-follow up with your cardiology Dr. Mandujano in one week  -follow up with your primary care doctor Dr. Serrano   -continue treatment with your home medication, don't stop any medication without talk to your doctor first

## 2018-11-08 LAB
ANION GAP SERPL CALC-SCNC: 16 MMOL/L — SIGNIFICANT CHANGE UP (ref 5–17)
BUN SERPL-MCNC: 33 MG/DL — HIGH (ref 8–20)
CALCIUM SERPL-MCNC: 9.2 MG/DL — SIGNIFICANT CHANGE UP (ref 8.6–10.2)
CHLORIDE SERPL-SCNC: 102 MMOL/L — SIGNIFICANT CHANGE UP (ref 98–107)
CK SERPL-CCNC: 90 U/L — SIGNIFICANT CHANGE UP (ref 25–170)
CO2 SERPL-SCNC: 24 MMOL/L — SIGNIFICANT CHANGE UP (ref 22–29)
CREAT SERPL-MCNC: 1.12 MG/DL — SIGNIFICANT CHANGE UP (ref 0.5–1.3)
GLUCOSE BLDC GLUCOMTR-MCNC: 149 MG/DL — HIGH (ref 70–99)
GLUCOSE BLDC GLUCOMTR-MCNC: 167 MG/DL — HIGH (ref 70–99)
GLUCOSE BLDC GLUCOMTR-MCNC: 281 MG/DL — HIGH (ref 70–99)
GLUCOSE BLDC GLUCOMTR-MCNC: 327 MG/DL — HIGH (ref 70–99)
GLUCOSE BLDC GLUCOMTR-MCNC: 349 MG/DL — HIGH (ref 70–99)
GLUCOSE SERPL-MCNC: 46 MG/DL — CRITICAL LOW (ref 70–115)
HCT VFR BLD CALC: 38.3 % — SIGNIFICANT CHANGE UP (ref 37–47)
HGB BLD-MCNC: 12.3 G/DL — SIGNIFICANT CHANGE UP (ref 12–16)
MAGNESIUM SERPL-MCNC: 2.2 MG/DL — SIGNIFICANT CHANGE UP (ref 1.8–2.6)
MCHC RBC-ENTMCNC: 28 PG — SIGNIFICANT CHANGE UP (ref 27–31)
MCHC RBC-ENTMCNC: 32.1 G/DL — SIGNIFICANT CHANGE UP (ref 32–36)
MCV RBC AUTO: 87 FL — SIGNIFICANT CHANGE UP (ref 81–99)
NT-PROBNP SERPL-SCNC: 118 PG/ML — SIGNIFICANT CHANGE UP (ref 0–300)
PHOSPHATE SERPL-MCNC: 4 MG/DL — SIGNIFICANT CHANGE UP (ref 2.4–4.7)
PLATELET # BLD AUTO: 151 K/UL — SIGNIFICANT CHANGE UP (ref 150–400)
POTASSIUM SERPL-MCNC: 3.7 MMOL/L — SIGNIFICANT CHANGE UP (ref 3.5–5.3)
POTASSIUM SERPL-SCNC: 3.7 MMOL/L — SIGNIFICANT CHANGE UP (ref 3.5–5.3)
RBC # BLD: 4.4 M/UL — SIGNIFICANT CHANGE UP (ref 4.4–5.2)
RBC # FLD: 14.8 % — SIGNIFICANT CHANGE UP (ref 11–15.6)
SODIUM SERPL-SCNC: 142 MMOL/L — SIGNIFICANT CHANGE UP (ref 135–145)
TROPONIN T SERPL-MCNC: <0.01 NG/ML — SIGNIFICANT CHANGE UP (ref 0–0.06)
TROPONIN T SERPL-MCNC: <0.01 NG/ML — SIGNIFICANT CHANGE UP (ref 0–0.06)
WBC # BLD: 7.4 K/UL — SIGNIFICANT CHANGE UP (ref 4.8–10.8)
WBC # FLD AUTO: 7.4 K/UL — SIGNIFICANT CHANGE UP (ref 4.8–10.8)

## 2018-11-08 PROCEDURE — 99232 SBSQ HOSP IP/OBS MODERATE 35: CPT

## 2018-11-08 PROCEDURE — 93010 ELECTROCARDIOGRAM REPORT: CPT

## 2018-11-08 PROCEDURE — 99233 SBSQ HOSP IP/OBS HIGH 50: CPT | Mod: GC

## 2018-11-08 PROCEDURE — 99233 SBSQ HOSP IP/OBS HIGH 50: CPT

## 2018-11-08 PROCEDURE — 70551 MRI BRAIN STEM W/O DYE: CPT | Mod: 26

## 2018-11-08 RX ORDER — INSULIN GLARGINE 100 [IU]/ML
20 INJECTION, SOLUTION SUBCUTANEOUS AT BEDTIME
Qty: 0 | Refills: 0 | Status: DISCONTINUED | OUTPATIENT
Start: 2018-11-08 | End: 2018-11-09

## 2018-11-08 RX ORDER — LEVOTHYROXINE SODIUM 125 MCG
1 TABLET ORAL
Qty: 30 | Refills: 0 | OUTPATIENT
Start: 2018-11-08 | End: 2018-12-07

## 2018-11-08 RX ORDER — CARVEDILOL PHOSPHATE 80 MG/1
1 CAPSULE, EXTENDED RELEASE ORAL
Qty: 0 | Refills: 0 | COMMUNITY
Start: 2018-11-08

## 2018-11-08 RX ORDER — LEVOTHYROXINE SODIUM 125 MCG
150 TABLET ORAL DAILY
Qty: 0 | Refills: 0 | Status: DISCONTINUED | OUTPATIENT
Start: 2018-11-08 | End: 2018-11-09

## 2018-11-08 RX ORDER — NITROGLYCERIN 6.5 MG
0.4 CAPSULE, EXTENDED RELEASE ORAL
Qty: 0 | Refills: 0 | Status: DISCONTINUED | OUTPATIENT
Start: 2018-11-08 | End: 2018-11-08

## 2018-11-08 RX ORDER — LOSARTAN/HYDROCHLOROTHIAZIDE 100MG-25MG
1 TABLET ORAL
Qty: 0 | Refills: 0 | COMMUNITY

## 2018-11-08 RX ORDER — FUROSEMIDE 40 MG
1 TABLET ORAL
Qty: 30 | Refills: 0 | OUTPATIENT
Start: 2018-11-08 | End: 2018-12-07

## 2018-11-08 RX ORDER — CALCIUM CARBONATE 500(1250)
1 TABLET ORAL ONCE
Qty: 0 | Refills: 0 | Status: COMPLETED | OUTPATIENT
Start: 2018-11-08 | End: 2018-11-08

## 2018-11-08 RX ORDER — ATORVASTATIN CALCIUM 80 MG/1
80 TABLET, FILM COATED ORAL AT BEDTIME
Qty: 0 | Refills: 0 | Status: DISCONTINUED | OUTPATIENT
Start: 2018-11-08 | End: 2018-11-09

## 2018-11-08 RX ORDER — AMLODIPINE BESYLATE 2.5 MG/1
1 TABLET ORAL
Qty: 30 | Refills: 0 | OUTPATIENT
Start: 2018-11-08 | End: 2018-12-07

## 2018-11-08 RX ORDER — PANTOPRAZOLE SODIUM 20 MG/1
1 TABLET, DELAYED RELEASE ORAL
Qty: 60 | Refills: 0 | OUTPATIENT
Start: 2018-11-08 | End: 2018-12-07

## 2018-11-08 RX ORDER — LOSARTAN POTASSIUM 100 MG/1
1 TABLET, FILM COATED ORAL
Qty: 30 | Refills: 0 | OUTPATIENT
Start: 2018-11-08 | End: 2018-12-07

## 2018-11-08 RX ORDER — FUROSEMIDE 40 MG
1 TABLET ORAL
Qty: 0 | Refills: 0 | COMMUNITY

## 2018-11-08 RX ORDER — INSULIN LISPRO 100/ML
6 VIAL (ML) SUBCUTANEOUS
Qty: 0 | Refills: 0 | Status: DISCONTINUED | OUTPATIENT
Start: 2018-11-08 | End: 2018-11-09

## 2018-11-08 RX ORDER — MORPHINE SULFATE 50 MG/1
1 CAPSULE, EXTENDED RELEASE ORAL ONCE
Qty: 0 | Refills: 0 | Status: DISCONTINUED | OUTPATIENT
Start: 2018-11-08 | End: 2018-11-08

## 2018-11-08 RX ADMIN — ATORVASTATIN CALCIUM 80 MILLIGRAM(S): 80 TABLET, FILM COATED ORAL at 22:08

## 2018-11-08 RX ADMIN — Medication 30 MILLILITER(S): at 16:48

## 2018-11-08 RX ADMIN — PANTOPRAZOLE SODIUM 40 MILLIGRAM(S): 20 TABLET, DELAYED RELEASE ORAL at 17:30

## 2018-11-08 RX ADMIN — GABAPENTIN 100 MILLIGRAM(S): 400 CAPSULE ORAL at 17:30

## 2018-11-08 RX ADMIN — Medication 8: at 12:13

## 2018-11-08 RX ADMIN — LOSARTAN POTASSIUM 50 MILLIGRAM(S): 100 TABLET, FILM COATED ORAL at 06:09

## 2018-11-08 RX ADMIN — Medication 1 TABLET(S): at 12:12

## 2018-11-08 RX ADMIN — MORPHINE SULFATE 1 MILLIGRAM(S): 50 CAPSULE, EXTENDED RELEASE ORAL at 07:55

## 2018-11-08 RX ADMIN — HEPARIN SODIUM 5000 UNIT(S): 5000 INJECTION INTRAVENOUS; SUBCUTANEOUS at 22:08

## 2018-11-08 RX ADMIN — Medication 137 MICROGRAM(S): at 06:09

## 2018-11-08 RX ADMIN — CARVEDILOL PHOSPHATE 12.5 MILLIGRAM(S): 80 CAPSULE, EXTENDED RELEASE ORAL at 17:30

## 2018-11-08 RX ADMIN — GABAPENTIN 100 MILLIGRAM(S): 400 CAPSULE ORAL at 06:09

## 2018-11-08 RX ADMIN — PANTOPRAZOLE SODIUM 40 MILLIGRAM(S): 20 TABLET, DELAYED RELEASE ORAL at 06:09

## 2018-11-08 RX ADMIN — INSULIN GLARGINE 20 UNIT(S): 100 INJECTION, SOLUTION SUBCUTANEOUS at 22:07

## 2018-11-08 RX ADMIN — Medication 3 UNIT(S): at 09:11

## 2018-11-08 RX ADMIN — AMLODIPINE BESYLATE 10 MILLIGRAM(S): 2.5 TABLET ORAL at 06:08

## 2018-11-08 RX ADMIN — Medication 3 UNIT(S): at 17:30

## 2018-11-08 RX ADMIN — Medication 650 MILLIGRAM(S): at 22:08

## 2018-11-08 RX ADMIN — Medication 3 UNIT(S): at 12:13

## 2018-11-08 RX ADMIN — Medication 8: at 17:30

## 2018-11-08 RX ADMIN — HEPARIN SODIUM 5000 UNIT(S): 5000 INJECTION INTRAVENOUS; SUBCUTANEOUS at 06:09

## 2018-11-08 RX ADMIN — HEPARIN SODIUM 5000 UNIT(S): 5000 INJECTION INTRAVENOUS; SUBCUTANEOUS at 14:51

## 2018-11-08 RX ADMIN — MORPHINE SULFATE 1 MILLIGRAM(S): 50 CAPSULE, EXTENDED RELEASE ORAL at 08:25

## 2018-11-08 RX ADMIN — Medication 650 MILLIGRAM(S): at 22:38

## 2018-11-08 RX ADMIN — Medication 81 MILLIGRAM(S): at 12:12

## 2018-11-08 NOTE — PROGRESS NOTE ADULT - SUBJECTIVE AND OBJECTIVE BOX
CARDIOLOGY PROGRESS NOTE   ([facility])                                                                                                        Subjective: denied dyspnea, CP, palpitation alert, awake, NAD    Vitals:  T(C): 36.7 (18 @ 07:54), Max: 36.7 (18 @ 22:10)  HR: 67 (18 @ 07:54) (54 - 67)  BP: 118/64 (18 @ 07:54) (100/72 - 138/75)  RR: 16 (18 @ 07:54) (16 - 18)  SpO2: 97% (18 @ 07:54) (97% - 100%)  Wt(kg): --  I&O's Summary    2018 07:01  -  2018 07:00  --------------------------------------------------------  IN: 1360 mL / OUT: 500 mL / NET: 860 mL          PHYSICAL EXAM:  Appearance: Normal	  HEENT:   Atraumatic  Cardiovascular: Normal S1 S2, No JVD, No murmurs, No edema  Respiratory: Lungs clear to auscultation	  Gastrointestinal:  Soft, Non-tender, + BS	  Skin: No rashes, No ecchymoses, No cyanosis  Neurologic: Alert and awake, able to move extremities  Extremities: No edema    TELEMETRY:  SR	          DIAGNOSTIC TESTING:      CURRENT MEDICATIONS:  amLODIPine   Tablet 10 milliGRAM(s) Oral daily  carvedilol 12.5 milliGRAM(s) Oral every 12 hours  hydrALAZINE Injectable 10 milliGRAM(s) IV Push every 6 hours PRN  losartan 50 milliGRAM(s) Oral daily  nitroglycerin     SubLingual 0.4 milliGRAM(s) SubLingual every 5 minutes PRN        acetaminophen   Tablet .. 650 milliGRAM(s) Oral every 6 hours PRN  gabapentin 100 milliGRAM(s) Oral two times a day    aluminum hydroxide/magnesium hydroxide/simethicone Suspension 30 milliLiter(s) Oral every 6 hours PRN  calcium carbonate    500 mG (Tums) Chewable 1 Tablet(s) Chew once  pantoprazole    Tablet 40 milliGRAM(s) Oral two times a day    atorvastatin 40 milliGRAM(s) Oral at bedtime  dextrose 40% Gel 15 Gram(s) Oral once PRN  dextrose 50% Injectable 12.5 Gram(s) IV Push once  dextrose 50% Injectable 25 Gram(s) IV Push once  dextrose 50% Injectable 25 Gram(s) IV Push once  glucagon  Injectable 1 milliGRAM(s) IntraMuscular once PRN  insulin glargine Injectable (LANTUS) 20 Unit(s) SubCutaneous at bedtime  insulin lispro (HumaLOG) corrective regimen sliding scale   SubCutaneous three times a day before meals  insulin lispro Injectable (HumaLOG) 3 Unit(s) SubCutaneous three times a day before meals  levothyroxine 137 MICROGram(s) Oral daily    aspirin  chewable 81 milliGRAM(s) Oral daily  dextrose 5%. 1000 milliLiter(s) IV Continuous <Continuous>  heparin  Injectable 5000 Unit(s) SubCutaneous every 8 hours      LABS:	 	                              12.3   7.4   )-----------( 151      ( 2018 07:28 )             38.3         142  |  102  |  33.0<H>  ----------------------------<  46<LL>  3.7   |  24.0  |  1.12    Ca    9.2      2018 07:28  Phos  4.0       Mg     2.2           proBNP: Serum Pro-Brain Natriuretic Peptide: 118 pg/mL ( @ 09:13)    Lipid Profile: Date:  @ 04:40  Total cholesterol 186; Direct LDL: 111; HDL: 66; Triglycerides:45    HgA1c:   TSH: Thyroid Stimulating Hormone, Serum: 8.92 uIU/mL ( @ 04:40)        EXAM:  ECHO TRANSTHORACIC COMP W DOPP      PROCEDURE DATE:  2018   .      INTERPRETATION:  REPORT:    TRANSTHORACIC ECHOCARDIOGRAM REPORT         Patient Name:   SERGIO PHILLIP Patient Location: Inpatient  Medical Rec #:  [id number]             Accession #:      [id number]  Account #:                           Height:           61.8 in 157.0 cm  YOB: 1950            Weight:           205.0 lb 93.00 kg  Patient Age:    68 years             BSA:              1.93 m²  Patient Gender: F                    BP:               133/69 mmHg       Date of Exam:        2018 10:18:08 AM  Sonographer:         Bruna Patel  Referring Physician: Erickson Briseno MD    Procedure:     2D Echo/Doppler/Color Doppler Complete.  Indications:   Chest pain, unspecified - R07.9  Diagnosis:     Chest pain, unspecified - R07.9  Study Details: Technically difficult study. Study quality was adversely   affected                 due to body habitus and patient obesity.         2D AND M-MODE MEASUREMENTS (normal ranges within parentheses):  Left                 Normal   Aorta/Left            Normal  Ventricle:                    Atrium:  IVSd (2D):    1.23  (0.7-1.1) Aortic Root  2.82 cm (2.4-3.7)                 cm             (2D):  LVPWd (2D):   0.92  (0.7-1.1) Left Atrium  3.60 cm (1.9-4.0)                 cm             (2D):  LVIDd (2D):   3.64  (3.4-5.7) LA Volume     28.8                 cm             Index         ml/m²  LVIDs (2D):   2.11                 cm  LV FS (2D):   42.0   (>25%)                  %  Relative Wall 0.50   (<0.42)  Thickness    LV DIASTOLIC FUNCTION:  MV Peak E: 0.79 m/s e', MV Lauren: 0.04 m/s  MV Peak A: 1.15 m/s E/e' Ratio: 18.14  E/A Ratio: 0.69    SPECTRAL DOPPLER ANALYSIS (where applicable):  Aortic Valve: AoV Max Willis: 2.34 m/s AoV Peak P.9 mmHg AoV Mean P.0 mmHg    LVOT Vmax: 0.81 m/s LVOT VTI: 0.177 m LVOT Diameter: 1.97 cm    AoV Area, Vmax: 1.05 cm² AoV Area, VTI: 1.00 cm² AoV Area, Vmn: 1.15 cm²  Ao VTI: 0.542  Tricuspid Valve and PA/RV Systolic Pressure: TR Max Velocity:  RA   Pressure: 3 mmHg RVSP/PASP:       PHYSICIAN INTERPRETATION:  Left Ventricle: The left ventricular internal cavity size is normal.  Global LV systolic function was hyperdynamic. Left ventricular ejection   fraction, by visual estimation, is >75%. Spectral Doppler shows impaired   relaxation pattern of left ventricular myocardial filling (Grade I   diastolic dysfunction).  Right Ventricle: The right ventricular size is normal. RV systolic   function is normal.  Left Atrium: Normal left atrial size.  Right Atrium: Normal right atrial size.  Pericardium: There is no evidence of pericardial effusion.  Mitral Valve: Thickening and calcification of the posterior mitral valve   leaflet. There is mild mitral annular calcification. Trace mitral valve   regurgitation is seen. Mild buckling of the anterior mitral valve leaflet.  Tricuspid Valve: Trivial tricuspid regurgitation is visualized. Adequate   TR velocity was not obtained to accurately assess RVSP.  Aortic Valve: The aortic valve is trileaflet. Sclerotic aortic valve with   decreased opening. Peak Av velocity is 2.34 m/s, mean transaortic   gradient equals 12.0 mmHg, the calculated aortic valve area equals 1.00   cm² by the continuity equation consistent with moderate aortic stenosis.   The peak aortic velocity was obtained from the apical view. No evidence   of aortic valve regurgitation is seen. DOI= .34.  Pulmonic Valve: Trace pulmonic valve regurgitation.  Aorta: The aortic root is normal in size and structure.  Pulmonary Artery: The main pulmonary artery is normal in size.  Venous: The inferior vena cava was normal sized, with respiratory size   variation greater than 50%.       Summary:   1. Technically difficult study.   2. Hyperdynamic global left ventricular systolic function.   3. Left ventricular ejection fraction, by visual estimation, is >75%.   4. Normal left ventricular internal cavity size.   5. Spectral Doppler shows impaired relaxation pattern of left   ventricular myocardial filling (Grade I diastolic dysfunction).   6. There is mild septal left ventricular hypertrophy.   7. Thickening and calcification of the posterior mitral valve leaflet.   8. Mild buckling of the anterior mitral valve leaflet.   9. Sclerotic aortic valve with decreased opening.  10. Peak transaortic gradient is 21.9 mmHg, mean transaortic gradient   equals 12.0 mmHg, the calculated aortic valve area equals 1.00 cm² by the   continuity equation consistent with moderate aortic stenosis. DVI= 0.32.  11. Trace tricuspid regurgitation.  12. Trace pulmonic valve regurgitation.  13. The main pulmonary artery is normal in size.  14. There is no evidence of pericardial effusion.    [id number] Keegan Calderon MD, Electronically signed on 2018 at 12:29:31   PM

## 2018-11-08 NOTE — PROGRESS NOTE ADULT - SUBJECTIVE AND OBJECTIVE BOX
68y Female found sitting up in NAD, states "Im okay". Denies SOB, CP, N/V/D    Vital Signs:  Vital Signs Last 24 Hrs  T(C): 36.7 (11-08-18 @ 07:54), Max: 36.7 (11-07-18 @ 22:10)  T(F): 98 (11-08-18 @ 07:54), Max: 98.1 (11-07-18 @ 22:10)  HR: 67 (11-08-18 @ 07:54) (54 - 67)  BP: 118/64 (11-08-18 @ 07:54) (100/72 - 138/75)  RR: 16 (11-08-18 @ 07:54) (16 - 18)  SpO2: 97% (11-08-18 @ 07:54) (97% - 100%) on (O2)      11-07 @ 07:01  -  11-08 @ 07:00  --------------------------------------------------------  IN:    Lactated Ringers IV Bolus: 1000 mL    Oral Fluid: 360 mL  Total IN: 1360 mL    OUT:    Voided: 500 mL  Total OUT: 500 mL    Total NET: 860 mL      Relevant labs, Radiology and Medications reviewed    CXR: < from: Xray Chest 1 View AP/PA. (11.05.18 @ 21:55) >  FINDINGS:    The lungs  are clear.  No pleural abnormality is seen.         Heart size within normal limits allowing for magnification effect of AP   projection. Status post coronary artery bypassgraft procedure.   Visualized osseous structures are intact.    IMPRESSION:   No evidence of active chest disease.          < end of copied text >      Pertinent Physical Exam  Neuro: A+O x 3, non-focal, speech clear and intact  HEENT: PERRL, EOMI, oral mucosa pink and moist  CV: regular rate, regular rhythm, +S1S2, no murmurs or rub  Pulm/chest: lung sounds CTA and equal bilaterally, no accessory muscle use noted  Abd: soft, NT, ND, +BS  Skin: warm, well perfused, no rashes

## 2018-11-08 NOTE — PROGRESS NOTE ADULT - SUBJECTIVE AND OBJECTIVE BOX
[facility]                                        [facility] [place]                                  Shreyas Mccoy, & Shyam                                      [address]                                           [place], [place], [address]                                                [phone]        CC: Slurred speech.    HISTORY:  The patient is a 68y Female who presented with chest pain. She was noted to have some slurring of her speech initially. This was mild to moderate in intensity. It has resolved and she reports her speech is back to normal. There was no associated motor or sensory change.  She has a prior history of stroke with some right sided difficulty. This largely improved but she has some residual facial asymmetry.   She ambulates without an assistive devise at baseline.  ( 11/6)    Interval history:  no new complaints, c/o pain in chest    ROS neurology: Denies headache or dizziness. (+) right sided weakness.  Denies numbness.  Denies speech/language deficits. Denies diplopia/blurred vision.  Denies confusion    MEDICATIONS  (STANDING):  amLODIPine   Tablet 10 milliGRAM(s) Oral daily  aspirin  chewable 81 milliGRAM(s) Oral daily  atorvastatin 80 milliGRAM(s) Oral at bedtime  carvedilol 12.5 milliGRAM(s) Oral every 12 hours  dextrose 5%. 1000 milliLiter(s) (50 mL/Hr) IV Continuous <Continuous>  dextrose 50% Injectable 12.5 Gram(s) IV Push once  dextrose 50% Injectable 25 Gram(s) IV Push once  dextrose 50% Injectable 25 Gram(s) IV Push once  gabapentin 100 milliGRAM(s) Oral two times a day  heparin  Injectable 5000 Unit(s) SubCutaneous every 8 hours  insulin glargine Injectable (LANTUS) 20 Unit(s) SubCutaneous at bedtime  insulin lispro (HumaLOG) corrective regimen sliding scale   SubCutaneous three times a day before meals  insulin lispro Injectable (HumaLOG) 3 Unit(s) SubCutaneous three times a day before meals  levothyroxine 137 MICROGram(s) Oral daily  losartan 50 milliGRAM(s) Oral daily  pantoprazole    Tablet 40 milliGRAM(s) Oral two times a day    MEDICATIONS  (PRN):  acetaminophen   Tablet .. 650 milliGRAM(s) Oral every 6 hours PRN Mild Pain (1 - 3)  aluminum hydroxide/magnesium hydroxide/simethicone Suspension 30 milliLiter(s) Oral every 6 hours PRN Dyspepsia  dextrose 40% Gel 15 Gram(s) Oral once PRN Blood Glucose LESS THAN 70 milliGRAM(s)/deciliter  glucagon  Injectable 1 milliGRAM(s) IntraMuscular once PRN Glucose LESS THAN 70 milligrams/deciliter  hydrALAZINE Injectable 10 milliGRAM(s) IV Push every 6 hours PRN SBP above 175  nitroglycerin     SubLingual 0.4 milliGRAM(s) SubLingual every 5 minutes PRN Chest Pain      Vital Signs Last 24 Hrs  T(C): 36.7 (08 Nov 2018 15:52), Max: 37 (08 Nov 2018 10:30)  T(F): 98.1 (08 Nov 2018 15:52), Max: 98.6 (08 Nov 2018 10:30)  HR: 70 (08 Nov 2018 15:52) (54 - 80)  BP: 120/66 (08 Nov 2018 15:52) (118/64 - 138/75)  BP(mean): --  RR: 17 (08 Nov 2018 15:52) (16 - 17)  SpO2: 97% (08 Nov 2018 07:54) (97% - 98%)    Detailed neuro exam:    Cranial nerves:  Pupils react symmetrically to light. There is no visual field deficit to confrontation. Extraocular motion is full with no nystagmus. There is no ptosis. Facial sensation is intact. Facial musculature is asymmetric with a depression of the right nasolabial fold. Palate elevates symmetrically. Tongue is midline.    Motor: There is normal bulk and tone.  Strength is 5/5 in the right arm (except 4+/5 )  and 5/5 in right leg.   Strength is 5/5 in the left arm and leg.    Sensation: Decreased in the feet.    Reflexes: Trace throughout and plantar responses are flexor.    Cerebellar: There is no dysmetria on finger to nose testing.    LABS:                                    12.3   7.4   )-----------( 151      ( 08 Nov 2018 07:28 )             38.3     11-08    142  |  102  |  33.0<H>  ----------------------------<  46<LL>  3.7   |  24.0  |  1.12    Ca    9.2      08 Nov 2018 07:28  Phos  4.0     11-08  Mg     2.2     11-08            RADIOLOGY   Recent neurological studies (images reviewed independently):  MRI brain- tiny area of restricted diffusion in right cerebellum c/w acute CVA, no mass, no blood, (+) SVID    Previous neurological studies:  CT head: There is no acute pathology.   Small hypodensity left Corona and thalamus consistent with old infarct.     < from: TTE Echo Complete w/Doppler (11.07.18 @ 10:18) >       Summary:   1. Technically difficult study.   2. Hyperdynamic global left ventricular systolic function.   3. Left ventricular ejection fraction, by visual estimation, is >75%.   4. Normal left ventricular internal cavity size.   5. Spectral Doppler shows impaired relaxation pattern of left   ventricular myocardial filling (Grade I diastolic dysfunction).   6. There is mild septal left ventricular hypertrophy.   7. Thickening and calcification of the posterior mitral valve leaflet.   8. Mild buckling of the anterior mitral valve leaflet.   9.Sclerotic aortic valve with decreased opening.  10. Peak transaortic gradient is 21.9 mmHg, mean transaortic gradient   equals 12.0 mmHg, the calculated aortic valve area equals 1.00 cm² by the   continuity equation consistent with moderate aortic stenosis. DVI= 0.32.  11. Trace tricuspid regurgitation.  12. Trace pulmonic valve regurgitation.  13. The main pulmonary artery is normal in size.  14. There is no evidence of pericardial effusion.      < from: US Duplex Carotid Arteries Complete, Bilateral (11.07.18 @ 22:00) >  Impression:   No hemodynamically significant stenosis in the carotid or vertebral   arteries according to NASCET criteria.     Consider furtherevaluation of thyroid.

## 2018-11-08 NOTE — PROGRESS NOTE ADULT - PROBLEM SELECTOR PLAN 1
Continue care per primary team   Recommend outpatient follow up on 11/15 with Dr. Perez at his office    Plan discussed with Dr. Perez
Dc Plavix  continue with asa  atorvastatin 40 milliGRAM(s) Oral at bedtime  carvedilol 12.5 milliGRAM(s) Oral every 12 hours  Low saturated fat diet discussed

## 2018-11-08 NOTE — PROGRESS NOTE ADULT - SUBJECTIVE AND OBJECTIVE BOX
Patient is a 68y old  Female who presents with a chief complaint of Chest pain (2018 14:19)    INTERVAL HPI/OVERNIGHT EVENTS:  69yo Female seen at bedside and chart reviewed. Pt notes new onset CP substernal 7/10 comes and goes, sharp. Pt says chest pain remains has improved and abdominal pain has improved. Pt is eating, drinking urinating and moving bowels.  Pt denies cough, palpitations, Headache, lightheadedness, dizziness, nausea, vomiting, diarrhea, constipation, dysuria, lower extremity edema    Allergies    No Known Allergies    Intolerances    Vital Signs Last 24 Hrs  T(C): 36.6 (2018 06:03), Max: 36.7 (2018 22:10)  T(F): 97.8 (2018 06:03), Max: 98.1 (2018 22:10)  HR: 60 (2018 06:03) (54 - 65)  BP: 124/70 (2018 06:03) (100/72 - 138/75)  RR: 16 (2018 06:03) (16 - 18)  SpO2: 98% (2018 06:03) (97% - 100%)      Daily     Daily Weight in k.3 (2018 05:00) Gained 3.3kg  Daily  Daily Weight in k.0 (2018 05:41)    I&O's Detail  2018 07:01  -  2018 07:00  --------------------------------------------------------  IN:    Lactated Ringers IV Bolus: 1000 mL    Oral Fluid: 360 mL  Total IN: 1360 mL    OUT:    Voided: 500 mL  Total OUT: 500 mL    Total NET: 860 mL    PHYSICAL EXAM:  GENERAL: NAD, well-groomed, well-developed, obese  EYES: EOMI, PERRLA, conjunctiva and sclera clear  ENMT: No tonsillar erythema, exudates, or enlargement; Moist mucous membranes, Good dentition, No lesions  NECK: Supple, No JVD, Normal thyroid  NERVOUS SYSTEM:  Alert & Oriented X3, Good concentration; Motor Strength 5/5 B/L upper and lower extremities, 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 Systolic murmur, Regular rate; rubs, or gallops  ABDOMEN: epigastric and ULQ tenderness to palpation, Soft, Nondistended; Bowel sounds present  EXTREMITIES:  2+ Peripheral Pulses, No clubbing, cyanosis, or edema  LYMPH: No lymphadenopathy noted  PSYCH: Normal mood and affect  SKIN: No rashes or lesions    LABS:             12.3   7.1   )-----------( 149      ( 2018 06:33 )             38.5     CBC Full  -  ( 2018 06:33 )  WBC Count : 7.1 K/uL  Hemoglobin : 12.3 g/dL  Hematocrit : 38.5 %  Platelet Count - Automated : 149 K/uL  Mean Cell Volume : 87.5 fl  Mean Cell Hemoglobin : 28.0 pg  Mean Cell Hemoglobin Concentration : 31.9 g/dL      137  |  98  |  27.0<H>  ----------------------------<  309<H>  4.6   |  23.0  |  1.59<H>    Ca    9.2      2018 06:33  Phos  4.3       Mg     2.0         Hemoglobin A1C, Whole Blood: 8.9 % ( @ 04:40)      CARDIOVASCULAR:  amLODIPine   Tablet 10 milliGRAM(s) Oral daily  carvedilol 12.5 milliGRAM(s) Oral every 12 hours  hydrALAZINE Injectable 10 milliGRAM(s) IV Push every 6 hours PRN  losartan 50 milliGRAM(s) Oral daily    NEUROLOGIC:  acetaminophen   Tablet .. 650 milliGRAM(s) Oral every 6 hours PRN  gabapentin 100 milliGRAM(s) Oral two times a day    HEMATOLOGIC:  aspirin  chewable 81 milliGRAM(s) Oral daily  heparin  Injectable 5000 Unit(s) SubCutaneous every 8 hours    GATROINTESTINAL:  aluminum hydroxide/magnesium hydroxide/simethicone Suspension 30 milliLiter(s) Oral every 6 hours PRN  pantoprazole    Tablet 40 milliGRAM(s) Oral two times a day    ENDO/METABOLIC:  atorvastatin 40 milliGRAM(s) Oral at bedtime  dextrose 40% Gel 15 Gram(s) Oral once PRN  dextrose 50% Injectable 12.5 Gram(s) IV Push once  dextrose 50% Injectable 25 Gram(s) IV Push once  dextrose 50% Injectable 25 Gram(s) IV Push once  glucagon  Injectable 1 milliGRAM(s) IntraMuscular once PRN  insulin glargine Injectable (LANTUS) 20 Unit(s) SubCutaneous at bedtime  insulin lispro (HumaLOG) corrective regimen sliding scale   SubCutaneous three times a day before meals  insulin lispro Injectable (HumaLOG) 3 Unit(s) SubCutaneous three times a day before meals  levothyroxine 137 MICROGram(s) Oral daily    IV FLUID/NUTRITION:  dextrose 5%. 1000 milliLiter(s) IV Continuous <Continuous>  sodium chloride 0.9%. 1000 milliLiter(s) IV Continuous <Continuous>      RADIOLOGY & ADDITIONAL TESTS:    < from: CT Abdomen and Pelvis No Cont (18 @ 23:09) >  IMPRESSION:   1. No inguinal, femoral or retroperitoneal hematoma.   2. Hiatal hernia. Cholelithiasis. Chest wall lipoma.    Images and report from [facility] were reviewed and approved with the following   additions:     No signs of bleeding. Scattered colonic diverticula in the left colon and   sigmoid.  < end of copied text >    < from: CT Angio Chest and Abdomen and Pelvis w/ IV Cont (18 @ 00:22) >  IMPRESSION: No aortic dissection.  < end of copied text >    < from: CT Head No Cont (18 @ 00:19) >  IMPRESSION:   1.  No CT evidence of acute intracranial hemorrhage, mass effect or acute   territorial infarct.  2.  Small patchy age indeterminate infarction left corona radiata and   left thalamus.  3.  Encephalomalacic cavity involving the left lentiform nucleus and   adjacent white matter may be sequela of prior hemorrhage or infarct.  4.  Follow-up MRI can be obtained for further characterization.  < end of copied text > Patient is a 68y old  Female who presents with a chief complaint of Chest pain (2018 14:19)    INTERVAL HPI/OVERNIGHT EVENTS:  67yo Female seen at bedside and chart reviewed. Pt notes new onset CP substernal 7/10 comes and goes, sharp. Pt says chest pain remains has improved and abdominal pain has improved. Pt is eating, drinking urinating and moving bowels.  Pt denies cough, palpitations, Headache, lightheadedness, dizziness, nausea, vomiting, diarrhea, constipation, dysuria, lower extremity edema    MONITOR: Sinus w/PVC and missed beat    Allergies    No Known Allergies    Intolerances    Vital Signs Last 24 Hrs  T(C): 36.6 (2018 06:03), Max: 36.7 (2018 22:10)  T(F): 97.8 (2018 06:03), Max: 98.1 (2018 22:10)  HR: 60 (2018 06:03) (54 - 65)  BP: 124/70 (2018 06:03) (100/72 - 138/75)  RR: 16 (2018 06:03) (16 - 18)  SpO2: 98% (2018 06:03) (97% - 100%)      Daily     Daily Weight in k.3 (2018 05:00) Gained 3.3kg  Daily  Daily Weight in k.0 (2018 05:41)    I&O's Detail  2018 07:01  -  2018 07:00  --------------------------------------------------------  IN:    Lactated Ringers IV Bolus: 1000 mL    Oral Fluid: 360 mL  Total IN: 1360 mL    OUT:    Voided: 500 mL  Total OUT: 500 mL    Total NET: 860 mL    PHYSICAL EXAM:  GENERAL: NAD, well-groomed, well-developed, obese  EYES: EOMI, PERRLA, conjunctiva and sclera clear  ENMT: No tonsillar erythema, exudates, or enlargement; Moist mucous membranes, Good dentition, No lesions  NECK: Supple, No JVD, Normal thyroid  NERVOUS SYSTEM:  Alert & Oriented X3, Good concentration; Motor Strength 5/5 B/L upper and lower extremities, 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 Systolic murmur, Regular rate; rubs, or gallops  ABDOMEN: epigastric and ULQ tenderness to palpation, Soft, Nondistended; Bowel sounds present  EXTREMITIES:  2+ Peripheral Pulses, No clubbing, cyanosis, or edema  LYMPH: No lymphadenopathy noted  PSYCH: Normal mood and affect  SKIN: No rashes or lesions    LABS:             12.3   7.1   )-----------( 149      ( 2018 06:33 )             38.5     CBC Full  -  ( 2018 06:33 )  WBC Count : 7.1 K/uL  Hemoglobin : 12.3 g/dL  Hematocrit : 38.5 %  Platelet Count - Automated : 149 K/uL  Mean Cell Volume : 87.5 fl  Mean Cell Hemoglobin : 28.0 pg  Mean Cell Hemoglobin Concentration : 31.9 g/dL      137  |  98  |  27.0<H>  ----------------------------<  309<H>  4.6   |  23.0  |  1.59<H>    Ca    9.2      2018 06:33  Phos  4.3       Mg     2.0         Hemoglobin A1C, Whole Blood: 8.9 % ( @ 04:40)      CARDIOVASCULAR:  amLODIPine   Tablet 10 milliGRAM(s) Oral daily  carvedilol 12.5 milliGRAM(s) Oral every 12 hours  hydrALAZINE Injectable 10 milliGRAM(s) IV Push every 6 hours PRN  losartan 50 milliGRAM(s) Oral daily    NEUROLOGIC:  acetaminophen   Tablet .. 650 milliGRAM(s) Oral every 6 hours PRN  gabapentin 100 milliGRAM(s) Oral two times a day    HEMATOLOGIC:  aspirin  chewable 81 milliGRAM(s) Oral daily  heparin  Injectable 5000 Unit(s) SubCutaneous every 8 hours    GATROINTESTINAL:  aluminum hydroxide/magnesium hydroxide/simethicone Suspension 30 milliLiter(s) Oral every 6 hours PRN  pantoprazole    Tablet 40 milliGRAM(s) Oral two times a day    ENDO/METABOLIC:  atorvastatin 40 milliGRAM(s) Oral at bedtime  dextrose 40% Gel 15 Gram(s) Oral once PRN  dextrose 50% Injectable 12.5 Gram(s) IV Push once  dextrose 50% Injectable 25 Gram(s) IV Push once  dextrose 50% Injectable 25 Gram(s) IV Push once  glucagon  Injectable 1 milliGRAM(s) IntraMuscular once PRN  insulin glargine Injectable (LANTUS) 20 Unit(s) SubCutaneous at bedtime  insulin lispro (HumaLOG) corrective regimen sliding scale   SubCutaneous three times a day before meals  insulin lispro Injectable (HumaLOG) 3 Unit(s) SubCutaneous three times a day before meals  levothyroxine 137 MICROGram(s) Oral daily    IV FLUID/NUTRITION:  dextrose 5%. 1000 milliLiter(s) IV Continuous <Continuous>  sodium chloride 0.9%. 1000 milliLiter(s) IV Continuous <Continuous>      RADIOLOGY & ADDITIONAL TESTS:    < from: CT Abdomen and Pelvis No Cont (18 @ 23:09) >  IMPRESSION:   1. No inguinal, femoral or retroperitoneal hematoma.   2. Hiatal hernia. Cholelithiasis. Chest wall lipoma.    Images and report from [facility] were reviewed and approved with the following   additions:     No signs of bleeding. Scattered colonic diverticula in the left colon and   sigmoid.  < end of copied text >    < from: CT Angio Chest and Abdomen and Pelvis w/ IV Cont (18 @ 00:22) >  IMPRESSION: No aortic dissection.  < end of copied text >    < from: CT Head No Cont (18 @ 00:19) >  IMPRESSION:   1.  No CT evidence of acute intracranial hemorrhage, mass effect or acute   territorial infarct.  2.  Small patchy age indeterminate infarction left corona radiata and   left thalamus.  3.  Encephalomalacic cavity involving the left lentiform nucleus and   adjacent white matter may be sequela of prior hemorrhage or infarct.  4.  Follow-up MRI can be obtained for further characterization.  < end of copied text >    < from: US Duplex Carotid Arteries Complete, Bilateral (18 @ 22:00) >  Incidental nodular thyroid gland.    Impression:   No hemodynamically significant stenosis in the carotid or vertebral   arteries according to NASCET criteria.     Consider furtherevaluation of thyroid.  < end of copied text >    < from: MR Head No Cont (18 @ 11:18) >  FINDINGS:  There is a tiny central abnormal restricted diffusion right inferior   cerebellum image 7, series 4. Scattered periventricular and subcortical   white matter T2 /FLAIR hyperintensities are seen without mass effect,   nonspecific, likely representing moderate chronic microvascular changes.   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    Normal T2 flow-voids areseen within  the intracranial vasculature. The   lateral ventricles and cortical sulci are age-appropriate in size and   configuration. There is no mass, mass effect, or extra-axial fluid   collection. There is no susceptibility artifact to suggest hemorrhage.   Midline structures are normal. The patient is status post bilateral   ocular lens replacement surgery. The visualized paranasal sinuses,   mastoid air cells and orbits are otherwise unremarkable.    IMPRESSION: Acute tiny right cerebellum infarct.  < end of copied text > Patient is a 68y old  Female who presents with a chief complaint of Chest pain (07 Nov 2018 14:19)    INTERVAL HPI/OVERNIGHT EVENTS:  67yo Female seen at bedside and chart reviewed. Pt notes new onset CP substernal 7/10 comes and goes, sharp. Pt says chest pain remains has improved and abdominal pain has improved. Pt is eating, drinking urinating and moving bowels. She is urinating well with no issues. Feels better overall and is planning to see Dr miranda on OP for surgical intervention options for hernia     ROS: Pt denies cough, palpitations, Headache, lightheadedness, dizziness, nausea, vomiting, diarrhea, constipation, dysuria, lower extremity edema. All other ros negative    MONITOR: Sinus w/PVC and missed beat    Vital Signs Last 24 Hrs  T(C): 36.6 (08 Nov 2018 06:03), Max: 36.7 (07 Nov 2018 22:10)  T(F): 97.8 (08 Nov 2018 06:03), Max: 98.1 (07 Nov 2018 22:10)  HR: 60 (08 Nov 2018 06:03) (54 - 65)  BP: 124/70 (08 Nov 2018 06:03) (100/72 - 138/75)  RR: 16 (08 Nov 2018 06:03) (16 - 18)  SpO2: 98% (08 Nov 2018 06:03) (97% - 100%)  c/s [id number]    PHYSICAL EXAM:  GENERAL: NAD, well-groomed, well-developed, obese  HEENT: eomi, ncat  NERVOUS SYSTEM:  Alert & Oriented X3, Good concentration; Motor Strength 5/5 B/L upper and lower extremities, 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 Systolic murmur, Regular rate; rubs, or gallops  ABDOMEN: epigastric and ULQ tenderness to palpation, Soft, Nondistended; Bowel sounds present  EXTREMITIES:  2+ Peripheral Pulses, No clubbing, cyanosis, or edema  Skin: grossly intact    LABS:             12.3   7.1   )-----------( 149      ( 07 Nov 2018 06:33 )             38.5     11-07  137  |  98  |  27.0<H>  ----------------------------<  309<H>  4.6   |  23.0  |  1.59<H>    Ca    9.2      07 Nov 2018 06:33  Phos  4.3     11-07  Mg     2.0     11-07    Hemoglobin A1C, Whole Blood: 8.9 % (11-06 @ 04:40)    MEDICATIONS  (STANDING):  amLODIPine   Tablet 10 milliGRAM(s) Oral daily  aspirin  chewable 81 milliGRAM(s) Oral daily  atorvastatin 40 milliGRAM(s) Oral at bedtime  carvedilol 12.5 milliGRAM(s) Oral every 12 hours  dextrose 5%. 1000 milliLiter(s) (50 mL/Hr) IV Continuous <Continuous>  dextrose 50% Injectable 12.5 Gram(s) IV Push once  dextrose 50% Injectable 25 Gram(s) IV Push once  dextrose 50% Injectable 25 Gram(s) IV Push once  gabapentin 100 milliGRAM(s) Oral two times a day  heparin  Injectable 5000 Unit(s) SubCutaneous every 8 hours  insulin glargine Injectable (LANTUS) 20 Unit(s) SubCutaneous at bedtime  insulin lispro (HumaLOG) corrective regimen sliding scale   SubCutaneous three times a day before meals  insulin lispro Injectable (HumaLOG) 3 Unit(s) SubCutaneous three times a day before meals  levothyroxine 137 MICROGram(s) Oral daily  losartan 50 milliGRAM(s) Oral daily  pantoprazole    Tablet 40 milliGRAM(s) Oral two times a day    MEDICATIONS  (PRN):  acetaminophen   Tablet .. 650 milliGRAM(s) Oral every 6 hours PRN Mild Pain (1 - 3)  aluminum hydroxide/magnesium hydroxide/simethicone Suspension 30 milliLiter(s) Oral every 6 hours PRN Dyspepsia  dextrose 40% Gel 15 Gram(s) Oral once PRN Blood Glucose LESS THAN 70 milliGRAM(s)/deciliter  glucagon  Injectable 1 milliGRAM(s) IntraMuscular once PRN Glucose LESS THAN 70 milligrams/deciliter  hydrALAZINE Injectable 10 milliGRAM(s) IV Push every 6 hours PRN SBP above 175  nitroglycerin     SubLingual 0.4 milliGRAM(s) SubLingual every 5 minutes PRN Chest Pain  RADIOLOGY & ADDITIONAL TESTS:    < from: CT Abdomen and Pelvis No Cont (11.06.18 @ 23:09) >  IMPRESSION:   1. No inguinal, femoral or retroperitoneal hematoma.   2. Hiatal hernia. Cholelithiasis. Chest wall lipoma.    Images and report from [facility] were reviewed and approved with the following   additions:     No signs of bleeding. Scattered colonic diverticula in the left colon and   sigmoid.  < end of copied text >    < from: CT Angio Chest and Abdomen and Pelvis w/ IV Cont (11.06.18 @ 00:22) >  IMPRESSION: No aortic dissection.  < end of copied text >    < from: CT Head No Cont (11.06.18 @ 00:19) >  IMPRESSION:   1.  No CT evidence of acute intracranial hemorrhage, mass effect or acute   territorial infarct.  2.  Small patchy age indeterminate infarction left corona radiata and   left thalamus.  3.  Encephalomalacic cavity involving the left lentiform nucleus and   adjacent white matter may be sequela of prior hemorrhage or infarct.  4.  Follow-up MRI can be obtained for further characterization.  < end of copied text >    < from: US Duplex Carotid Arteries Complete, Bilateral (11.07.18 @ 22:00) >  Incidental nodular thyroid gland.    Impression:   No hemodynamically significant stenosis in the carotid or vertebral   arteries according to NASCET criteria.     Consider furtherevaluation of thyroid.  < end of copied text >    < from: MR Head No Cont (11.08.18 @ 11:18) >  FINDINGS:  There is a tiny central abnormal restricted diffusion right inferior   cerebellum image 7, series 4. Scattered periventricular and subcortical   white matter T2 /FLAIR hyperintensities are seen without mass effect,   nonspecific, likely representing moderate chronic microvascular changes.   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    Normal T2 flow-voids areseen within  the intracranial vasculature. The   lateral ventricles and cortical sulci are age-appropriate in size and   configuration. There is no mass, mass effect, or extra-axial fluid   collection. There is no susceptibility artifact to suggest hemorrhage.   Midline structures are normal. The patient is status post bilateral   ocular lens replacement surgery. The visualized paranasal sinuses,   mastoid air cells and orbits are otherwise unremarkable.    IMPRESSION: Acute tiny right cerebellum infarct.  < end of copied text > Patient is a 68y old  Female who presents with a chief complaint of Chest pain (07 Nov 2018 14:19)    INTERVAL HPI/OVERNIGHT EVENTS:  67yo Female seen at bedside and chart reviewed. Pt notes new onset CP substernal 7/10 comes and goes, sharp. Pt says chest pain remains has improved and abdominal pain has improved. Pt is eating, drinking urinating and moving bowels. She is urinating well with no issues. Feels better overall and is planning to see Dr miranda on OP for surgical intervention options for hernia     ROS: Pt denies cough, palpitations, Headache, lightheadedness, dizziness, nausea, vomiting, diarrhea, constipation, dysuria, lower extremity edema. All other ros negative    MONITOR: Sinus w/PVC and missed beat    Vital Signs Last 24 Hrs  T(C): 36.6 (08 Nov 2018 06:03), Max: 36.7 (07 Nov 2018 22:10)  T(F): 97.8 (08 Nov 2018 06:03), Max: 98.1 (07 Nov 2018 22:10)  HR: 60 (08 Nov 2018 06:03) (54 - 65)  BP: 124/70 (08 Nov 2018 06:03) (100/72 - 138/75)  RR: 16 (08 Nov 2018 06:03) (16 - 18)  SpO2: 98% (08 Nov 2018 06:03) (97% - 100%)  c/s [id number]    PHYSICAL EXAM:  GENERAL: NAD, well-groomed, well-developed, obese  HEENT: eomi, ncat  NERVOUS SYSTEM:  Alert & Oriented X3, Good concentration; Motor Strength 5/5 B/L upper and lower extremities, 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 Systolic murmur, Regular rate; rubs, or gallops  ABDOMEN: epigastric and ULQ tenderness to palpation, Soft, Nondistended; Bowel sounds present  EXTREMITIES:  2+ Peripheral Pulses, No clubbing, cyanosis, or edema  Skin: grossly intact    LABS:             12.3   7.1   )-----------( 149      ( 07 Nov 2018 06:33 )             38.5     11-07  137  |  98  |  27.0<H>  ----------------------------<  309<H>  4.6   |  23.0  |  1.59<H>    Ca    9.2      07 Nov 2018 06:33  Phos  4.3     11-07  Mg     2.0     11-07    Hemoglobin A1C, Whole Blood: 8.9 % (11-06 @ 04:40)    MEDICATIONS  (STANDING):  amLODIPine   Tablet 10 milliGRAM(s) Oral daily  aspirin  chewable 81 milliGRAM(s) Oral daily  atorvastatin 40 milliGRAM(s) Oral at bedtime  carvedilol 12.5 milliGRAM(s) Oral every 12 hours  dextrose 5%. 1000 milliLiter(s) (50 mL/Hr) IV Continuous <Continuous>  dextrose 50% Injectable 12.5 Gram(s) IV Push once  dextrose 50% Injectable 25 Gram(s) IV Push once  dextrose 50% Injectable 25 Gram(s) IV Push once  gabapentin 100 milliGRAM(s) Oral two times a day  heparin  Injectable 5000 Unit(s) SubCutaneous every 8 hours  insulin glargine Injectable (LANTUS) 20 Unit(s) SubCutaneous at bedtime  insulin lispro (HumaLOG) corrective regimen sliding scale   SubCutaneous three times a day before meals  insulin lispro Injectable (HumaLOG) 3 Unit(s) SubCutaneous three times a day before meals  levothyroxine 137 MICROGram(s) Oral daily  losartan 50 milliGRAM(s) Oral daily  pantoprazole    Tablet 40 milliGRAM(s) Oral two times a day    MEDICATIONS  (PRN):  acetaminophen   Tablet .. 650 milliGRAM(s) Oral every 6 hours PRN Mild Pain (1 - 3)  aluminum hydroxide/magnesium hydroxide/simethicone Suspension 30 milliLiter(s) Oral every 6 hours PRN Dyspepsia  dextrose 40% Gel 15 Gram(s) Oral once PRN Blood Glucose LESS THAN 70 milliGRAM(s)/deciliter  glucagon  Injectable 1 milliGRAM(s) IntraMuscular once PRN Glucose LESS THAN 70 milligrams/deciliter  hydrALAZINE Injectable 10 milliGRAM(s) IV Push every 6 hours PRN SBP above 175  nitroglycerin     SubLingual 0.4 milliGRAM(s) SubLingual every 5 minutes PRN Chest Pain  RADIOLOGY & ADDITIONAL TESTS:    < from: CT Abdomen and Pelvis No Cont (11.06.18 @ 23:09) >  IMPRESSION:   1. No inguinal, femoral or retroperitoneal hematoma.   2. Hiatal hernia. Cholelithiasis. Chest wall lipoma.    Images and report from [facility] were reviewed and approved with the following   additions:     No signs of bleeding. Scattered colonic diverticula in the left colon and   sigmoid.  < end of copied text >    < from: CT Angio Chest and Abdomen and Pelvis w/ IV Cont (11.06.18 @ 00:22) >  IMPRESSION: No aortic dissection.  < end of copied text >    < from: CT Head No Cont (11.06.18 @ 00:19) >  IMPRESSION:   1.  No CT evidence of acute intracranial hemorrhage, mass effect or acute   territorial infarct.  2.  Small patchy age indeterminate infarction left corona radiata and   left thalamus.  3.  Encephalomalacic cavity involving the left lentiform nucleus and   adjacent white matter may be sequela of prior hemorrhage or infarct.  4.  Follow-up MRI can be obtained for further characterization.  < end of copied text >    < from: US Duplex Carotid Arteries Complete, Bilateral (11.07.18 @ 22:00) >  Incidental nodular thyroid gland.    Impression:   No hemodynamically significant stenosis in the carotid or vertebral   arteries according to NASCET criteria.     Consider furtherevaluation of thyroid.  < end of copied text >    < from: MR Head No Cont (11.08.18 @ 11:18) >  FINDINGS:  There is a tiny central abnormal restricted diffusion right inferior   cerebellum image 7, series 4. Scattered periventricular and subcortical   white matter T2 /FLAIR hyperintensities are seen without mass effect,   nonspecific, likely representing moderate chronic microvascular changes.   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    Normal T2 flow-voids areseen within  the intracranial vasculature. The   lateral ventricles and cortical sulci are age-appropriate in size and   configuration. There is no mass, mass effect, or extra-axial fluid   collection. There is no susceptibility artifact to suggest hemorrhage.   Midline structures are normal. The patient is status post bilateral   ocular lens replacement surgery. The visualized paranasal sinuses,   mastoid air cells and orbits are otherwise unremarkable.    IMPRESSION: Acute tiny right cerebellum infarct.  < end of copied text >

## 2018-11-08 NOTE — PROGRESS NOTE ADULT - ATTENDING COMMENTS
Note addended where needed. Plan discussed with pt, team and daughter over the phone
Note addended where needed. Plan discussed with patient at bedside. Offered to speak to family -asked daughter to be updated; will call with updates. d/w Dr. Erazo
Note addended where needed. Plan discussed with patient at bedside. Plan d/w daughter also and cardiac NP

## 2018-11-08 NOTE — PROGRESS NOTE ADULT - ASSESSMENT
68F presented with chest pain found to have a moderate to large hiatal hernia.      11/8 Recomending outpatient followup on 11/15 in his office.

## 2018-11-08 NOTE — PROGRESS NOTE ADULT - ASSESSMENT
69 y/o female with PMH of CAD, S/P CABG( LIMA to the D1 and LAD, SVG to the RI, sequential SVG to the OM and PDA), DM, HTN, HLD, stage 2 CKD, hypothyroid, and obesity c/o nonexertional chest pain and MENEZES Nonradiating chest pain. Presents to the ED with 8-10/10 left chest pain described as sharp to dull that increases with arm movement, breathing and palpitation, for the past 20 hours. Cathed and found to grafts open.       Coronary artery disease  Continue meds      Hypotension  Resolved    Sinus Bradycardia  Resolved  May d/c telemetry      CVA  Dysarthria with hypertensive urgency  CT of brain is negative   for MRI of brain pending  Carotid Doppler  No hemodynamically significant stenosis in the carotid or vertebral   arteries.      Moderate to large hiatal hernia.  Thoracic surgery input noted  Recommend outpatient follow up on 11/15 with Dr. Perez at his office    AS  Echo showed  mod AS  Medical management

## 2018-11-08 NOTE — PROGRESS NOTE ADULT - ASSESSMENT
The patient is a 68y Female with transient slurred speech in the setting of chest pain.     CVA.    MRI brain as above.  tiny right cbl CVA incidental finding wiull not cause the right weakness noted on presentation  echo as above  carotid duplex as above  defere w/u of thyroid to medicine, she is hypothyroid on synthroid  Continue antiplatelet and statin.     Chest pain  Plan per medicine.     Diabetic peripheral neuropathy   Continue Gabapentin.    Case discussed with Dr Mccall  will follow with you    Samir Pritchett MD PhD   [phone]

## 2018-11-08 NOTE — CHART NOTE - NSCHARTNOTEFT_GEN_A_CORE
Put in request provider to RN to check FS glucose at 3 and 6am in order to understand if pt is bottoming out overnight on current Lantus 20.    Also, although home regimen was not adequate as her A1c was 8.9, Pt says her recent A1c 3mo ago with her doctor was 7.1. She seems to follow up regularly with him, and given that her home diet will be different, I think she should go home on her home insulin rather than her hospital regimen and explore it with Dr Hubbard, her endocrinologist.    DANNIE Staley PGY2

## 2018-11-08 NOTE — PROVIDER CONTACT NOTE (CRITICAL VALUE NOTIFICATION) - ACTION/TREATMENT ORDERED:
Pt BS rechecked and was 149. Dt Óscar aware. As Per Md no sliding scale and given pre meal coverage.

## 2018-11-08 NOTE — PROGRESS NOTE ADULT - ASSESSMENT
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 Admitted for Hypertensive Emergency w/chest pain, headache and dysarthria    New onset Chest Pain  Stat EKG, Trops x 3, CK, BNP, (Mg and Phos in AM labs)  Stat Nitro x 3 PRN for pain, Morphine 1mg IVP  Stat Maalox, Tums, Protonix 40mg already on board  TTE yesterday LVEF > 75% Grade I Diastolic, AS, Hiatal Hernia    Hypertensive Emergency-resolved   EKG: NSR w/ minor ST elevation in lead III, not seen on repeat EKG  s/p LHC on 11/6/18 : patent grafts, cardio recommend d/c plavix   Trop neg x2  Reduce MAP slowly, 25% in first 4 hours then gradually thereafter  s/p Norvasc 5mg PO, Coreg 25mg PO, Hydralazine 10mg IV x2  Continue home meds, Coreg at half dose (12.5mg BID), Norvasc 10mg QD  Hydralazine and HCTZ held  will d/c lasix for now until improvement of renal function no LE edema  Cardio consult, [facility] - s/p cath with clean coronaries,  ppi  increased to bid and getting surgery involved for hernia intervention (likely to happen as OP)     SHAHNAZ  most likely 2/2 to contrast after ct scans on admission and C on 11/6/18  Cr= 1.59 BUN = 27, CO2= 23 ( no acidosis )   plasmalyte 1 lit   will d/c lasix, ARB not DCed  Pending cr and BUN in the am    New onset Dysarthria  resolved   Prior hx of CVA  CT Head: neg for acute ischemic or hemorrhagic infarct,   Outside of window for tPA  MRI Head pending;  Echo done: Hiatal Hernia, LVEF > 75%, aortic stenosis  carotid pending read  Neuro consulted: possible TIA , agree w/ MRI, continue asa    MENEZES possible CHF  Gradually worsening, exercise tolerance down to 1/2 block  LE edema resolved   Stress test from 05/2018: Post stress LVEF 76%  Echo: aortic stenosis     Diabetes Mellitus  blood sugar not well controlled   Yesterday: 7:00 285 2premeal + 6u, 1200 424 12u + 2premeal + 10u, 1800 121, 2130 1666 (lantus inc to 20u)  Accuchecks premeal + QHS  Moderate insulin SS  HBA1c: 8.9%  B/l peripheral diabetic neuropathy, continue Gabapentin 100mg BID    Hiatal Hernia  Moderate in size as seen on CT Abd  Pain with ingestion of food  Continue Protonix 40mg BID  Surgery consult: recommending outpatient followup on 11/15 in his office.    CAD s/p CABG  Continue ASA   Plavix was DCed as requested by cardio     Hypothyroidism  Continue home med, Synthroid 137mcg QD  TSH noted = 8.9  will d/w patient regarding when synthroid was last adjusted     Preventative Measure  Heparin 5000U Q8h    Disposition:  PT: home w/o PT  MRI is pending , need to monitor renal function due to SHAHNAZ 67 yo female with PMH of HTN, CAD, CABG (2013), HLD, Hypothyroidism, DMII on insulin, CVA (2004, 2013) w/ residual R lower facial droop, Breast Cancer s/p mastectomy 1984, glaucoma and blindness in Left eye presents with CP along with severely uncontrolled hypertension. Admitted for Hypertensive Emergency w/chest pain, headache and dysarthria. Patient had a negative cath on admission. Her MRI showed an acute tiny right cerebellum cva. Post contrast study, developed shahnaz with resolution in her cr while here     Recurrent epigastric pain   -c/w maalox/ppi   -cath negative   -trop negative   -d/c nitro ordered  -ekg noted, qtc noted in new ekg as 490, 480 in the past  Stat Maalox, Tums, Protonix 40mg already on board  TTE yesterday LVEF > 75% Grade I Diastolic, AS, Hiatal Hernia    Hypertensive Emergency-resolved   s/p LHC on 11/6/18 : patent grafts, cardio recommend d/c plavix r  s/p Norvasc 5mg PO, Coreg 25mg PO, Hydralazine 10mg IV x2  c/w norvasc 10, coreg and losartan at 50mg dose and monitor BP and Cr   will d/c lasix for now until improvement of renal function no LE edema; resume on d/c at lower dose  Cardio consult, [facility] - s/p cath with clean coronaries,  ppi  increased to bid and getting surgery involved for hernia intervention (likely to happen as OP)     SHAHNAZ- resolving   most likely 2/2 to contrast after ct scans on admission and LHC on 11/6/18  Initial jump to Cr= 1.59 BUN = 27, CO2= 23 ( no acidosis )   plasmalyte 1 lit given  will d/c lasix, ARB not DCed  Pending cr and BUN in the am and if continues to trend down, will d/c home in am on 11/9     New onset Dysarthria  resolved - confirmed acute tiny right cerebellum cva   Prior hx of CVA  -ct/mri noted  -US negative  Echo done: Hiatal Hernia, LVEF > 75%, aortic stenosis  neuro on board   -c/w aspirin and statin, increase statin to 80  -a1c 8.8,      Diabetes Mellitus  blood sugar not well controlled   Accuchecks premeal + QHS  Moderate insulin SS  HBA1c: 8.9%  B/l peripheral diabetic neuropathy, continue Gabapentin 100mg BID    Hiatal Hernia  likely cause of some of the epigastric discomfort   Moderate in size as seen on CT Abd  Pain with ingestion of food  Continue Protonix 40mg BID  Surgery consult: recommending outpatient followup on 11/15 in his office.    CAD s/p CABG  Continue ASA   Plavix was DCed as requested by cardio     Hypothyroidism  Continue home med, Synthroid 137mcg QD  TSH noted = 8.9  will d/w patient regarding when synthroid was last adjusted     Preventative Measure  Heparin 5000U Q8h    Disposition:  PT: home w/o PT  d/c home in am 69 yo female with PMH of HTN, CAD, CABG (2013), HLD, Hypothyroidism, DMII on insulin, CVA (2004, 2013) w/ residual R lower facial droop, Breast Cancer s/p mastectomy 1984, glaucoma and blindness in Left eye presents with CP along with severely uncontrolled hypertension. Admitted for Hypertensive Emergency w/chest pain, headache and dysarthria. Patient had a negative cath on admission. Her MRI showed an acute tiny right cerebellum cva. Post contrast study, developed shahnaz with resolution in her cr while here     Recurrent epigastric pain   -c/w maalox/ppi   -cath negative   -trop negative   -d/c nitro ordered  -ekg noted, qtc noted in new ekg as 490, 480 in the past  Stat Maalox, Tums, Protonix 40mg already on board  TTE yesterday LVEF > 75% Grade I Diastolic, AS, Hiatal Hernia    Hypertensive Emergency-resolved   s/p LHC on 11/6/18 : patent grafts, cardio recommend d/c plavix r  s/p Norvasc 5mg PO, Coreg 25mg PO, Hydralazine 10mg IV x2  c/w norvasc 10, coreg and losartan at 50mg dose and monitor BP and Cr   will d/c lasix for now until improvement of renal function no LE edema; resume on d/c at lower dose  Cardio consult, [facility] - s/p cath with clean coronaries,  ppi  increased to bid and getting surgery involved for hernia intervention (likely to happen as OP)     SHAHNAZ- resolving   most likely 2/2 to contrast after ct scans on admission and LHC on 11/6/18  Initial jump to Cr= 1.59 BUN = 27, CO2= 23 ( no acidosis )   plasmalyte 1 lit given  will d/c lasix, ARB not DCed  Pending cr and BUN in the am and if continues to trend down, will d/c home in am on 11/9     New onset Dysarthria  resolved - confirmed acute tiny right cerebellum cva   Prior hx of CVA  -ct/mri noted  -US negative  Echo done: Hiatal Hernia, LVEF > 75%, moderate aortic stenosis  neuro on board   -c/w aspirin and statin, increase statin to 80  -a1c 8.8,      Diabetes Mellitus  blood sugar not well controlled   Accuchecks premeal + QHS  Moderate insulin SS  HBA1c: 8.9% was 8.1 in 2015  B/l peripheral diabetic neuropathy, continue Gabapentin 100mg BID    Hiatal Hernia  likely cause of some of the epigastric discomfort   Moderate in size as seen on CT Abd  Pain with ingestion of food  Continue Protonix 40mg BID  Surgery consult: recommending outpatient followup on 11/15 in his office.    CAD s/p CABG  Continue ASA   Plavix was DCed as requested by cardio     Hypothyroidism  Continue home med, Synthroid 137mcg QD  TSH noted = 8.9  will d/w patient regarding when synthroid was last adjusted     Preventative Measure  Heparin 5000U Q8h    Disposition:  PT: home w/o PT  d/c home in am

## 2018-11-09 VITALS
SYSTOLIC BLOOD PRESSURE: 138 MMHG | OXYGEN SATURATION: 98 % | DIASTOLIC BLOOD PRESSURE: 68 MMHG | TEMPERATURE: 98 F | RESPIRATION RATE: 20 BRPM | HEART RATE: 60 BPM

## 2018-11-09 PROBLEM — Z00.00 ENCOUNTER FOR PREVENTIVE HEALTH EXAMINATION: Status: ACTIVE | Noted: 2018-11-09

## 2018-11-09 LAB
ANION GAP SERPL CALC-SCNC: 10 MMOL/L — SIGNIFICANT CHANGE UP (ref 5–17)
BUN SERPL-MCNC: 30 MG/DL — HIGH (ref 8–20)
CALCIUM SERPL-MCNC: 9.3 MG/DL — SIGNIFICANT CHANGE UP (ref 8.6–10.2)
CHLORIDE SERPL-SCNC: 102 MMOL/L — SIGNIFICANT CHANGE UP (ref 98–107)
CO2 SERPL-SCNC: 28 MMOL/L — SIGNIFICANT CHANGE UP (ref 22–29)
CREAT SERPL-MCNC: 1.08 MG/DL — SIGNIFICANT CHANGE UP (ref 0.5–1.3)
GLUCOSE BLDC GLUCOMTR-MCNC: 142 MG/DL — HIGH (ref 70–99)
GLUCOSE BLDC GLUCOMTR-MCNC: 256 MG/DL — HIGH (ref 70–99)
GLUCOSE BLDC GLUCOMTR-MCNC: 90 MG/DL — SIGNIFICANT CHANGE UP (ref 70–99)
GLUCOSE SERPL-MCNC: 74 MG/DL — SIGNIFICANT CHANGE UP (ref 70–115)
POTASSIUM SERPL-MCNC: 4.7 MMOL/L — SIGNIFICANT CHANGE UP (ref 3.5–5.3)
POTASSIUM SERPL-SCNC: 4.7 MMOL/L — SIGNIFICANT CHANGE UP (ref 3.5–5.3)
SODIUM SERPL-SCNC: 140 MMOL/L — SIGNIFICANT CHANGE UP (ref 135–145)
TROPONIN T SERPL-MCNC: <0.01 NG/ML — SIGNIFICANT CHANGE UP (ref 0–0.06)

## 2018-11-09 PROCEDURE — 83735 ASSAY OF MAGNESIUM: CPT

## 2018-11-09 PROCEDURE — 99153 MOD SED SAME PHYS/QHP EA: CPT

## 2018-11-09 PROCEDURE — 84100 ASSAY OF PHOSPHORUS: CPT

## 2018-11-09 PROCEDURE — 36415 COLL VENOUS BLD VENIPUNCTURE: CPT

## 2018-11-09 PROCEDURE — 71275 CT ANGIOGRAPHY CHEST: CPT

## 2018-11-09 PROCEDURE — C1887: CPT

## 2018-11-09 PROCEDURE — 70551 MRI BRAIN STEM W/O DYE: CPT

## 2018-11-09 PROCEDURE — C1769: CPT

## 2018-11-09 PROCEDURE — 99152 MOD SED SAME PHYS/QHP 5/>YRS: CPT

## 2018-11-09 PROCEDURE — 80053 COMPREHEN METABOLIC PANEL: CPT

## 2018-11-09 PROCEDURE — 96376 TX/PRO/DX INJ SAME DRUG ADON: CPT | Mod: XU

## 2018-11-09 PROCEDURE — 85027 COMPLETE CBC AUTOMATED: CPT

## 2018-11-09 PROCEDURE — 99285 EMERGENCY DEPT VISIT HI MDM: CPT | Mod: 25

## 2018-11-09 PROCEDURE — 82962 GLUCOSE BLOOD TEST: CPT

## 2018-11-09 PROCEDURE — 85730 THROMBOPLASTIN TIME PARTIAL: CPT

## 2018-11-09 PROCEDURE — C1760: CPT

## 2018-11-09 PROCEDURE — 71045 X-RAY EXAM CHEST 1 VIEW: CPT

## 2018-11-09 PROCEDURE — C1894: CPT

## 2018-11-09 PROCEDURE — 74176 CT ABD & PELVIS W/O CONTRAST: CPT

## 2018-11-09 PROCEDURE — 80048 BASIC METABOLIC PNL TOTAL CA: CPT

## 2018-11-09 PROCEDURE — 83036 HEMOGLOBIN GLYCOSYLATED A1C: CPT

## 2018-11-09 PROCEDURE — 80061 LIPID PANEL: CPT

## 2018-11-09 PROCEDURE — 82550 ASSAY OF CK (CPK): CPT

## 2018-11-09 PROCEDURE — 74174 CTA ABD&PLVS W/CONTRAST: CPT

## 2018-11-09 PROCEDURE — 93459 L HRT ART/GRFT ANGIO: CPT

## 2018-11-09 PROCEDURE — 84443 ASSAY THYROID STIM HORMONE: CPT

## 2018-11-09 PROCEDURE — 85610 PROTHROMBIN TIME: CPT

## 2018-11-09 PROCEDURE — 99239 HOSP IP/OBS DSCHRG MGMT >30: CPT

## 2018-11-09 PROCEDURE — 93005 ELECTROCARDIOGRAM TRACING: CPT

## 2018-11-09 PROCEDURE — 97167 OT EVAL HIGH COMPLEX 60 MIN: CPT

## 2018-11-09 PROCEDURE — 84484 ASSAY OF TROPONIN QUANT: CPT

## 2018-11-09 PROCEDURE — 93880 EXTRACRANIAL BILAT STUDY: CPT

## 2018-11-09 PROCEDURE — 76937 US GUIDE VASCULAR ACCESS: CPT

## 2018-11-09 PROCEDURE — 81001 URINALYSIS AUTO W/SCOPE: CPT

## 2018-11-09 PROCEDURE — 93306 TTE W/DOPPLER COMPLETE: CPT

## 2018-11-09 PROCEDURE — 70450 CT HEAD/BRAIN W/O DYE: CPT

## 2018-11-09 PROCEDURE — 97163 PT EVAL HIGH COMPLEX 45 MIN: CPT

## 2018-11-09 PROCEDURE — 99232 SBSQ HOSP IP/OBS MODERATE 35: CPT

## 2018-11-09 PROCEDURE — 96374 THER/PROPH/DIAG INJ IV PUSH: CPT | Mod: XU

## 2018-11-09 RX ORDER — LOSARTAN POTASSIUM 100 MG/1
1 TABLET, FILM COATED ORAL
Qty: 30 | Refills: 0
Start: 2018-11-09 | End: 2018-12-08

## 2018-11-09 RX ORDER — INSULIN GLARGINE 100 [IU]/ML
25 INJECTION, SOLUTION SUBCUTANEOUS
Qty: 0 | Refills: 0 | COMMUNITY

## 2018-11-09 RX ORDER — PANTOPRAZOLE SODIUM 20 MG/1
1 TABLET, DELAYED RELEASE ORAL
Qty: 60 | Refills: 0
Start: 2018-11-09 | End: 2018-12-08

## 2018-11-09 RX ORDER — CARVEDILOL PHOSPHATE 80 MG/1
1 CAPSULE, EXTENDED RELEASE ORAL
Qty: 60 | Refills: 0 | OUTPATIENT
Start: 2018-11-09 | End: 2018-12-08

## 2018-11-09 RX ORDER — ATORVASTATIN CALCIUM 80 MG/1
1 TABLET, FILM COATED ORAL
Qty: 30 | Refills: 0
Start: 2018-11-09 | End: 2018-12-08

## 2018-11-09 RX ORDER — INSULIN GLARGINE 100 [IU]/ML
28 INJECTION, SOLUTION SUBCUTANEOUS
Qty: 0 | Refills: 0 | COMMUNITY

## 2018-11-09 RX ORDER — LEVOTHYROXINE SODIUM 125 MCG
1 TABLET ORAL
Qty: 30 | Refills: 0
Start: 2018-11-09 | End: 2018-12-08

## 2018-11-09 RX ORDER — INSULIN ASPART 100 [IU]/ML
10 INJECTION, SOLUTION SUBCUTANEOUS
Qty: 0 | Refills: 0 | COMMUNITY

## 2018-11-09 RX ORDER — AMLODIPINE BESYLATE 2.5 MG/1
1 TABLET ORAL
Qty: 30 | Refills: 0
Start: 2018-11-09 | End: 2018-12-08

## 2018-11-09 RX ADMIN — HEPARIN SODIUM 5000 UNIT(S): 5000 INJECTION INTRAVENOUS; SUBCUTANEOUS at 05:11

## 2018-11-09 RX ADMIN — PANTOPRAZOLE SODIUM 40 MILLIGRAM(S): 20 TABLET, DELAYED RELEASE ORAL at 05:11

## 2018-11-09 RX ADMIN — PANTOPRAZOLE SODIUM 40 MILLIGRAM(S): 20 TABLET, DELAYED RELEASE ORAL at 17:17

## 2018-11-09 RX ADMIN — Medication 6 UNIT(S): at 08:59

## 2018-11-09 RX ADMIN — Medication 6 UNIT(S): at 12:40

## 2018-11-09 RX ADMIN — GABAPENTIN 100 MILLIGRAM(S): 400 CAPSULE ORAL at 17:18

## 2018-11-09 RX ADMIN — GABAPENTIN 100 MILLIGRAM(S): 400 CAPSULE ORAL at 05:11

## 2018-11-09 RX ADMIN — Medication 6 UNIT(S): at 17:18

## 2018-11-09 RX ADMIN — LOSARTAN POTASSIUM 50 MILLIGRAM(S): 100 TABLET, FILM COATED ORAL at 05:11

## 2018-11-09 RX ADMIN — Medication 150 MICROGRAM(S): at 05:11

## 2018-11-09 RX ADMIN — CARVEDILOL PHOSPHATE 12.5 MILLIGRAM(S): 80 CAPSULE, EXTENDED RELEASE ORAL at 17:17

## 2018-11-09 RX ADMIN — AMLODIPINE BESYLATE 10 MILLIGRAM(S): 2.5 TABLET ORAL at 05:11

## 2018-11-09 RX ADMIN — CARVEDILOL PHOSPHATE 12.5 MILLIGRAM(S): 80 CAPSULE, EXTENDED RELEASE ORAL at 05:11

## 2018-11-09 RX ADMIN — Medication 81 MILLIGRAM(S): at 11:29

## 2018-11-09 RX ADMIN — Medication 6: at 12:40

## 2018-11-09 NOTE — PROGRESS NOTE ADULT - ASSESSMENT
67 y/o female with PMH of CAD, S/P CABG( LIMA to the D1 and LAD, SVG to the RI, sequential SVG to the OM and PDA), DM, HTN, HLD, stage 2 CKD, hypothyroid, and obesity c/o nonexertional chest pain and MENEZES Nonradiating chest pain. Presents to the ED with 8-10/10 left chest pain described as sharp to dull that increases with arm movement, breathing and palpitation, for the past 20 hours. Cathed and found to grafts open.       Coronary artery disease  Denied anginal symptoms  Stable on meds  Continue meds    Hypotension: Resolved  Monitor BP: may need to advance meds if BP elevated    Sinus Bradycardia: Resolved  Tolerates current BB dose  (Avoid increasing BB)  off telemetry now    CVA  Dysarthria : resolved  MRI: Acute tiny right cerebellum infarct.  Carotid Doppler  No hemodynamically significant stenosis in the carotid or vertebral arteries.  Cont Statin, ASA  Neurology following    Moderate to large hiatal hernia.  Thoracic surgery input noted  Recommend outpatient follow up on 11/15 with Dr. Perez at his office    AS  Echo showed  mod AS  Asymptomatic  Medical management

## 2018-11-09 NOTE — PROGRESS NOTE ADULT - SUBJECTIVE AND OBJECTIVE BOX
Patient is a 68y old  Female who presents with a chief complaint of Chest pain (09 Nov 2018 09:12)    OVERNIGHT EVENTS:  Patient seen and examined at bedside. She reports is feeling much better than when she first came in. She denies any current chest pain but continues to have mild epigastric pain, mostly with food ingestion. She states she only ate 1/4 of her dinner last night. She is producing urine and making normal BMs.    ROS: Denies any fevers, chills, H/A, CP, SOB, N/V/D/C, or dysuria.    Vitals  T(C): 36.9 (11-09-18 @ 10:22), Max: 36.9 (11-09-18 @ 05:07)  HR: 54 (11-09-18 @ 10:22) (54 - 89)  BP: 126/70 (11-09-18 @ 10:22) (120/66 - 161/75)  RR: 18 (11-09-18 @ 10:22) (15 - 18)  SpO2: 99% (11-09-18 @ 05:07) (98% - 99%)    Physical Exam:   GENERAL: NAD, well-groomed, well-developed, obese  HEENT: eomi, ncat  NERVOUS SYSTEM:  Alert & Oriented X3, Good concentration; Motor Strength 5/5 B/L upper and lower extremities, 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 Systolic murmur, Regular rate; rubs, or gallops  ABDOMEN: epigastric and ULQ tenderness to palpation, Soft, Nondistended; Bowel sounds present  EXTREMITIES:  2+ Peripheral Pulses, No clubbing, cyanosis, or edema  Skin: grossly intact    Labs  CAPILLARY BLOOD GLUCOSE    POCT Blood Glucose.: 256 mg/dL (09 Nov 2018 12:14)  POCT Blood Glucose.: 90 mg/dL (09 Nov 2018 08:18)  POCT Blood Glucose.: 281 mg/dL (08 Nov 2018 21:20)  POCT Blood Glucose.: 349 mg/dL (08 Nov 2018 17:21)    MEDICATIONS  (STANDING):  amLODIPine   Tablet 10 milliGRAM(s) Oral daily  aspirin  chewable 81 milliGRAM(s) Oral daily  atorvastatin 80 milliGRAM(s) Oral at bedtime  carvedilol 12.5 milliGRAM(s) Oral every 12 hours  dextrose 5%. 1000 milliLiter(s) (50 mL/Hr) IV Continuous <Continuous>  dextrose 50% Injectable 12.5 Gram(s) IV Push once  dextrose 50% Injectable 25 Gram(s) IV Push once  dextrose 50% Injectable 25 Gram(s) IV Push once  gabapentin 100 milliGRAM(s) Oral two times a day  heparin  Injectable 5000 Unit(s) SubCutaneous every 8 hours  insulin glargine Injectable (LANTUS) 20 Unit(s) SubCutaneous at bedtime  insulin lispro (HumaLOG) corrective regimen sliding scale   SubCutaneous three times a day before meals  insulin lispro Injectable (HumaLOG) 6 Unit(s) SubCutaneous three times a day before meals  levothyroxine 150 MICROGram(s) Oral daily  losartan 50 milliGRAM(s) Oral daily  pantoprazole    Tablet 40 milliGRAM(s) Oral two times a day    MEDICATIONS  (PRN):  acetaminophen   Tablet .. 650 milliGRAM(s) Oral every 6 hours PRN Mild Pain (1 - 3)  aluminum hydroxide/magnesium hydroxide/simethicone Suspension 30 milliLiter(s) Oral every 6 hours PRN Dyspepsia  dextrose 40% Gel 15 Gram(s) Oral once PRN Blood Glucose LESS THAN 70 milliGRAM(s)/deciliter  glucagon  Injectable 1 milliGRAM(s) IntraMuscular once PRN Glucose LESS THAN 70 milligrams/deciliter  hydrALAZINE Injectable 10 milliGRAM(s) IV Push every 6 hours PRN SBP above 175

## 2018-11-09 NOTE — PROGRESS NOTE ADULT - SUBJECTIVE AND OBJECTIVE BOX
CARDIOLOGY PROGRESS NOTE   ([facility])                                                                                                        Subjective: feels well, wants to go home, denied CP, dyspnea    Vitals:  T(C): 36.9 (11-09-18 @ 05:07), Max: 37 (11-08-18 @ 10:30)  HR: 89 (11-09-18 @ 05:07) (70 - 89)  BP: 161/75 (11-09-18 @ 05:07) (120/66 - 161/75)  RR: 15 (11-09-18 @ 05:07) (15 - 17)  SpO2: 99% (11-09-18 @ 05:07) (98% - 99%)  Wt(kg): --  I&O's Summary    08 Nov 2018 07:01  -  09 Nov 2018 07:00  --------------------------------------------------------  IN: 480 mL / OUT: 800 mL / NET: -320 mL          PHYSICAL EXAM:  Appearance: Normal	  HEENT:   Atraumatic  Cardiovascular: Normal S1 S2, No JVD, No murmurs, No edema  Respiratory: Lungs clear to auscultation	  Gastrointestinal:  Soft, Non-tender, + BS	  Skin: No rashes, No ecchymoses, No cyanosis  Neurologic: Alert and awake, able to move extremities  Extremities: No edema            CURRENT MEDICATIONS:  amLODIPine   Tablet 10 milliGRAM(s) Oral daily  carvedilol 12.5 milliGRAM(s) Oral every 12 hours  hydrALAZINE Injectable 10 milliGRAM(s) IV Push every 6 hours PRN  losartan 50 milliGRAM(s) Oral daily        acetaminophen   Tablet .. 650 milliGRAM(s) Oral every 6 hours PRN  gabapentin 100 milliGRAM(s) Oral two times a day    aluminum hydroxide/magnesium hydroxide/simethicone Suspension 30 milliLiter(s) Oral every 6 hours PRN  pantoprazole    Tablet 40 milliGRAM(s) Oral two times a day    atorvastatin 80 milliGRAM(s) Oral at bedtime  dextrose 40% Gel 15 Gram(s) Oral once PRN  dextrose 50% Injectable 12.5 Gram(s) IV Push once  dextrose 50% Injectable 25 Gram(s) IV Push once  dextrose 50% Injectable 25 Gram(s) IV Push once  glucagon  Injectable 1 milliGRAM(s) IntraMuscular once PRN  insulin glargine Injectable (LANTUS) 20 Unit(s) SubCutaneous at bedtime  insulin lispro (HumaLOG) corrective regimen sliding scale   SubCutaneous three times a day before meals  insulin lispro Injectable (HumaLOG) 6 Unit(s) SubCutaneous three times a day before meals  levothyroxine 150 MICROGram(s) Oral daily    aspirin  chewable 81 milliGRAM(s) Oral daily  dextrose 5%. 1000 milliLiter(s) IV Continuous <Continuous>  heparin  Injectable 5000 Unit(s) SubCutaneous every 8 hours      LABS:	 	                               12.3   7.4   )-----------( 151      ( 08 Nov 2018 07:28 )             38.3     11-09    140  |  102  |  30.0<H>  ----------------------------<  74  4.7   |  28.0  |  1.08    Ca    9.3      09 Nov 2018 05:54  Phos  4.0     11-08  Mg     2.2     11-08      proBNP: Serum Pro-Brain Natriuretic Peptide: 118 pg/mL (11-08 @ 09:13)    Lipid Profile: Date: 11-06 @ 04:40  Total cholesterol 186; Direct LDL: 111; HDL: 66; Triglycerides:45    HgA1c:   TSH: Thyroid Stimulating Hormone, Serum: 8.92 uIU/mL (11-06 @ 04:40)

## 2018-11-09 NOTE — PROGRESS NOTE ADULT - REASON FOR ADMISSION
Chest pain

## 2018-11-09 NOTE — PROGRESS NOTE ADULT - ASSESSMENT
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 Admitted for Hypertensive Emergency w/chest pain, headache and dysarthria. Patient had a negative cath on admission. Her MRI showed an acute tiny right cerebellum cva. Post contrast study, developed shahnaz with resolution in her cr while here. Pending discharge today.    Recurrent epigastric pain   -c/w maalox/ppi   -cath negative   -trop negative   -d/c nitro ordered  -ekg noted, qtc noted in new ekg as 490, 480 in the past  Stat Maalox, Tums, Protonix 40mg already on board  TTE yesterday LVEF > 75% Grade I Diastolic, AS, Hiatal Hernia  Likely secondary to Hiatal hernia  Has f/u appointment with Dr Perez on 11/15.    Hypertensive Emergency-resolved   s/p LHC on 11/6/18 : patent grafts, cardio recommend d/c plavix r  s/p Norvasc 5mg PO, Coreg 25mg PO, Hydralazine 10mg IV x2  c/w norvasc 10, coreg and losartan at 50mg dose and monitor BP and Cr   will d/c lasix for now until improvement of renal function no LE edema; resume on d/c at lower dose  Cardio consult, [facility] - s/p cath with clean coronaries,  ppi  increased to bid and getting surgery involved for hernia intervention (likely to happen as OP)     SHAHNAZ- resolving   most likely 2/2 to contrast after ct scans on admission and LHC on 11/6/18  Initial jump to Cr= 1.59 BUN = 27, CO2= 23 ( no acidosis )   plasmalyte 1 lit given  will d/c lasix, ARB not DCed  Pending cr and BUN in the am and if continues to trend down, will d/c home in am on 11/9     New onset Dysarthria  resolved - confirmed acute tiny right cerebellum cva   Prior hx of CVA  -ct/mri noted  -US negative  Echo done: Hiatal Hernia, LVEF > 75%, moderate aortic stenosis  neuro on board   -c/w aspirin and statin, increase statin to 80  -a1c 8.8,      Diabetes Mellitus  blood sugar not well controlled   Accuchecks premeal + QHS  Moderate insulin SS  HBA1c: 8.9% was 8.1 in 2015  B/l peripheral diabetic neuropathy, continue Gabapentin 100mg BID  Lantus increased to 20U, advised patient to continue this dose for a few days after discharge and to measure FS at home, if still high she may increase to her old home dose of 28U QHS    Hiatal Hernia  likely cause of some of the epigastric discomfort   Moderate in size as seen on CT Abd  Pain with ingestion of food  Continue Protonix 40mg BID  Outpatient followup on 11/15 in Dr Perez's office    CAD s/p CABG  Continue ASA   Plavix was DCed as requested by cardio     Hypothyroidism  Continue home med, Synthroid 137mcg QD  TSH noted = 8.9  will d/w patient regarding when synthroid was last adjusted     Preventative Measure  Heparin 5000U Q8h    Disposition:  PT: home w/o PT  d/c home in am 67 yo female with PMH of HTN, CAD, CABG (2013), HLD, Hypothyroidism, DMII on insulin, CVA (2004, 2013) w/ residual R lower facial droop, Breast Cancer s/p mastectomy 1984, glaucoma and blindness in Left eye presents with CP along with severely uncontrolled hypertension. Admitted for Hypertensive Emergency w/chest pain, headache and dysarthria. Patient had a negative cath on admission. Her MRI showed an acute tiny right cerebellum cva. Post contrast study, developed shahnaz with resolution in her cr while here. Pending discharge today.    Recurrent epigastric pain /Likely secondary to Hiatal hernia  -c/w maalox/ppi   -cath negative   -trop negative   -d/c nitro ordered  -ekg noted, qtc noted in new ekg as 490, 480 in the past  Stat Maalox, Tums, Protonix 40mg already on board  TTE yesterday LVEF > 75% Grade I Diastolic, AS,   Has f/u appointment with Dr Perez on 11/15.    Hypertensive Emergency-resolved   s/p LHC on 11/6/18 : patent grafts, cardio recommend d/c plavix r  s/p Norvasc 5mg PO, Coreg 25mg PO, Hydralazine 10mg IV x2  c/w norvasc 10, coreg and losartan at 50mg dose and monitor BP and Cr   will d/c lasix for now until improvement of renal function no LE edema; resume on d/c at lower dose  Cardio consult, [facility] - s/p cath with clean coronaries,  ppi  increased to bid and getting surgery involved for hernia intervention (likely to happen as OP)     SHAHNAZ- resolving   most likely 2/2 to contrast after ct scans on admission and LHC on 11/6/18  Initial jump to Cr= 1.59 BUN = 27, CO2= 23 ( no acidosis )   plasmalyte 1 lit given  will d/c lasix, ARB not DCed  Pending cr and BUN in the am and if continues to trend down, will d/c home in am on 11/9     New onset Dysarthria  resolved - confirmed acute tiny right cerebellum cva   Prior hx of CVA  -ct/mri noted  -US negative  Echo done: Hiatal Hernia, LVEF > 75%, moderate aortic stenosis  neuro on board   -c/w aspirin and statin, increase statin to 80  -a1c 8.8,      Diabetes Mellitus  blood sugar not well controlled   Accuchecks premeal + QHS  Moderate insulin SS  HBA1c: 8.9% was 8.1 in 2015  B/l peripheral diabetic neuropathy, continue Gabapentin 100mg BID  Lantus increased to 20U, advised patient to continue this dose for a few days after discharge and to measure FS at home, if still high she may increase to her old home dose of 28U QHS    Hiatal Hernia  likely cause of some of the epigastric discomfort   Moderate in size as seen on CT Abd  Pain with ingestion of food  Continue Protonix 40mg BID  Outpatient followup on 11/15 in Dr Perez's office    CAD s/p CABG  Continue ASA   Plavix was DCed as requested by cardio     Hypothyroidism  Continue home med, Synthroid 137mcg QD  TSH noted = 8.9  will d/w patient regarding when synthroid was last adjusted     Preventative Measure  Heparin 5000U Q8h    Disposition:  PT: home w/o PT  d/c home in am

## 2018-11-16 DIAGNOSIS — Z86.39 PERSONAL HISTORY OF OTHER ENDOCRINE, NUTRITIONAL AND METABOLIC DISEASE: ICD-10-CM

## 2018-11-16 DIAGNOSIS — Z86.79 PERSONAL HISTORY OF OTHER DISEASES OF THE CIRCULATORY SYSTEM: ICD-10-CM

## 2018-11-16 DIAGNOSIS — Z85.3 PERSONAL HISTORY OF MALIGNANT NEOPLASM OF BREAST: ICD-10-CM

## 2018-11-16 DIAGNOSIS — I10 ESSENTIAL (PRIMARY) HYPERTENSION: ICD-10-CM

## 2018-11-16 DIAGNOSIS — K44.9 DIAPHRAGMATIC HERNIA W/OUT OBSTRUCTION OR GANGRENE: ICD-10-CM

## 2018-11-16 DIAGNOSIS — Z86.69 PERSONAL HISTORY OF OTHER DISEASES OF THE NERVOUS SYSTEM AND SENSE ORGANS: ICD-10-CM

## 2018-11-16 DIAGNOSIS — Z86.73 PERSONAL HISTORY OF TRANSIENT ISCHEMIC ATTACK (TIA), AND CEREBRAL INFARCTION W/OUT RESIDUAL DEFICITS: ICD-10-CM

## 2018-11-16 RX ORDER — AMLODIPINE BESYLATE 10 MG/1
10 TABLET ORAL
Refills: 0 | Status: ACTIVE | COMMUNITY

## 2018-11-16 RX ORDER — CARVEDILOL 12.5 MG/1
12.5 TABLET, FILM COATED ORAL
Refills: 0 | Status: ACTIVE | COMMUNITY

## 2018-11-16 RX ORDER — ATORVASTATIN CALCIUM 40 MG/1
40 TABLET, FILM COATED ORAL
Refills: 0 | Status: ACTIVE | COMMUNITY

## 2018-11-16 RX ORDER — LEVOTHYROXINE SODIUM 0.14 MG/1
137 TABLET ORAL
Refills: 0 | Status: ACTIVE | COMMUNITY

## 2018-11-29 ENCOUNTER — APPOINTMENT (OUTPATIENT)
Dept: THORACIC SURGERY | Facility: CLINIC | Age: 68
End: 2018-11-29
Payer: MEDICARE

## 2018-11-29 VITALS
DIASTOLIC BLOOD PRESSURE: 91 MMHG | RESPIRATION RATE: 18 BRPM | HEART RATE: 68 BPM | HEIGHT: 62 IN | SYSTOLIC BLOOD PRESSURE: 159 MMHG | BODY MASS INDEX: 37.54 KG/M2 | OXYGEN SATURATION: 100 % | WEIGHT: 204 LBS | TEMPERATURE: 98 F

## 2018-11-29 PROCEDURE — 99213 OFFICE O/P EST LOW 20 MIN: CPT

## 2018-11-29 RX ORDER — PANTOPRAZOLE 40 MG/1
40 TABLET, DELAYED RELEASE ORAL DAILY
Refills: 0 | Status: ACTIVE | COMMUNITY
Start: 2018-11-29

## 2018-11-29 RX ORDER — ASPIRIN 81 MG/1
81 TABLET ORAL DAILY
Refills: 0 | Status: ACTIVE | COMMUNITY
Start: 2018-11-29

## 2018-11-29 RX ORDER — FUROSEMIDE 40 MG/1
40 TABLET ORAL
Refills: 0 | Status: COMPLETED | COMMUNITY
End: 2018-11-29

## 2018-11-29 RX ORDER — INSULIN GLARGINE 100 [IU]/ML
100 INJECTION, SOLUTION SUBCUTANEOUS AT BEDTIME
Refills: 0 | Status: ACTIVE | COMMUNITY
Start: 2018-11-29

## 2018-11-29 RX ORDER — ASPIRIN 325 MG/1
325 TABLET, COATED ORAL
Refills: 0 | Status: COMPLETED | COMMUNITY
End: 2018-11-29

## 2018-11-29 RX ORDER — GABAPENTIN 100 MG/1
100 CAPSULE ORAL
Refills: 0 | Status: COMPLETED | COMMUNITY
End: 2018-11-29

## 2018-11-29 RX ORDER — CLOPIDOGREL BISULFATE 75 MG/1
75 TABLET, FILM COATED ORAL
Refills: 0 | Status: COMPLETED | COMMUNITY
End: 2018-11-29

## 2019-01-11 ENCOUNTER — OUTPATIENT (OUTPATIENT)
Dept: OUTPATIENT SERVICES | Facility: HOSPITAL | Age: 69
LOS: 1 days | End: 2019-01-11
Payer: MEDICARE

## 2019-01-11 VITALS
HEART RATE: 69 BPM | RESPIRATION RATE: 20 BRPM | DIASTOLIC BLOOD PRESSURE: 106 MMHG | HEIGHT: 62 IN | SYSTOLIC BLOOD PRESSURE: 186 MMHG | WEIGHT: 198.2 LBS | TEMPERATURE: 98 F

## 2019-01-11 DIAGNOSIS — Z98.89 OTHER SPECIFIED POSTPROCEDURAL STATES: Chronic | ICD-10-CM

## 2019-01-11 DIAGNOSIS — Z90.10 ACQUIRED ABSENCE OF UNSPECIFIED BREAST AND NIPPLE: Chronic | ICD-10-CM

## 2019-01-11 DIAGNOSIS — I25.10 ATHEROSCLEROTIC HEART DISEASE OF NATIVE CORONARY ARTERY WITHOUT ANGINA PECTORIS: ICD-10-CM

## 2019-01-11 DIAGNOSIS — E11.9 TYPE 2 DIABETES MELLITUS WITHOUT COMPLICATIONS: ICD-10-CM

## 2019-01-11 DIAGNOSIS — Z95.1 PRESENCE OF AORTOCORONARY BYPASS GRAFT: Chronic | ICD-10-CM

## 2019-01-11 DIAGNOSIS — K44.9 DIAPHRAGMATIC HERNIA WITHOUT OBSTRUCTION OR GANGRENE: ICD-10-CM

## 2019-01-11 DIAGNOSIS — Z01.818 ENCOUNTER FOR OTHER PREPROCEDURAL EXAMINATION: ICD-10-CM

## 2019-01-11 LAB
ANION GAP SERPL CALC-SCNC: 8 MMOL/L — SIGNIFICANT CHANGE UP (ref 5–17)
BUN SERPL-MCNC: 18 MG/DL — SIGNIFICANT CHANGE UP (ref 8–20)
CALCIUM SERPL-MCNC: 9.4 MG/DL — SIGNIFICANT CHANGE UP (ref 8.6–10.2)
CHLORIDE SERPL-SCNC: 102 MMOL/L — SIGNIFICANT CHANGE UP (ref 98–107)
CO2 SERPL-SCNC: 29 MMOL/L — SIGNIFICANT CHANGE UP (ref 22–29)
CREAT SERPL-MCNC: 1.09 MG/DL — SIGNIFICANT CHANGE UP (ref 0.5–1.3)
GLUCOSE SERPL-MCNC: 291 MG/DL — HIGH (ref 70–115)
HBA1C BLD-MCNC: 8.1 % — HIGH (ref 4–5.6)
HCT VFR BLD CALC: 39.3 % — SIGNIFICANT CHANGE UP (ref 37–47)
HGB BLD-MCNC: 12.7 G/DL — SIGNIFICANT CHANGE UP (ref 12–16)
MCHC RBC-ENTMCNC: 28.9 PG — SIGNIFICANT CHANGE UP (ref 27–31)
MCHC RBC-ENTMCNC: 32.3 G/DL — SIGNIFICANT CHANGE UP (ref 32–36)
MCV RBC AUTO: 89.3 FL — SIGNIFICANT CHANGE UP (ref 81–99)
PLATELET # BLD AUTO: 171 K/UL — SIGNIFICANT CHANGE UP (ref 150–400)
POTASSIUM SERPL-MCNC: 5.3 MMOL/L — SIGNIFICANT CHANGE UP (ref 3.5–5.3)
POTASSIUM SERPL-SCNC: 5.3 MMOL/L — SIGNIFICANT CHANGE UP (ref 3.5–5.3)
RBC # BLD: 4.4 M/UL — SIGNIFICANT CHANGE UP (ref 4.4–5.2)
RBC # FLD: 14.5 % — SIGNIFICANT CHANGE UP (ref 11–15.6)
SODIUM SERPL-SCNC: 139 MMOL/L — SIGNIFICANT CHANGE UP (ref 135–145)
TYPE + AB SCN PNL BLD: SIGNIFICANT CHANGE UP
WBC # BLD: 7.1 K/UL — SIGNIFICANT CHANGE UP (ref 4.8–10.8)
WBC # FLD AUTO: 7.1 K/UL — SIGNIFICANT CHANGE UP (ref 4.8–10.8)

## 2019-01-11 PROCEDURE — 93010 ELECTROCARDIOGRAM REPORT: CPT

## 2019-01-11 RX ORDER — SODIUM CHLORIDE 9 MG/ML
3 INJECTION INTRAMUSCULAR; INTRAVENOUS; SUBCUTANEOUS EVERY 8 HOURS
Qty: 0 | Refills: 0 | Status: DISCONTINUED | OUTPATIENT
Start: 2019-01-25 | End: 2019-01-28

## 2019-01-11 RX ORDER — CEFAZOLIN SODIUM 1 G
2000 VIAL (EA) INJECTION ONCE
Qty: 0 | Refills: 0 | Status: DISCONTINUED | OUTPATIENT
Start: 2019-01-25 | End: 2019-01-27

## 2019-01-11 NOTE — PATIENT PROFILE ADULT - NSPROEDALEARNPREF_GEN_A_NUR
written material/pre-op instructions, surgical wash & pain management reviewed pt verbalized understanding/skill demonstration/verbal instruction/individual instruction

## 2019-01-11 NOTE — H&P PST ADULT - ASSESSMENT
68 year old female presents with a hiatal hernia scheduled for an upper endoscopy and robotic assisted hiatal hernia repair on 19.  CAPRINI SCORE [CLOT]    AGE RELATED RISK FACTORS                                                       MOBILITY RELATED FACTORS  [ ] Age 41-60 years                                            (1 Point)                  [ ] Bed rest                                                        (1 Point)  [x ] Age: 61-74 years                                           (2 Points)                 [ ] Plaster cast                                                   (2 Points)  [ ] Age= 75 years                                              (3 Points)                 [ ] Bed bound for more than 72 hours                 (2 Points)    DISEASE RELATED RISK FACTORS                                               GENDER SPECIFIC FACTORS  [ ] Edema in the lower extremities                       (1 Point)                  [ ] Pregnancy                                                     (1 Point)  [ ] Varicose veins                                               (1 Point)                  [ ] Post-partum < 6 weeks                                   (1 Point)             [x ] BMI > 25 Kg/m2                                            (1 Point)                  [ ] Hormonal therapy  or oral contraception          (1 Point)                 [ ] Sepsis (in the previous month)                        (1 Point)                  [ ] History of pregnancy complications                 (1 point)  [ ] Pneumonia or serious lung disease                                               [ ] Unexplained or recurrent                     (1 Point)           (in the previous month)                               (1 Point)  [ ] Abnormal pulmonary function test                     (1 Point)                 SURGERY RELATED RISK FACTORS  [ ] Acute myocardial infarction                              (1 Point)                 [ ]  Section                                             (1 Point)  [ ] Congestive heart failure (in the previous month)  (1 Point)               [x ] Minor surgery                                                  (1 Point)   [ ] Inflammatory bowel disease                             (1 Point)                 [ ] Arthroscopic surgery                                        (2 Points)  [ ] Central venous access                                      (2 Points)                [ ] General surgery lasting more than 45 minutes   (2 Points)       [ ] Stroke (in the previous month)                          (5 Points)               [ ] Elective arthroplasty                                         (5 Points)                                                                                                                                               HEMATOLOGY RELATED FACTORS                                                 TRAUMA RELATED RISK FACTORS  [ ] Prior episodes of VTE                                     (3 Points)                 [ ] Fracture of the hip, pelvis, or leg                       (5 Points)  [ ] Positive family history for VTE                         (3 Points)                 [ ] Acute spinal cord injury (in the previous month)  (5 Points)  [ ] Prothrombin 92348 A                                     (3 Points)                 [ ] Paralysis  (less than 1 month)                             (5 Points)  [ ] Factor V Leiden                                             (3 Points)                  [ ] Multiple Trauma within 1 month                        (5 Points)  [ ] Lupus anticoagulants                                     (3 Points)                                                           [ ] Anticardiolipin antibodies                               (3 Points)                                                       [ ] High homocysteine in the blood                      (3 Points)                                             [ ] Other congenital or acquired thrombophilia      (3 Points)                                                [ ] Heparin induced thrombocytopenia                  (3 Points)                                          Total Score [   4       ]  OPIOID RISK TOOL    ELIZABETH EACH BOX THAT APPLIES AND ADD TOTALS AT THE END    FAMILY HISTORY OF SUBSTANCE ABUSE                 FEMALE         MALE                                                Alcohol                             [ x ]1 pt          [  ]3pts                                               Illegal Drugs                     [  ]2 pts        [  ]3pts                                               Rx Drugs                           [  ]4 pts        [  ]4 pts    PERSONAL HISTORY OF SUBSTANCE ABUSE                                                                                          Alcohol                             [  ]3 pts       [  ]3 pts                                               Illegal Drugs                     [  ]4 pts        [  ]4 pts                                               Rx Drugs                           [  ]5 pts        [  ]5 pts    AGE BETWEEN 16-45 YEARS                                      [  ]1 pt         [  ]1 pt    HISTORY OF PREADOLESCENT   SEXUAL ABUSE                                                             [  ]3 pts        [  ]0pts    PSYCHOLOGICAL DISEASE                     ADD, OCD, Bipolar, Schizophrenia        [  ]2 pts         [  ]2 pts                      Depression                                               [  ]1 pt           [  ]1 pt           SCORING TOTAL   (add numbers and type here)              (1)                                     A score of 3 or lower indicated LOW risk for future opioid abuse  A score of 4 to 7 indicated moderate risk for future opioid abuse  A score of 8 or higher indicates a high risk for opioid abuse

## 2019-01-11 NOTE — H&P PST ADULT - PROBLEM SELECTOR PLAN 1
Labs pending  Medical clearance scheduled for 1/22/19-will request a copy Labs pending.  BP elevated 186/106-As per patient her Carvedilol was increased as of yesterday to 25mg po BID by cardiologist. She will  prescription today.   Medical clearance scheduled for 1/22/19-will request a copy

## 2019-01-11 NOTE — H&P PST ADULT - PROBLEM SELECTOR PLAN 2
HgbA1C pending  Accucheck ordered for day of procedure  Patient to make an appt with endocrinologist for consult regarding management of insulin pre-operatively.

## 2019-01-11 NOTE — H&P PST ADULT - HISTORY OF PRESENT ILLNESS
68 year old female with a history of breast cancer s/p b/l mastectomy and reconstruction, hypertension, hyperlipidemia, IDDM, hypothyroidism, GERD, CVA X2, and CAD s/p CABG X 4 and cardiac stent placement presents with a hiatal hernia scheduled for an upper endoscopy, robotic assisted hiatal hernia repair with fundoplication on 1/25/19.

## 2019-01-11 NOTE — H&P PST ADULT - PMH
Breast cancer    Coronary artery disease    CVA (cerebral vascular accident)    Diabetes mellitus    Glaucoma    Hiatal hernia    HTN (hypertension)    Hypercholesterolemia    Hypothyroid

## 2019-01-24 ENCOUNTER — TRANSCRIPTION ENCOUNTER (OUTPATIENT)
Age: 69
End: 2019-01-24

## 2019-01-25 ENCOUNTER — APPOINTMENT (OUTPATIENT)
Dept: THORACIC SURGERY | Facility: HOSPITAL | Age: 69
End: 2019-01-25
Payer: MEDICARE

## 2019-01-25 ENCOUNTER — INPATIENT (INPATIENT)
Facility: HOSPITAL | Age: 69
LOS: 2 days | Discharge: HOME | DRG: 328 | End: 2019-01-28
Attending: THORACIC SURGERY (CARDIOTHORACIC VASCULAR SURGERY) | Admitting: THORACIC SURGERY (CARDIOTHORACIC VASCULAR SURGERY)
Payer: MEDICARE

## 2019-01-25 ENCOUNTER — RESULT REVIEW (OUTPATIENT)
Age: 69
End: 2019-01-25

## 2019-01-25 VITALS
HEIGHT: 62 IN | OXYGEN SATURATION: 97 % | RESPIRATION RATE: 16 BRPM | HEART RATE: 57 BPM | DIASTOLIC BLOOD PRESSURE: 64 MMHG | WEIGHT: 198.2 LBS | TEMPERATURE: 97 F | SYSTOLIC BLOOD PRESSURE: 132 MMHG

## 2019-01-25 DIAGNOSIS — Z98.89 OTHER SPECIFIED POSTPROCEDURAL STATES: Chronic | ICD-10-CM

## 2019-01-25 DIAGNOSIS — Z95.1 PRESENCE OF AORTOCORONARY BYPASS GRAFT: Chronic | ICD-10-CM

## 2019-01-25 DIAGNOSIS — K44.9 DIAPHRAGMATIC HERNIA WITHOUT OBSTRUCTION OR GANGRENE: ICD-10-CM

## 2019-01-25 DIAGNOSIS — Z90.10 ACQUIRED ABSENCE OF UNSPECIFIED BREAST AND NIPPLE: Chronic | ICD-10-CM

## 2019-01-25 DIAGNOSIS — Z01.818 ENCOUNTER FOR OTHER PREPROCEDURAL EXAMINATION: ICD-10-CM

## 2019-01-25 LAB
ABO RH CONFIRMATION: SIGNIFICANT CHANGE UP
ANION GAP SERPL CALC-SCNC: 13 MMOL/L — SIGNIFICANT CHANGE UP (ref 5–17)
BUN SERPL-MCNC: 20 MG/DL — SIGNIFICANT CHANGE UP (ref 8–20)
CALCIUM SERPL-MCNC: 8.6 MG/DL — SIGNIFICANT CHANGE UP (ref 8.6–10.2)
CHLORIDE SERPL-SCNC: 107 MMOL/L — SIGNIFICANT CHANGE UP (ref 98–107)
CO2 SERPL-SCNC: 19 MMOL/L — LOW (ref 22–29)
CREAT SERPL-MCNC: 0.99 MG/DL — SIGNIFICANT CHANGE UP (ref 0.5–1.3)
EOSINOPHIL NFR BLD AUTO: 1 % — SIGNIFICANT CHANGE UP (ref 0–5)
GLUCOSE BLDC GLUCOMTR-MCNC: 126 MG/DL — HIGH (ref 70–99)
GLUCOSE SERPL-MCNC: 181 MG/DL — HIGH (ref 70–115)
HCT VFR BLD CALC: 35.2 % — LOW (ref 37–47)
HGB BLD-MCNC: 11.4 G/DL — LOW (ref 12–16)
LYMPHOCYTES # BLD AUTO: 8 % — LOW (ref 20–55)
MCHC RBC-ENTMCNC: 28 PG — SIGNIFICANT CHANGE UP (ref 27–31)
MCHC RBC-ENTMCNC: 32.4 G/DL — SIGNIFICANT CHANGE UP (ref 32–36)
MCV RBC AUTO: 86.5 FL — SIGNIFICANT CHANGE UP (ref 81–99)
MONOCYTES NFR BLD AUTO: 1 % — LOW (ref 3–10)
NEUTROPHILS NFR BLD AUTO: 90 % — HIGH (ref 37–73)
PLAT MORPH BLD: NORMAL — SIGNIFICANT CHANGE UP
PLATELET # BLD AUTO: 116 K/UL — LOW (ref 150–400)
POTASSIUM SERPL-MCNC: 4.5 MMOL/L — SIGNIFICANT CHANGE UP (ref 3.5–5.3)
POTASSIUM SERPL-SCNC: 4.5 MMOL/L — SIGNIFICANT CHANGE UP (ref 3.5–5.3)
RBC # BLD: 4.07 M/UL — LOW (ref 4.4–5.2)
RBC # FLD: 14.2 % — SIGNIFICANT CHANGE UP (ref 11–15.6)
RBC BLD AUTO: NORMAL — SIGNIFICANT CHANGE UP
SODIUM SERPL-SCNC: 139 MMOL/L — SIGNIFICANT CHANGE UP (ref 135–145)
WBC # BLD: 12.1 K/UL — HIGH (ref 4.8–10.8)
WBC # FLD AUTO: 12.1 K/UL — HIGH (ref 4.8–10.8)

## 2019-01-25 PROCEDURE — 43332 TRANSAB ESOPH HIAT HERN RPR: CPT

## 2019-01-25 PROCEDURE — 85027 COMPLETE CBC AUTOMATED: CPT

## 2019-01-25 PROCEDURE — 86901 BLOOD TYPING SEROLOGIC RH(D): CPT

## 2019-01-25 PROCEDURE — 88302 TISSUE EXAM BY PATHOLOGIST: CPT | Mod: 26

## 2019-01-25 PROCEDURE — 83036 HEMOGLOBIN GLYCOSYLATED A1C: CPT

## 2019-01-25 PROCEDURE — 36415 COLL VENOUS BLD VENIPUNCTURE: CPT

## 2019-01-25 PROCEDURE — 80048 BASIC METABOLIC PNL TOTAL CA: CPT

## 2019-01-25 PROCEDURE — 71045 X-RAY EXAM CHEST 1 VIEW: CPT | Mod: 26

## 2019-01-25 PROCEDURE — G0463: CPT

## 2019-01-25 PROCEDURE — 43332 TRANSAB ESOPH HIAT HERN RPR: CPT | Mod: AS

## 2019-01-25 PROCEDURE — 86900 BLOOD TYPING SEROLOGIC ABO: CPT

## 2019-01-25 PROCEDURE — 86850 RBC ANTIBODY SCREEN: CPT

## 2019-01-25 PROCEDURE — 93005 ELECTROCARDIOGRAM TRACING: CPT

## 2019-01-25 PROCEDURE — 86923 COMPATIBILITY TEST ELECTRIC: CPT

## 2019-01-25 RX ORDER — HYDROMORPHONE HYDROCHLORIDE 2 MG/ML
0.5 INJECTION INTRAMUSCULAR; INTRAVENOUS; SUBCUTANEOUS
Qty: 0 | Refills: 0 | Status: DISCONTINUED | OUTPATIENT
Start: 2019-01-25 | End: 2019-01-25

## 2019-01-25 RX ORDER — OXYCODONE AND ACETAMINOPHEN 5; 325 MG/1; MG/1
2 TABLET ORAL EVERY 4 HOURS
Qty: 0 | Refills: 0 | Status: DISCONTINUED | OUTPATIENT
Start: 2019-01-25 | End: 2019-01-26

## 2019-01-25 RX ORDER — FENTANYL CITRATE 50 UG/ML
25 INJECTION INTRAVENOUS
Qty: 0 | Refills: 0 | Status: DISCONTINUED | OUTPATIENT
Start: 2019-01-25 | End: 2019-01-25

## 2019-01-25 RX ORDER — HYDROMORPHONE HYDROCHLORIDE 2 MG/ML
0.5 INJECTION INTRAMUSCULAR; INTRAVENOUS; SUBCUTANEOUS EVERY 4 HOURS
Qty: 0 | Refills: 0 | Status: DISCONTINUED | OUTPATIENT
Start: 2019-01-25 | End: 2019-01-27

## 2019-01-25 RX ORDER — HYDROMORPHONE HYDROCHLORIDE 2 MG/ML
0.5 INJECTION INTRAMUSCULAR; INTRAVENOUS; SUBCUTANEOUS
Qty: 0 | Refills: 0 | Status: DISCONTINUED | OUTPATIENT
Start: 2019-01-25 | End: 2019-01-26

## 2019-01-25 RX ORDER — SODIUM CHLORIDE 9 MG/ML
1000 INJECTION INTRAMUSCULAR; INTRAVENOUS; SUBCUTANEOUS
Qty: 0 | Refills: 0 | Status: DISCONTINUED | OUTPATIENT
Start: 2019-01-25 | End: 2019-01-25

## 2019-01-25 RX ORDER — ONDANSETRON 8 MG/1
4 TABLET, FILM COATED ORAL ONCE
Qty: 0 | Refills: 0 | Status: COMPLETED | OUTPATIENT
Start: 2019-01-25 | End: 2019-01-25

## 2019-01-25 RX ORDER — DOCUSATE SODIUM 100 MG
100 CAPSULE ORAL THREE TIMES A DAY
Qty: 0 | Refills: 0 | Status: DISCONTINUED | OUTPATIENT
Start: 2019-01-25 | End: 2019-01-26

## 2019-01-25 RX ORDER — OXYCODONE AND ACETAMINOPHEN 5; 325 MG/1; MG/1
1 TABLET ORAL EVERY 4 HOURS
Qty: 0 | Refills: 0 | Status: DISCONTINUED | OUTPATIENT
Start: 2019-01-25 | End: 2019-01-26

## 2019-01-25 RX ADMIN — FENTANYL CITRATE 25 MICROGRAM(S): 50 INJECTION INTRAVENOUS at 20:41

## 2019-01-25 RX ADMIN — FENTANYL CITRATE 25 MICROGRAM(S): 50 INJECTION INTRAVENOUS at 20:35

## 2019-01-25 RX ADMIN — HYDROMORPHONE HYDROCHLORIDE 0.5 MILLIGRAM(S): 2 INJECTION INTRAMUSCULAR; INTRAVENOUS; SUBCUTANEOUS at 21:41

## 2019-01-25 RX ADMIN — HYDROMORPHONE HYDROCHLORIDE 0.5 MILLIGRAM(S): 2 INJECTION INTRAMUSCULAR; INTRAVENOUS; SUBCUTANEOUS at 21:11

## 2019-01-25 RX ADMIN — FENTANYL CITRATE 25 MICROGRAM(S): 50 INJECTION INTRAVENOUS at 20:16

## 2019-01-25 RX ADMIN — HYDROMORPHONE HYDROCHLORIDE 0.5 MILLIGRAM(S): 2 INJECTION INTRAMUSCULAR; INTRAVENOUS; SUBCUTANEOUS at 20:41

## 2019-01-25 RX ADMIN — FENTANYL CITRATE 25 MICROGRAM(S): 50 INJECTION INTRAVENOUS at 20:11

## 2019-01-25 RX ADMIN — ONDANSETRON 4 MILLIGRAM(S): 8 TABLET, FILM COATED ORAL at 20:11

## 2019-01-25 RX ADMIN — FENTANYL CITRATE 25 MICROGRAM(S): 50 INJECTION INTRAVENOUS at 20:25

## 2019-01-25 RX ADMIN — SODIUM CHLORIDE 125 MILLILITER(S): 9 INJECTION INTRAMUSCULAR; INTRAVENOUS; SUBCUTANEOUS at 21:03

## 2019-01-25 RX ADMIN — SODIUM CHLORIDE 3 MILLILITER(S): 9 INJECTION INTRAMUSCULAR; INTRAVENOUS; SUBCUTANEOUS at 21:03

## 2019-01-25 RX ADMIN — FENTANYL CITRATE 25 MICROGRAM(S): 50 INJECTION INTRAVENOUS at 20:15

## 2019-01-25 NOTE — BRIEF OPERATIVE NOTE - PROCEDURE
<<-----Click on this checkbox to enter Procedure Robot-assisted laparoscopic repair of hiatal hernia  01/25/2019    Active  [id number]

## 2019-01-26 DIAGNOSIS — I10 ESSENTIAL (PRIMARY) HYPERTENSION: ICD-10-CM

## 2019-01-26 DIAGNOSIS — E78.00 PURE HYPERCHOLESTEROLEMIA, UNSPECIFIED: ICD-10-CM

## 2019-01-26 DIAGNOSIS — E03.9 HYPOTHYROIDISM, UNSPECIFIED: ICD-10-CM

## 2019-01-26 LAB
ANION GAP SERPL CALC-SCNC: 12 MMOL/L — SIGNIFICANT CHANGE UP (ref 5–17)
APTT BLD: 34.7 SEC — SIGNIFICANT CHANGE UP (ref 27.5–36.3)
BUN SERPL-MCNC: 17 MG/DL — SIGNIFICANT CHANGE UP (ref 8–20)
CALCIUM SERPL-MCNC: 7.5 MG/DL — LOW (ref 8.6–10.2)
CHLORIDE SERPL-SCNC: 113 MMOL/L — HIGH (ref 98–107)
CO2 SERPL-SCNC: 16 MMOL/L — LOW (ref 22–29)
CREAT SERPL-MCNC: 0.85 MG/DL — SIGNIFICANT CHANGE UP (ref 0.5–1.3)
GLUCOSE BLDC GLUCOMTR-MCNC: 241 MG/DL — HIGH (ref 70–99)
GLUCOSE BLDC GLUCOMTR-MCNC: 321 MG/DL — HIGH (ref 70–99)
GLUCOSE SERPL-MCNC: 154 MG/DL — HIGH (ref 70–115)
POTASSIUM SERPL-MCNC: 4.1 MMOL/L — SIGNIFICANT CHANGE UP (ref 3.5–5.3)
POTASSIUM SERPL-SCNC: 4.1 MMOL/L — SIGNIFICANT CHANGE UP (ref 3.5–5.3)
SODIUM SERPL-SCNC: 141 MMOL/L — SIGNIFICANT CHANGE UP (ref 135–145)

## 2019-01-26 RX ORDER — DEXTROSE 50 % IN WATER 50 %
15 SYRINGE (ML) INTRAVENOUS ONCE
Qty: 0 | Refills: 0 | Status: DISCONTINUED | OUTPATIENT
Start: 2019-01-26 | End: 2019-01-28

## 2019-01-26 RX ORDER — DEXTROSE 50 % IN WATER 50 %
12.5 SYRINGE (ML) INTRAVENOUS ONCE
Qty: 0 | Refills: 0 | Status: DISCONTINUED | OUTPATIENT
Start: 2019-01-26 | End: 2019-01-28

## 2019-01-26 RX ORDER — HYDROMORPHONE HYDROCHLORIDE 2 MG/ML
0.5 INJECTION INTRAMUSCULAR; INTRAVENOUS; SUBCUTANEOUS
Qty: 0 | Refills: 0 | Status: DISCONTINUED | OUTPATIENT
Start: 2019-01-26 | End: 2019-01-26

## 2019-01-26 RX ORDER — INSULIN LISPRO 100/ML
VIAL (ML) SUBCUTANEOUS
Qty: 0 | Refills: 0 | Status: DISCONTINUED | OUTPATIENT
Start: 2019-01-26 | End: 2019-01-28

## 2019-01-26 RX ORDER — ENOXAPARIN SODIUM 100 MG/ML
40 INJECTION SUBCUTANEOUS DAILY
Qty: 0 | Refills: 0 | Status: DISCONTINUED | OUTPATIENT
Start: 2019-01-26 | End: 2019-01-28

## 2019-01-26 RX ORDER — DEXTROSE 50 % IN WATER 50 %
25 SYRINGE (ML) INTRAVENOUS ONCE
Qty: 0 | Refills: 0 | Status: DISCONTINUED | OUTPATIENT
Start: 2019-01-26 | End: 2019-01-28

## 2019-01-26 RX ORDER — ACETAMINOPHEN 500 MG
1000 TABLET ORAL ONCE
Qty: 0 | Refills: 0 | Status: COMPLETED | OUTPATIENT
Start: 2019-01-26 | End: 2019-01-26

## 2019-01-26 RX ORDER — FENTANYL CITRATE 50 UG/ML
25 INJECTION INTRAVENOUS
Qty: 0 | Refills: 0 | Status: DISCONTINUED | OUTPATIENT
Start: 2019-01-26 | End: 2019-01-26

## 2019-01-26 RX ORDER — GLUCAGON INJECTION, SOLUTION 0.5 MG/.1ML
1 INJECTION, SOLUTION SUBCUTANEOUS ONCE
Qty: 0 | Refills: 0 | Status: DISCONTINUED | OUTPATIENT
Start: 2019-01-26 | End: 2019-01-28

## 2019-01-26 RX ORDER — METOPROLOL TARTRATE 50 MG
5 TABLET ORAL EVERY 8 HOURS
Qty: 0 | Refills: 0 | Status: DISCONTINUED | OUTPATIENT
Start: 2019-01-26 | End: 2019-01-27

## 2019-01-26 RX ORDER — INSULIN LISPRO 100/ML
4 VIAL (ML) SUBCUTANEOUS ONCE
Qty: 0 | Refills: 0 | Status: COMPLETED | OUTPATIENT
Start: 2019-01-26 | End: 2019-01-26

## 2019-01-26 RX ORDER — SODIUM CHLORIDE 9 MG/ML
1000 INJECTION, SOLUTION INTRAVENOUS
Qty: 0 | Refills: 0 | Status: DISCONTINUED | OUTPATIENT
Start: 2019-01-26 | End: 2019-01-26

## 2019-01-26 RX ORDER — PANTOPRAZOLE SODIUM 20 MG/1
40 TABLET, DELAYED RELEASE ORAL DAILY
Qty: 0 | Refills: 0 | Status: DISCONTINUED | OUTPATIENT
Start: 2019-01-26 | End: 2019-01-27

## 2019-01-26 RX ORDER — LEVOTHYROXINE SODIUM 125 MCG
75 TABLET ORAL AT BEDTIME
Qty: 0 | Refills: 0 | Status: DISCONTINUED | OUTPATIENT
Start: 2019-01-26 | End: 2019-01-27

## 2019-01-26 RX ORDER — SODIUM CHLORIDE 9 MG/ML
1000 INJECTION INTRAMUSCULAR; INTRAVENOUS; SUBCUTANEOUS
Qty: 0 | Refills: 0 | Status: DISCONTINUED | OUTPATIENT
Start: 2019-01-26 | End: 2019-01-26

## 2019-01-26 RX ADMIN — SODIUM CHLORIDE 3 MILLILITER(S): 9 INJECTION INTRAMUSCULAR; INTRAVENOUS; SUBCUTANEOUS at 21:23

## 2019-01-26 RX ADMIN — SODIUM CHLORIDE 3 MILLILITER(S): 9 INJECTION INTRAMUSCULAR; INTRAVENOUS; SUBCUTANEOUS at 05:16

## 2019-01-26 RX ADMIN — PANTOPRAZOLE SODIUM 40 MILLIGRAM(S): 20 TABLET, DELAYED RELEASE ORAL at 12:56

## 2019-01-26 RX ADMIN — SODIUM CHLORIDE 125 MILLILITER(S): 9 INJECTION INTRAMUSCULAR; INTRAVENOUS; SUBCUTANEOUS at 09:10

## 2019-01-26 RX ADMIN — Medication 75 MICROGRAM(S): at 21:28

## 2019-01-26 RX ADMIN — Medication 5 MILLIGRAM(S): at 14:03

## 2019-01-26 RX ADMIN — OXYCODONE AND ACETAMINOPHEN 2 TABLET(S): 5; 325 TABLET ORAL at 10:10

## 2019-01-26 RX ADMIN — HYDROMORPHONE HYDROCHLORIDE 0.5 MILLIGRAM(S): 2 INJECTION INTRAMUSCULAR; INTRAVENOUS; SUBCUTANEOUS at 21:26

## 2019-01-26 RX ADMIN — SODIUM CHLORIDE 3 MILLILITER(S): 9 INJECTION INTRAMUSCULAR; INTRAVENOUS; SUBCUTANEOUS at 12:41

## 2019-01-26 RX ADMIN — OXYCODONE AND ACETAMINOPHEN 2 TABLET(S): 5; 325 TABLET ORAL at 09:10

## 2019-01-26 RX ADMIN — ENOXAPARIN SODIUM 40 MILLIGRAM(S): 100 INJECTION SUBCUTANEOUS at 21:28

## 2019-01-26 RX ADMIN — Medication 4 UNIT(S): at 12:40

## 2019-01-26 RX ADMIN — HYDROMORPHONE HYDROCHLORIDE 0.5 MILLIGRAM(S): 2 INJECTION INTRAMUSCULAR; INTRAVENOUS; SUBCUTANEOUS at 20:08

## 2019-01-26 RX ADMIN — Medication 5 MILLIGRAM(S): at 21:28

## 2019-01-26 RX ADMIN — Medication 8: at 17:21

## 2019-01-26 RX ADMIN — HYDROMORPHONE HYDROCHLORIDE 0.5 MILLIGRAM(S): 2 INJECTION INTRAMUSCULAR; INTRAVENOUS; SUBCUTANEOUS at 02:58

## 2019-01-26 RX ADMIN — Medication 400 MILLIGRAM(S): at 14:03

## 2019-01-26 RX ADMIN — Medication 100 MILLIGRAM(S): at 05:16

## 2019-01-26 NOTE — PROGRESS NOTE ADULT - PROBLEM SELECTOR PLAN 1
s/p robotic hiatal hernia repair   No post op esophagram done today.  As per Radiology , unable to do over the weekend.  DR Perez aware.  Said it was fine to start Full liquid diet.   OOB/Ambulate   Lovenox for DVTP

## 2019-01-26 NOTE — PROGRESS NOTE ADULT - SUBJECTIVE AND OBJECTIVE BOX
Subjective: PT in bed resting no complaints     T(C): 37.1 (01-26-19 @ 10:20), Max: 37.1 (01-26-19 @ 05:14)  HR: 78 (01-26-19 @ 10:20) (62 - 86)  BP: 134/71 (01-26-19 @ 10:20) (132/65 - 149/73)  ABP: 158/77 (01-25-19 @ 19:45) (149/71 - 162/78)  ABP(mean): 112 (01-25-19 @ 19:45) (103 - 113)  RR: 18 (01-26-19 @ 10:20) (14 - 21)  SpO2: 92% (01-26-19 @ 10:20) (92% - 100%) 2 L NC   Tele: SR             01-26    141  |  113<H>  |  17.0  ----------------------------<  154<H>  4.1   |  16.0<L>  |  0.85    Ca    7.5<L>      26 Jan 2019 01:35                                 11.4   12.1  )-----------( 116      ( 25 Jan 2019 19:39 )             35.2                 CAPILLARY BLOOD GLUCOSE               CXR:  < from: Xray Chest 1 View- PORTABLE-Urgent (01.25.19 @ 19:47) >    COMPARISON: No previous examinations are available for review.    FINDINGS:   The lungs  show ill-defined LEFT diaphragmatic contour which may indicate   LEFT lower lobe infiltrate and/or effusion. There is elevation of the   RIGHT hemidiaphragm. Posterior lung bases cannot be assessed due to   elevated diaphragms. Lateral radiograph recommended if clinically   warranted.  .         *The  heart is enlarged in transverse diameter. No hilar mass. Trachea   midline.  Status post coronary artery bypass graft procedure.  . .   . Visualized osseous structures are intact.        IMPRESSION:   Cardiomegaly. Status post coronary artery bypass graft   procedure. Suspect LEFT lower lobe infiltrate and/or effusion..          < end of copied text >          Assessment  Neuro:  alert awake NAD   Pulm: clear b/l   CV: RR S1 S2   Abd:  soft NT ND + BS   Extremities: no edema b/l         Assessment:  68yFemale    with PAST MEDICAL & SURGICAL HISTORY:  Hiatal hernia  Breast cancer  Glaucoma  CVA (cerebral vascular accident)  Diabetes mellitus  Hypothyroid  Hypercholesterolemia  Coronary artery disease  HTN (hypertension)  S/P tonsillectomy  S/P appendectomy  S/P cardiac cath: with stents  S/P CABG x 4  S/P breast reconstruction, bilateral  S/P mastectomy

## 2019-01-27 LAB
ANION GAP SERPL CALC-SCNC: 14 MMOL/L — SIGNIFICANT CHANGE UP (ref 5–17)
BUN SERPL-MCNC: 22 MG/DL — HIGH (ref 8–20)
CALCIUM SERPL-MCNC: 9.2 MG/DL — SIGNIFICANT CHANGE UP (ref 8.6–10.2)
CHLORIDE SERPL-SCNC: 103 MMOL/L — SIGNIFICANT CHANGE UP (ref 98–107)
CO2 SERPL-SCNC: 20 MMOL/L — LOW (ref 22–29)
CREAT SERPL-MCNC: 1.01 MG/DL — SIGNIFICANT CHANGE UP (ref 0.5–1.3)
GLUCOSE BLDC GLUCOMTR-MCNC: 243 MG/DL — HIGH (ref 70–99)
GLUCOSE BLDC GLUCOMTR-MCNC: 298 MG/DL — HIGH (ref 70–99)
GLUCOSE BLDC GLUCOMTR-MCNC: 313 MG/DL — HIGH (ref 70–99)
GLUCOSE BLDC GLUCOMTR-MCNC: 357 MG/DL — HIGH (ref 70–99)
GLUCOSE SERPL-MCNC: 326 MG/DL — HIGH (ref 70–115)
POTASSIUM SERPL-MCNC: 4.9 MMOL/L — SIGNIFICANT CHANGE UP (ref 3.5–5.3)
POTASSIUM SERPL-SCNC: 4.9 MMOL/L — SIGNIFICANT CHANGE UP (ref 3.5–5.3)
SODIUM SERPL-SCNC: 137 MMOL/L — SIGNIFICANT CHANGE UP (ref 135–145)

## 2019-01-27 PROCEDURE — 71045 X-RAY EXAM CHEST 1 VIEW: CPT | Mod: 26

## 2019-01-27 RX ORDER — OXYCODONE AND ACETAMINOPHEN 5; 325 MG/1; MG/1
1 TABLET ORAL EVERY 4 HOURS
Qty: 0 | Refills: 0 | Status: DISCONTINUED | OUTPATIENT
Start: 2019-01-27 | End: 2019-01-28

## 2019-01-27 RX ORDER — INSULIN LISPRO 100/ML
VIAL (ML) SUBCUTANEOUS AT BEDTIME
Qty: 0 | Refills: 0 | Status: DISCONTINUED | OUTPATIENT
Start: 2019-01-27 | End: 2019-01-28

## 2019-01-27 RX ORDER — LEVOTHYROXINE SODIUM 125 MCG
88 TABLET ORAL DAILY
Qty: 0 | Refills: 0 | Status: DISCONTINUED | OUTPATIENT
Start: 2019-01-27 | End: 2019-01-28

## 2019-01-27 RX ORDER — PANTOPRAZOLE SODIUM 20 MG/1
40 TABLET, DELAYED RELEASE ORAL
Qty: 0 | Refills: 0 | Status: DISCONTINUED | OUTPATIENT
Start: 2019-01-27 | End: 2019-01-28

## 2019-01-27 RX ORDER — METOPROLOL TARTRATE 50 MG
25 TABLET ORAL
Qty: 0 | Refills: 0 | Status: DISCONTINUED | OUTPATIENT
Start: 2019-01-27 | End: 2019-01-28

## 2019-01-27 RX ADMIN — Medication 5 MILLIGRAM(S): at 05:15

## 2019-01-27 RX ADMIN — OXYCODONE AND ACETAMINOPHEN 1 TABLET(S): 5; 325 TABLET ORAL at 21:40

## 2019-01-27 RX ADMIN — Medication 6: at 08:32

## 2019-01-27 RX ADMIN — PANTOPRAZOLE SODIUM 40 MILLIGRAM(S): 20 TABLET, DELAYED RELEASE ORAL at 12:36

## 2019-01-27 RX ADMIN — Medication 5 MILLIGRAM(S): at 10:43

## 2019-01-27 RX ADMIN — SODIUM CHLORIDE 3 MILLILITER(S): 9 INJECTION INTRAMUSCULAR; INTRAVENOUS; SUBCUTANEOUS at 14:15

## 2019-01-27 RX ADMIN — Medication 10: at 12:35

## 2019-01-27 RX ADMIN — Medication 88 MICROGRAM(S): at 17:12

## 2019-01-27 RX ADMIN — OXYCODONE AND ACETAMINOPHEN 1 TABLET(S): 5; 325 TABLET ORAL at 17:45

## 2019-01-27 RX ADMIN — Medication 8: at 17:13

## 2019-01-27 RX ADMIN — OXYCODONE AND ACETAMINOPHEN 1 TABLET(S): 5; 325 TABLET ORAL at 13:02

## 2019-01-27 RX ADMIN — ENOXAPARIN SODIUM 40 MILLIGRAM(S): 100 INJECTION SUBCUTANEOUS at 21:40

## 2019-01-27 RX ADMIN — SODIUM CHLORIDE 3 MILLILITER(S): 9 INJECTION INTRAMUSCULAR; INTRAVENOUS; SUBCUTANEOUS at 21:35

## 2019-01-27 RX ADMIN — OXYCODONE AND ACETAMINOPHEN 1 TABLET(S): 5; 325 TABLET ORAL at 22:40

## 2019-01-27 RX ADMIN — SODIUM CHLORIDE 3 MILLILITER(S): 9 INJECTION INTRAMUSCULAR; INTRAVENOUS; SUBCUTANEOUS at 05:05

## 2019-01-27 RX ADMIN — Medication 25 MILLIGRAM(S): at 17:11

## 2019-01-27 RX ADMIN — OXYCODONE AND ACETAMINOPHEN 1 TABLET(S): 5; 325 TABLET ORAL at 12:34

## 2019-01-27 RX ADMIN — OXYCODONE AND ACETAMINOPHEN 1 TABLET(S): 5; 325 TABLET ORAL at 17:11

## 2019-01-27 NOTE — PROGRESS NOTE ADULT - SUBJECTIVE AND OBJECTIVE BOX
Subjective: "Want to go home maybe later"  OOB chair      V/S  T(C): 37.1 (01-27-19 @ 05:10), Max: 37.1 (01-26-19 @ 10:20)  HR: 87 (01-27-19 @ 05:10) (70 - 87)  BP: 140/86 (01-27-19 @ 05:10) (120/64 - 140/86)  RR: 18 (01-27-19 @ 05:10) (18 - 18)  SpO2: 95% (01-27-19 @ 05:10) (91% - 95%)                                               Tele:  SR  70s        MEDICATIONS  (STANDING):  bisacodyl 5 milliGRAM(s) Oral once  ceFAZolin   IVPB 2000 milliGRAM(s) IV Intermittent once  dextrose 50% Injectable 12.5 Gram(s) IV Push once  dextrose 50% Injectable 25 Gram(s) IV Push once  dextrose 50% Injectable 25 Gram(s) IV Push once  enoxaparin Injectable 40 milliGRAM(s) SubCutaneous daily  insulin lispro (HumaLOG) corrective regimen sliding scale   SubCutaneous three times a day before meals  levothyroxine Injectable 75 MICROGram(s) IV Push at bedtime  metoprolol tartrate Injectable 5 milliGRAM(s) IV Push every 8 hours  pantoprazole  Injectable 40 milliGRAM(s) IV Push daily  sodium chloride 0.9% lock flush 3 milliLiter(s) IV Push every 8 hours      01-27    137  |  103  |  22.0<H>  ----------------------------<  326<H>  4.9   |  20.0<L>  |  1.01    Ca    9.2      27 Jan 2019 06:03                                 11.4   12.1  )-----------( 116      ( 25 Jan 2019 19:39 )             35.2        PTT - ( 26 Jan 2019 12:56 )  PTT:34.7 sec         CAPILLARY BLOOD GLUCOSE      POCT Blood Glucose.: 298 mg/dL (27 Jan 2019 08:11)  POCT Blood Glucose.: 321 mg/dL (26 Jan 2019 17:02)  POCT Blood Glucose.: 241 mg/dL (26 Jan 2019 12:27)           Physical Exam:    Neuro: alert, no deficits    Pulm: diminished bilateral breath sounds, Supplemental O2 in use    CV: S1 S2  RRR    Abd: obese, distended, incisional tenderness, hypoactive BS...no BM, minimal flatus reported     Extremities: no edema,     Incision: steri strips across abdomen              PAST MEDICAL & SURGICAL HISTORY:  Hiatal hernia  Breast cancer  Glaucoma  CVA (cerebral vascular accident)  Diabetes mellitus  Hypothyroid  Hypercholesterolemia  Coronary artery disease  HTN (hypertension)  S/P tonsillectomy  S/P appendectomy  S/P cardiac cath: with stents  S/P CABG x 4  S/P breast reconstruction, bilateral  S/P mastectomy

## 2019-01-27 NOTE — PROGRESS NOTE ADULT - SUBJECTIVE AND OBJECTIVE BOX
Anesthesia Post Op Note  Day 2:    Vital Signs Last 24 Hrs  T(C): 37.4 (27 Jan 2019 11:20), Max: 37.4 (27 Jan 2019 11:20)  T(F): 99.3 (27 Jan 2019 11:20), Max: 99.3 (27 Jan 2019 11:20)  HR: 78 (27 Jan 2019 11:20) (70 - 87)  BP: 148/80 (27 Jan 2019 11:20) (120/64 - 148/80)  BP(mean): --  RR: 18 (27 Jan 2019 11:20) (18 - 18)  SpO2: 92% (27 Jan 2019 11:20) (91% - 95%)                        11.4   12.1  )-----------( 116      ( 25 Jan 2019 19:39 )             35.2     01-27    137  |  103  |  22.0<H>  ----------------------------<  326<H>  4.9   |  20.0<L>  |  1.01    Ca    9.2      27 Jan 2019 06:03      PTT - ( 26 Jan 2019 12:56 )  PTT:34.7 sec  Pt. seen, doing well. VSS. No nausea, pain well-controlled.   No anesthesia-related questions/concerns.

## 2019-01-28 ENCOUNTER — TRANSCRIPTION ENCOUNTER (OUTPATIENT)
Age: 69
End: 2019-01-28

## 2019-01-28 VITALS
TEMPERATURE: 98 F | SYSTOLIC BLOOD PRESSURE: 140 MMHG | RESPIRATION RATE: 18 BRPM | OXYGEN SATURATION: 100 % | DIASTOLIC BLOOD PRESSURE: 72 MMHG | HEART RATE: 66 BPM

## 2019-01-28 LAB
ALBUMIN SERPL ELPH-MCNC: 3.4 G/DL — SIGNIFICANT CHANGE UP (ref 3.3–5.2)
ALP SERPL-CCNC: 103 U/L — SIGNIFICANT CHANGE UP (ref 40–120)
ALT FLD-CCNC: 34 U/L — HIGH
ANION GAP SERPL CALC-SCNC: 9 MMOL/L — SIGNIFICANT CHANGE UP (ref 5–17)
AST SERPL-CCNC: 24 U/L — SIGNIFICANT CHANGE UP
BILIRUB SERPL-MCNC: 0.3 MG/DL — LOW (ref 0.4–2)
BUN SERPL-MCNC: 16 MG/DL — SIGNIFICANT CHANGE UP (ref 8–20)
CALCIUM SERPL-MCNC: 9 MG/DL — SIGNIFICANT CHANGE UP (ref 8.6–10.2)
CHLORIDE SERPL-SCNC: 100 MMOL/L — SIGNIFICANT CHANGE UP (ref 98–107)
CO2 SERPL-SCNC: 26 MMOL/L — SIGNIFICANT CHANGE UP (ref 22–29)
CREAT SERPL-MCNC: 0.89 MG/DL — SIGNIFICANT CHANGE UP (ref 0.5–1.3)
GLUCOSE BLDC GLUCOMTR-MCNC: 279 MG/DL — HIGH (ref 70–99)
GLUCOSE BLDC GLUCOMTR-MCNC: 289 MG/DL — HIGH (ref 70–99)
GLUCOSE SERPL-MCNC: 349 MG/DL — HIGH (ref 70–115)
HCT VFR BLD CALC: 38.1 % — SIGNIFICANT CHANGE UP (ref 37–47)
HGB BLD-MCNC: 12.2 G/DL — SIGNIFICANT CHANGE UP (ref 12–16)
MCHC RBC-ENTMCNC: 27.8 PG — SIGNIFICANT CHANGE UP (ref 27–31)
MCHC RBC-ENTMCNC: 32 G/DL — SIGNIFICANT CHANGE UP (ref 32–36)
MCV RBC AUTO: 86.8 FL — SIGNIFICANT CHANGE UP (ref 81–99)
PLATELET # BLD AUTO: 127 K/UL — LOW (ref 150–400)
POTASSIUM SERPL-MCNC: 4.1 MMOL/L — SIGNIFICANT CHANGE UP (ref 3.5–5.3)
POTASSIUM SERPL-SCNC: 4.1 MMOL/L — SIGNIFICANT CHANGE UP (ref 3.5–5.3)
PROT SERPL-MCNC: 7.3 G/DL — SIGNIFICANT CHANGE UP (ref 6.6–8.7)
RBC # BLD: 4.39 M/UL — LOW (ref 4.4–5.2)
RBC # FLD: 14.7 % — SIGNIFICANT CHANGE UP (ref 11–15.6)
SODIUM SERPL-SCNC: 135 MMOL/L — SIGNIFICANT CHANGE UP (ref 135–145)
WBC # BLD: 6.8 K/UL — SIGNIFICANT CHANGE UP (ref 4.8–10.8)
WBC # FLD AUTO: 6.8 K/UL — SIGNIFICANT CHANGE UP (ref 4.8–10.8)

## 2019-01-28 PROCEDURE — 71045 X-RAY EXAM CHEST 1 VIEW: CPT

## 2019-01-28 PROCEDURE — 82962 GLUCOSE BLOOD TEST: CPT

## 2019-01-28 PROCEDURE — 85730 THROMBOPLASTIN TIME PARTIAL: CPT

## 2019-01-28 PROCEDURE — 80053 COMPREHEN METABOLIC PANEL: CPT

## 2019-01-28 PROCEDURE — 80048 BASIC METABOLIC PNL TOTAL CA: CPT

## 2019-01-28 PROCEDURE — S2900: CPT

## 2019-01-28 PROCEDURE — 99239 HOSP IP/OBS DSCHRG MGMT >30: CPT | Mod: 24

## 2019-01-28 PROCEDURE — 88302 TISSUE EXAM BY PATHOLOGIST: CPT

## 2019-01-28 PROCEDURE — 36415 COLL VENOUS BLD VENIPUNCTURE: CPT

## 2019-01-28 PROCEDURE — 85027 COMPLETE CBC AUTOMATED: CPT

## 2019-01-28 RX ADMIN — PANTOPRAZOLE SODIUM 40 MILLIGRAM(S): 20 TABLET, DELAYED RELEASE ORAL at 05:30

## 2019-01-28 RX ADMIN — SODIUM CHLORIDE 3 MILLILITER(S): 9 INJECTION INTRAMUSCULAR; INTRAVENOUS; SUBCUTANEOUS at 12:01

## 2019-01-28 RX ADMIN — Medication 88 MICROGRAM(S): at 05:30

## 2019-01-28 RX ADMIN — Medication 6: at 12:28

## 2019-01-28 RX ADMIN — Medication 6: at 09:00

## 2019-01-28 RX ADMIN — SODIUM CHLORIDE 3 MILLILITER(S): 9 INJECTION INTRAMUSCULAR; INTRAVENOUS; SUBCUTANEOUS at 05:29

## 2019-01-28 RX ADMIN — Medication 25 MILLIGRAM(S): at 05:30

## 2019-01-28 NOTE — DISCHARGE NOTE ADULT - OTHER SIGNIFICANT FINDINGS
Physical Exam:    Neuro: alert, no deficits    Pulm: diminished bilateral breath sounds, Supplemental O2 in use    CV: S1 S2  RRR    Abd: obese, distended, incisional tenderness, +  BS.. BM this am     Extremities: no edema,     Incision: steri strips across abdomen

## 2019-01-28 NOTE — DISCHARGE NOTE ADULT - CARE PLAN
Principal Discharge DX:	Hiatal hernia  Goal:	recover from surgery  Assessment and plan of treatment:	Follow up Dr Perez next Thursday> Call Cyndi at  to schedule  Seek medical attention if short of breath, palpitations. pain unrelieved with medication or nausea/vomiting, fever,Do not swim in pool or ocean. Dr Perez will see you in office Thursday Feb 7

## 2019-01-28 NOTE — DISCHARGE NOTE ADULT - PATIENT PORTAL LINK FT
You can access the [email or web address] Portal, offered by [facility], by registering with the following website: [email or web address]

## 2019-01-28 NOTE — DISCHARGE NOTE ADULT - NS AS ACTIVITY OBS
Do not drive or operate machinery/Walking-Outdoors allowed/Stairs allowed/No Heavy lifting/straining/Do not make important decisions/Bathing allowed/Showering allowed/Walking-Indoors allowed

## 2019-01-28 NOTE — DISCHARGE NOTE ADULT - CARE PROVIDER_API CALL
Nabil Perez), Thoracic Surgery  [address] [place] [address]  Phone: [phone]  Fax: [phone]

## 2019-01-28 NOTE — DISCHARGE NOTE ADULT - MEDICATION SUMMARY - MEDICATIONS TO TAKE
I will START or STAY ON the medications listed below when I get home from the hospital:    aspirin 81 mg oral tablet  -- 1 tab(s) by mouth once a day  -- Indication: For aspirin    oxycodone-acetaminophen 5 mg-325 mg oral tablet  -- 1 tab(s) by mouth every 4 hours, As needed, Moderate Pain (4 - 6) MDD:4 tablets  -- Indication: For pain    losartan 50 mg oral tablet  -- 1 tab(s) by mouth once a day  -- Indication: For ARB    Neurontin 100 mg oral capsule  -- 1 cap(s) by mouth 2 times a day  -- Indication: For neuropathy    HumaLOG 100 units/mL subcutaneous solution  --  subcutaneous 3 times a day (with meals)  -- Sliding sclae  -- Indication: For DM    Lantus 100 units/mL subcutaneous solution  -- 28 unit(s) subcutaneous once a day (at bedtime)  -- Indication: For DM    atorvastatin 40 mg oral tablet  -- 1 tab(s) by mouth once a day (at bedtime)  -- Indication: For statin    carvedilol 25 mg oral tablet  -- 1 tab(s) by mouth 2 times a day  -- Indication: For betablocker    amLODIPine 10 mg oral tablet  -- 1 tab(s) by mouth once a day  -- Indication: For HTN (hypertension)    potassium chloride 20 mEq oral tablet, extended release  -- 1 tab(s) by mouth once a day  -- Indication: For supplement    pantoprazole 40 mg oral delayed release tablet  -- 1 tab(s) by mouth 2 times a day  -- Indication: For GERD    levothyroxine 150 mcg (0.15 mg) oral tablet  -- 1 tab(s) by mouth once a day  -- Indication: For supplement

## 2019-01-28 NOTE — DISCHARGE NOTE ADULT - PLAN OF CARE
recover from surgery Follow up Dr Perez next Thursday> Call Cyndi at  to schedule  Seek medical attention if short of breath, palpitations. pain unrelieved with medication or nausea/vomiting, fever,Do not swim in pool or ocean. Dr Perez will see you in office Thursday Feb 7

## 2019-01-29 LAB — SURGICAL PATHOLOGY STUDY: SIGNIFICANT CHANGE UP

## 2019-01-30 PROBLEM — K44.9 DIAPHRAGMATIC HERNIA WITHOUT OBSTRUCTION OR GANGRENE: Chronic | Status: ACTIVE | Noted: 2019-01-11

## 2019-02-04 PROBLEM — Z78.9 NON-SMOKER: Status: ACTIVE | Noted: 2019-02-04

## 2019-02-04 PROBLEM — Z78.9 DENIES ALCOHOL CONSUMPTION: Status: ACTIVE | Noted: 2019-02-04

## 2019-02-07 ENCOUNTER — APPOINTMENT (OUTPATIENT)
Dept: THORACIC SURGERY | Facility: CLINIC | Age: 69
End: 2019-02-07
Payer: MEDICARE

## 2019-02-07 ENCOUNTER — APPOINTMENT (OUTPATIENT)
Dept: CARDIOTHORACIC SURGERY | Facility: CLINIC | Age: 69
End: 2019-02-07

## 2019-02-07 VITALS
RESPIRATION RATE: 18 BRPM | DIASTOLIC BLOOD PRESSURE: 79 MMHG | SYSTOLIC BLOOD PRESSURE: 130 MMHG | HEART RATE: 82 BPM | HEIGHT: 62 IN | TEMPERATURE: 97.8 F | OXYGEN SATURATION: 100 %

## 2019-02-07 DIAGNOSIS — Z78.9 OTHER SPECIFIED HEALTH STATUS: ICD-10-CM

## 2019-02-07 PROCEDURE — 99024 POSTOP FOLLOW-UP VISIT: CPT

## 2019-02-07 RX ORDER — INSULIN ASPART 100 [IU]/ML
100 INJECTION, SOLUTION INTRAVENOUS; SUBCUTANEOUS
Refills: 0 | Status: ACTIVE | COMMUNITY

## 2019-02-14 NOTE — DISCHARGE NOTE ADULT - CARE PROVIDER_API CALL
After reviewing patient's chart, medications were filled per standing protocol. Carolina Serrano), Family Medicine  [address] [place] [address]  Phone: [phone]  Fax: [phone]    Devin Limon), Cardiovascular Disease  [address] [place] [address]  Phone: [phone]  Fax: [phone] Carolina Serrano), Family Medicine  [address] [place] [address]  Phone: [phone]  Fax: [phone]    Devin Limon), Cardiovascular Disease  [address] [facility]  [address] [place] [address]  Phone: [phone]  Fax: [phone]    Justin Hubbard (), Endocrinology[facility]abetes; Internal Medicine  [address] [place] [address]  Phone: [phone]  Fax: [phone]    Nabil Perez), Thoracic Surgery  [address] [address]  Phone: [phone]  Fax: [phone]

## 2019-02-26 NOTE — CDI QUERY NOTE - NSCDIOTHERTXTBX_GEN_ALL_CORE_HH
The patient is documented with CKD and CHF in the Anesthesia Record only.      Can you please clarify the patient's current diagnoses, after study?    1.) Patient has hypertensive chronic diastolic CHF and CKD  2.) Patient has current conditions of hypertension, CKD, and chronic diastolic CHF  3.) Other (please specify)  4.) Not clinically significant        CHART DOCUMENTATION:    1/25/19 Anesthesia Record Part I:  (located in the "Alpha" tab, page 10 in [facility])  In the Pre-op Evaluation section:  The patient is documented with "CKD", "HTN", "EF 65%" and "CHF"    1/27/19 Thoracic NP Note:  Problem/Plan - 4:  ·  Problem: HTN (hypertension).  Plan: IV lopressor.     1/11/19 H&P:  Core Measures/Disease Management:   Heart Failure:  Does this patient have a history of or has been diagnosed with heart failure? no.      LAB VALUES:    eGFR:  eGFR if : 67 mL/min/1.73M2 (01.25.19 @ 19:39)  eGFR if : 81 mL/min/1.73M2 (01.26.19 @ 01:35)  eGFR if African American: 66 mL/min/1.73M2 (01.27.19 @ 06:03)  eGFR if African American: 77 mL/min/1.73M2 (01.28.19 @ 10:49)

## 2019-03-21 ENCOUNTER — APPOINTMENT (OUTPATIENT)
Dept: THORACIC SURGERY | Facility: CLINIC | Age: 69
End: 2019-03-21

## 2019-05-09 ENCOUNTER — APPOINTMENT (OUTPATIENT)
Dept: THORACIC SURGERY | Facility: CLINIC | Age: 69
End: 2019-05-09
Payer: MEDICARE

## 2019-05-09 VITALS
DIASTOLIC BLOOD PRESSURE: 94 MMHG | HEART RATE: 58 BPM | BODY MASS INDEX: 34.96 KG/M2 | RESPIRATION RATE: 18 BRPM | OXYGEN SATURATION: 97 % | HEIGHT: 62 IN | WEIGHT: 190 LBS | SYSTOLIC BLOOD PRESSURE: 174 MMHG

## 2019-05-09 PROCEDURE — 99213 OFFICE O/P EST LOW 20 MIN: CPT

## 2019-05-09 RX ORDER — ONDANSETRON 4 MG/1
4 TABLET ORAL
Qty: 30 | Refills: 0 | Status: ACTIVE | COMMUNITY
Start: 2019-05-09 | End: 1900-01-01

## 2019-05-16 ENCOUNTER — OUTPATIENT (OUTPATIENT)
Dept: OUTPATIENT SERVICES | Facility: HOSPITAL | Age: 69
LOS: 1 days | End: 2019-05-16
Payer: MEDICARE

## 2019-05-16 VITALS
WEIGHT: 188.94 LBS | HEART RATE: 58 BPM | TEMPERATURE: 98 F | HEIGHT: 62.5 IN | DIASTOLIC BLOOD PRESSURE: 78 MMHG | RESPIRATION RATE: 16 BRPM | SYSTOLIC BLOOD PRESSURE: 150 MMHG

## 2019-05-16 DIAGNOSIS — Z95.1 PRESENCE OF AORTOCORONARY BYPASS GRAFT: Chronic | ICD-10-CM

## 2019-05-16 DIAGNOSIS — Z01.818 ENCOUNTER FOR OTHER PREPROCEDURAL EXAMINATION: ICD-10-CM

## 2019-05-16 DIAGNOSIS — Z98.89 OTHER SPECIFIED POSTPROCEDURAL STATES: Chronic | ICD-10-CM

## 2019-05-16 DIAGNOSIS — I25.10 ATHEROSCLEROTIC HEART DISEASE OF NATIVE CORONARY ARTERY WITHOUT ANGINA PECTORIS: ICD-10-CM

## 2019-05-16 DIAGNOSIS — I10 ESSENTIAL (PRIMARY) HYPERTENSION: ICD-10-CM

## 2019-05-16 DIAGNOSIS — E11.9 TYPE 2 DIABETES MELLITUS WITHOUT COMPLICATIONS: ICD-10-CM

## 2019-05-16 DIAGNOSIS — R13.10 DYSPHAGIA, UNSPECIFIED: ICD-10-CM

## 2019-05-16 DIAGNOSIS — Z29.9 ENCOUNTER FOR PROPHYLACTIC MEASURES, UNSPECIFIED: ICD-10-CM

## 2019-05-16 DIAGNOSIS — Z90.10 ACQUIRED ABSENCE OF UNSPECIFIED BREAST AND NIPPLE: Chronic | ICD-10-CM

## 2019-05-16 DIAGNOSIS — Z98.890 OTHER SPECIFIED POSTPROCEDURAL STATES: Chronic | ICD-10-CM

## 2019-05-16 LAB
ALBUMIN SERPL ELPH-MCNC: 4.2 G/DL — SIGNIFICANT CHANGE UP (ref 3.3–5.2)
ALP SERPL-CCNC: 135 U/L — HIGH (ref 40–120)
ALT FLD-CCNC: 11 U/L — SIGNIFICANT CHANGE UP
ANION GAP SERPL CALC-SCNC: 13 MMOL/L — SIGNIFICANT CHANGE UP (ref 5–17)
AST SERPL-CCNC: 15 U/L — SIGNIFICANT CHANGE UP
BASOPHILS # BLD AUTO: 0 K/UL — SIGNIFICANT CHANGE UP (ref 0–0.2)
BASOPHILS NFR BLD AUTO: 0.3 % — SIGNIFICANT CHANGE UP (ref 0–2)
BILIRUB SERPL-MCNC: 0.2 MG/DL — LOW (ref 0.4–2)
BUN SERPL-MCNC: 19 MG/DL — SIGNIFICANT CHANGE UP (ref 8–20)
CALCIUM SERPL-MCNC: 9.6 MG/DL — SIGNIFICANT CHANGE UP (ref 8.6–10.2)
CHLORIDE SERPL-SCNC: 101 MMOL/L — SIGNIFICANT CHANGE UP (ref 98–107)
CO2 SERPL-SCNC: 25 MMOL/L — SIGNIFICANT CHANGE UP (ref 22–29)
CREAT SERPL-MCNC: 1.07 MG/DL — SIGNIFICANT CHANGE UP (ref 0.5–1.3)
EOSINOPHIL # BLD AUTO: 0.1 K/UL — SIGNIFICANT CHANGE UP (ref 0–0.5)
EOSINOPHIL NFR BLD AUTO: 1.5 % — SIGNIFICANT CHANGE UP (ref 0–6)
GLUCOSE SERPL-MCNC: 218 MG/DL — HIGH (ref 70–115)
HBA1C BLD-MCNC: 8 % — HIGH (ref 4–5.6)
HCT VFR BLD CALC: 38.4 % — SIGNIFICANT CHANGE UP (ref 37–47)
HGB BLD-MCNC: 12.2 G/DL — SIGNIFICANT CHANGE UP (ref 12–16)
LYMPHOCYTES # BLD AUTO: 1.8 K/UL — SIGNIFICANT CHANGE UP (ref 1–4.8)
LYMPHOCYTES # BLD AUTO: 24.3 % — SIGNIFICANT CHANGE UP (ref 20–55)
MCHC RBC-ENTMCNC: 28 PG — SIGNIFICANT CHANGE UP (ref 27–31)
MCHC RBC-ENTMCNC: 31.8 G/DL — LOW (ref 32–36)
MCV RBC AUTO: 88.1 FL — SIGNIFICANT CHANGE UP (ref 81–99)
MONOCYTES # BLD AUTO: 0.7 K/UL — SIGNIFICANT CHANGE UP (ref 0–0.8)
MONOCYTES NFR BLD AUTO: 9.1 % — SIGNIFICANT CHANGE UP (ref 3–10)
NEUTROPHILS # BLD AUTO: 4.7 K/UL — SIGNIFICANT CHANGE UP (ref 1.8–8)
NEUTROPHILS NFR BLD AUTO: 64.7 % — SIGNIFICANT CHANGE UP (ref 37–73)
PLATELET # BLD AUTO: 171 K/UL — SIGNIFICANT CHANGE UP (ref 150–400)
POTASSIUM SERPL-MCNC: 4.2 MMOL/L — SIGNIFICANT CHANGE UP (ref 3.5–5.3)
POTASSIUM SERPL-SCNC: 4.2 MMOL/L — SIGNIFICANT CHANGE UP (ref 3.5–5.3)
PROT SERPL-MCNC: 7.7 G/DL — SIGNIFICANT CHANGE UP (ref 6.6–8.7)
RBC # BLD: 4.36 M/UL — LOW (ref 4.4–5.2)
RBC # FLD: 14.6 % — SIGNIFICANT CHANGE UP (ref 11–15.6)
SODIUM SERPL-SCNC: 139 MMOL/L — SIGNIFICANT CHANGE UP (ref 135–145)
WBC # BLD: 7.3 K/UL — SIGNIFICANT CHANGE UP (ref 4.8–10.8)
WBC # FLD AUTO: 7.3 K/UL — SIGNIFICANT CHANGE UP (ref 4.8–10.8)

## 2019-05-16 PROCEDURE — 36415 COLL VENOUS BLD VENIPUNCTURE: CPT

## 2019-05-16 PROCEDURE — 93005 ELECTROCARDIOGRAM TRACING: CPT

## 2019-05-16 PROCEDURE — 93010 ELECTROCARDIOGRAM REPORT: CPT

## 2019-05-16 PROCEDURE — 83036 HEMOGLOBIN GLYCOSYLATED A1C: CPT

## 2019-05-16 PROCEDURE — G0463: CPT

## 2019-05-16 PROCEDURE — 85027 COMPLETE CBC AUTOMATED: CPT

## 2019-05-16 PROCEDURE — 80053 COMPREHEN METABOLIC PANEL: CPT

## 2019-05-16 RX ORDER — SODIUM CHLORIDE 9 MG/ML
3 INJECTION INTRAMUSCULAR; INTRAVENOUS; SUBCUTANEOUS ONCE
Refills: 0 | Status: DISCONTINUED | OUTPATIENT
Start: 2019-05-24 | End: 2019-06-08

## 2019-05-16 RX ORDER — GABAPENTIN 400 MG/1
1 CAPSULE ORAL
Qty: 0 | Refills: 0 | DISCHARGE

## 2019-05-16 RX ORDER — INSULIN GLARGINE 100 [IU]/ML
28 INJECTION, SOLUTION SUBCUTANEOUS
Qty: 0 | Refills: 0 | DISCHARGE

## 2019-05-16 NOTE — H&P PST ADULT - NSICDXPASTSURGICALHX_GEN_ALL_CORE_FT
PAST SURGICAL HISTORY:  S/P appendectomy     S/P breast reconstruction, bilateral     S/P CABG x 4     S/P cardiac cath with stents    S/P hernia repair     S/P mastectomy     S/P tonsillectomy PAST SURGICAL HISTORY:  S/P appendectomy     S/P breast reconstruction, bilateral     S/P CABG x 4     S/P cardiac cath with stents    S/P hernia repair hiatal    S/P mastectomy     S/P tonsillectomy

## 2019-05-16 NOTE — H&P PST ADULT - NSICDXPROBLEM_GEN_ALL_CORE_FT
PROBLEM DIAGNOSES  Problem: Diabetes mellitus  Assessment and Plan: POC glucose DOS    Problem: Need for prophylactic measure  Assessment and Plan: Caprini score 5, high VTE risk, SCDs ordered, surgical team to assess need for pharm proph    Problem: HTN (hypertension)  Assessment and Plan: Preop medical eval.    Problem: Coronary artery disease  Assessment and Plan: Preop cardiac eval.    Problem: Dysphagia  Assessment and Plan: Upper endoscopy with possible balloon dilation

## 2019-05-16 NOTE — H&P PST ADULT - HISTORY OF PRESENT ILLNESS
69 yo female with dyphagia 69 yo female with dysphagia, pt underwent hiatal hernia repair in January, she states she has difficulty swallowing since and has lost 12 lbs.  Planning endoscopy possible dilation.

## 2019-05-16 NOTE — H&P PST ADULT - NSICDXPASTMEDICALHX_GEN_ALL_CORE_FT
PAST MEDICAL HISTORY:  Breast cancer     Coronary artery disease     CVA (cerebral vascular accident)     Diabetes mellitus     Glaucoma     Hiatal hernia     HTN (hypertension)     Hypercholesterolemia     Hypothyroid PAST MEDICAL HISTORY:  Breast cancer     Coronary artery disease     CVA (cerebral vascular accident) R weakness    Diabetes mellitus     Glaucoma     H/O diabetic retinopathy vision loss L eye    Hiatal hernia     HTN (hypertension)     Hypercholesterolemia     Hypothyroid

## 2019-05-16 NOTE — H&P PST ADULT - ASSESSMENT
69 yo female with dysphagia.    OSMANI VTE 2.0 SCORE [CLOT updated 2019]    AGE RELATED RISK FACTORS                                                       MOBILITY RELATED FACTORS  [ ] Age 41-60 years                                            (1 Point)                    [ ] Bed rest                                                        (1 Point)  [ x ] Age: 61-74 years                                           (2 Points)                  [ ] Plaster cast                                                   (2 Points)  [ ] Age= 75 years                                              (3 Points)                    [ ] Bed bound for more than 72 hours                 (2 Points)    DISEASE RELATED RISK FACTORS                                               GENDER SPECIFIC FACTORS  [x ] Edema in the lower extremities                       (1 Point)              [ ] Pregnancy                                                     (1 Point)  [ ] Varicose veins                                               (1 Point)                     [ ] Post-partum < 6 weeks                                   (1 Point)             [x ] BMI > 25 Kg/m2                                            (1 Point)                     [ ] Hormonal therapy  or oral contraception          (1 Point)                 [ ] Sepsis (in the previous month)                        (1 Point)               [ ] History of pregnancy complications                 (1 point)  [ ] Pneumonia or serious lung disease                                               [ ] Unexplained or recurrent                     (1 Point)           (in the previous month)                               (1 Point)  [ ] Abnormal pulmonary function test                     (1 Point)                 SURGERY RELATED RISK FACTORS  [ ] Acute myocardial infarction                              (1 Point)               [ ]  Section                                             (1 Point)  [ ] Congestive heart failure (in the previous month)  (1 Point)      [x ] Minor surgery                                                  (1 Point)   [ ] Inflammatory bowel disease                             (1 Point)               [ ] Arthroscopic surgery                                        (2 Points)  [ ] Central venous access                                      (2 Points)                [ ] General surgery lasting more than 45 minutes (2 points)  [ ] Malignancy- Present or previous                   (2 Points)                [ ] Elective arthroplasty                                         (5 points)    [ ] Stroke (in the previous month)                          (5 Points)                                                                                                                                                           HEMATOLOGY RELATED FACTORS                                                 TRAUMA RELATED RISK FACTORS  [ ] Prior episodes of VTE                                     (3 Points)                [ ] Fracture of the hip, pelvis, or leg                       (5 Points)  [ ] Positive family history for VTE                         (3 Points)             [ ] Acute spinal cord injury (in the previous month)  (5 Points)  [ ] Prothrombin 65657 A                                     (3 Points)               [ ] Paralysis  (less than 1 month)                             (5 Points)  [ ] Factor V Leiden                                             (3 Points)                  [ ] Multiple Trauma within 1 month                        (5 Points)  [ ] Lupus anticoagulants                                     (3 Points)                                                           [ ] Anticardiolipin antibodies                               (3 Points)                                                       [ ] High homocysteine in the blood                      (3 Points)                                             [ ] Other congenital or acquired thrombophilia      (3 Points)                                                [ ] Heparin induced thrombocytopenia                  (3 Points)                                     Total Score [   5       ]    OPIOID RISK TOOL    ELIZABETH EACH BOX THAT APPLIES AND ADD TOTALS AT THE END    FAMILY HISTORY OF SUBSTANCE ABUSE                 FEMALE         MALE                                                Alcohol                             [  ]1 pt          [  ]3pts                                               Illegal Drugs                     [  ]2 pts        [  ]3pts                                               Rx Drugs                           [  ]4 pts        [  ]4 pts    PERSONAL HISTORY OF SUBSTANCE ABUSE                                                                                          Alcohol                             [  ]3 pts       [  ]3 pts                                               Illegal Drugs                     [  ]4 pts        [  ]4 pts                                               Rx Drugs                           [  ]5 pts        [  ]5 pts    AGE BETWEEN 16-45 YEARS                                      [  ]1 pt         [  ]1 pt    HISTORY OF PREADOLESCENT   SEXUAL ABUSE                                                             [  ]3 pts        [  ]0pts    PSYCHOLOGICAL DISEASE                     ADD, OCD, Bipolar, Schizophrenia        [  ]2 pts         [  ]2 pts                      Depression                                               [  ]1 pt           [  ]1 pt           SCORING TOTAL   (add numbers and type here)              (*0*)                                     A score of 3 or lower indicated LOW risk for future opioid abuse  A score of 4 to 7 indicated moderate risk for future opioid abuse  A score of 8 or higher indicates a high risk for opioid abuse

## 2019-05-23 ENCOUNTER — TRANSCRIPTION ENCOUNTER (OUTPATIENT)
Age: 69
End: 2019-05-23

## 2019-05-24 ENCOUNTER — OUTPATIENT (OUTPATIENT)
Dept: INPATIENT UNIT | Facility: HOSPITAL | Age: 69
LOS: 1 days | End: 2019-05-24
Payer: MEDICARE

## 2019-05-24 ENCOUNTER — APPOINTMENT (OUTPATIENT)
Dept: THORACIC SURGERY | Facility: HOSPITAL | Age: 69
End: 2019-05-24

## 2019-05-24 VITALS
DIASTOLIC BLOOD PRESSURE: 84 MMHG | SYSTOLIC BLOOD PRESSURE: 158 MMHG | HEIGHT: 62 IN | WEIGHT: 186.95 LBS | RESPIRATION RATE: 16 BRPM | OXYGEN SATURATION: 100 % | HEART RATE: 59 BPM

## 2019-05-24 VITALS
SYSTOLIC BLOOD PRESSURE: 174 MMHG | HEART RATE: 62 BPM | DIASTOLIC BLOOD PRESSURE: 84 MMHG | OXYGEN SATURATION: 98 % | RESPIRATION RATE: 14 BRPM

## 2019-05-24 DIAGNOSIS — Z98.89 OTHER SPECIFIED POSTPROCEDURAL STATES: Chronic | ICD-10-CM

## 2019-05-24 DIAGNOSIS — Z90.10 ACQUIRED ABSENCE OF UNSPECIFIED BREAST AND NIPPLE: Chronic | ICD-10-CM

## 2019-05-24 DIAGNOSIS — Z98.890 OTHER SPECIFIED POSTPROCEDURAL STATES: Chronic | ICD-10-CM

## 2019-05-24 DIAGNOSIS — R13.10 DYSPHAGIA, UNSPECIFIED: ICD-10-CM

## 2019-05-24 DIAGNOSIS — Z95.1 PRESENCE OF AORTOCORONARY BYPASS GRAFT: Chronic | ICD-10-CM

## 2019-05-24 LAB
GLUCOSE BLDC GLUCOMTR-MCNC: 158 MG/DL — HIGH (ref 70–99)
GLUCOSE BLDC GLUCOMTR-MCNC: 177 MG/DL — HIGH (ref 70–99)

## 2019-05-24 PROCEDURE — 43235 EGD DIAGNOSTIC BRUSH WASH: CPT

## 2019-05-24 PROCEDURE — 43200 ESOPHAGOSCOPY FLEXIBLE BRUSH: CPT

## 2019-05-24 PROCEDURE — 82962 GLUCOSE BLOOD TEST: CPT

## 2019-05-24 RX ORDER — SODIUM CHLORIDE 9 MG/ML
1000 INJECTION, SOLUTION INTRAVENOUS
Refills: 0 | Status: DISCONTINUED | OUTPATIENT
Start: 2019-05-24 | End: 2019-05-24

## 2019-05-24 RX ORDER — ONDANSETRON 8 MG/1
4 TABLET, FILM COATED ORAL ONCE
Refills: 0 | Status: DISCONTINUED | OUTPATIENT
Start: 2019-05-24 | End: 2019-05-24

## 2019-05-24 RX ORDER — FENTANYL CITRATE 50 UG/ML
25 INJECTION INTRAVENOUS
Refills: 0 | Status: DISCONTINUED | OUTPATIENT
Start: 2019-05-24 | End: 2019-05-24

## 2019-07-29 NOTE — ED ADULT TRIAGE NOTE - CHIEF COMPLAINT QUOTE
pt johnny from doctors office, became AMS at office, blood sugar was 22, received 1 amp d 50 by ems, 224 in triage Additional Safety/Bands:

## 2020-03-22 NOTE — ASU PREOP CHECKLIST - AS BP NONINV METHOD
IP Acute Physical Therapy  Plan of Care Note    Assessment: Treatment today focused on transfers.  Patient is demonstrating guarded progress.    Patient limited at this time by weakness.  Patient will benefit from further skilled PT for continued training with bed mobility, transfers, strengthening.    Recommendations and Plan:  PT Identified Barriers to Discharge: weakness, coordination  Recommendation for Discharge: PT WI: Home therapy;Sub-acute nursing home(pending progress) (03/19/20 1135)    Treatment Plan for Next Session: Increase activity tolerance and right LE strength and coordination as able       Below is key objective and subjective information as of the date/time noted.  For further details and goals, please refer to the PT Assess/Treat/Goals flowsheet.    Diagnosis:  1. Cerebrovascular accident (CVA), unspecified mechanism (CMS/HCC)    2. Dysphagia, oral phase    3. Acute CVA (cerebrovascular accident) (CMS/HCC)    4. Acute ischemic left MCA stroke (CMS/HCC)    5. Right sided weakness    6. Slurred speech        Precautions:  Precautions  Other Precautions: fall risk, contact, droplet (03/22/20 0840)  Precautions Comments: fall risk (03/19/20 1200)    Prior Living Situation:  Type of Home: Apartment (03/19/20 1200)  Home Layout: One level;Performs ADL's on one level;Able to Live on Main level with bedroom/bathroom;Stairs to enter with rails;Laundry in basement (03/19/20 1200)  # Steps to Enter: 5 (03/19/20 1200)    Lives With: Alone (03/19/20 1200)  Receives Help From: None (03/19/20 1200)    Subjective:  Subjective: Patient supine in bed agreeable to PT. (03/22/20 0840)    Bed Mobility:  Bed Mobility  Supine to Sit: Total Assist - Non-dependent(x2) (03/22/20 0840)    Transfers:  Transfers  Sit to Stand: Total Assist - Dependent(x2) (03/22/20 0840)  Stand to Sit: Total Assist - Dependent(x2) (03/22/20 0840)        Exercise:  Exercise Comments: PROM RLE and DELVIN (03/22/20 0840)    Education:   On this  date, the patient was educated on transfers.    The response to education was: Verbalizes understanding and Demonstrates understanding.      Equipment:     PT/OT ADL Equipment for Discharge: grab bars for toilet. tub transfer bench (03/19/20 1200)    Goals:  Goals  Short Term Goals to Be Reviewed On: 03/26/20 (03/19/20 1135)  Short Term Goals = Discharge Goals: Yes (03/19/20 1135)  Goal Agreement: Patient agrees with goals and treatment plan (03/19/20 1135)  Bed Mobility Discharge Goal: Patient to be indep with bed mobility to allow safe return to PLOF. (03/19/20 1135)  Transfer Discharge Goal: Patient to be indep with transfers to allow safe return to PLOF (03/19/20 1135)  Ambulation Discharge Goal: Patient to amb x 50+ feet, least restrictive assistive device, mod indep to allow safe return to PLOF. (03/19/20 1135)  Stairs Discharge Goal: Patient to amb up/down 5 steps with use of handrail and SPC if needed, mod indep to allow safe return to PLOF. (03/19/20 1135)  Therapeutic Exercise Discharge Goal: Patient to tolerate LE strengthening to allowed continued progress of strength after d/c (03/19/20 1135)    Interventions and Treatment Time:  Treatment/Interventions: Functional transfer training;Strengthening;Endurance training;Patient/Family training;Bed mobility;Gait training;Stairs retraining;Safety Education (03/19/20 1135)  PT Time Spent: 20 minutes (03/22/20 7702)       electronic

## 2020-04-10 NOTE — H&P PST ADULT - RESPIRATORY RATE (BREATHS/MIN)
Chief Complaint   Patient presents with   • Possible Stroke     Transfer from [facility], TPA infusion completed at time of arrival, transfer for Interventional Radiology     Received in report from EMS stroke onset 1730 4/9/20.   16

## 2020-10-19 PROBLEM — Z86.39 PERSONAL HISTORY OF OTHER ENDOCRINE, NUTRITIONAL AND METABOLIC DISEASE: Chronic | Status: ACTIVE | Noted: 2019-05-16

## 2020-10-23 ENCOUNTER — OUTPATIENT (OUTPATIENT)
Dept: OUTPATIENT SERVICES | Facility: HOSPITAL | Age: 70
LOS: 1 days | End: 2020-10-23
Payer: MEDICARE

## 2020-10-23 ENCOUNTER — INPATIENT (INPATIENT)
Facility: HOSPITAL | Age: 70
LOS: 7 days | Discharge: HOME | DRG: 287 | End: 2020-10-31
Attending: HOSPITALIST | Admitting: INTERNAL MEDICINE
Payer: MEDICARE

## 2020-10-23 VITALS
DIASTOLIC BLOOD PRESSURE: 91 MMHG | SYSTOLIC BLOOD PRESSURE: 210 MMHG | RESPIRATION RATE: 20 BRPM | HEART RATE: 71 BPM | OXYGEN SATURATION: 100 % | TEMPERATURE: 98 F

## 2020-10-23 DIAGNOSIS — Z98.89 OTHER SPECIFIED POSTPROCEDURAL STATES: Chronic | ICD-10-CM

## 2020-10-23 DIAGNOSIS — I16.0 HYPERTENSIVE URGENCY: ICD-10-CM

## 2020-10-23 DIAGNOSIS — Z95.1 PRESENCE OF AORTOCORONARY BYPASS GRAFT: Chronic | ICD-10-CM

## 2020-10-23 DIAGNOSIS — Z98.890 OTHER SPECIFIED POSTPROCEDURAL STATES: Chronic | ICD-10-CM

## 2020-10-23 DIAGNOSIS — Z90.10 ACQUIRED ABSENCE OF UNSPECIFIED BREAST AND NIPPLE: Chronic | ICD-10-CM

## 2020-10-23 DIAGNOSIS — I50.32 CHRONIC DIASTOLIC (CONGESTIVE) HEART FAILURE: ICD-10-CM

## 2020-10-23 DIAGNOSIS — I63.89 OTHER CEREBRAL INFARCTION: ICD-10-CM

## 2020-10-23 LAB
ALBUMIN SERPL ELPH-MCNC: 3.8 G/DL — SIGNIFICANT CHANGE UP (ref 3.3–5.2)
ALP SERPL-CCNC: 147 U/L — HIGH (ref 40–120)
ALT FLD-CCNC: 17 U/L — SIGNIFICANT CHANGE UP
ANION GAP SERPL CALC-SCNC: 12 MMOL/L — SIGNIFICANT CHANGE UP (ref 5–17)
APTT BLD: 27.1 SEC — LOW (ref 27.5–35.5)
AST SERPL-CCNC: 21 U/L — SIGNIFICANT CHANGE UP
BASOPHILS # BLD AUTO: 0.03 K/UL — SIGNIFICANT CHANGE UP (ref 0–0.2)
BASOPHILS NFR BLD AUTO: 0.4 % — SIGNIFICANT CHANGE UP (ref 0–2)
BILIRUB SERPL-MCNC: 0.5 MG/DL — SIGNIFICANT CHANGE UP (ref 0.4–2)
BUN SERPL-MCNC: 21 MG/DL — HIGH (ref 8–20)
CALCIUM SERPL-MCNC: 9.3 MG/DL — SIGNIFICANT CHANGE UP (ref 8.6–10.2)
CHLORIDE SERPL-SCNC: 101 MMOL/L — SIGNIFICANT CHANGE UP (ref 98–107)
CO2 SERPL-SCNC: 23 MMOL/L — SIGNIFICANT CHANGE UP (ref 22–29)
CREAT SERPL-MCNC: 0.81 MG/DL — SIGNIFICANT CHANGE UP (ref 0.5–1.3)
EOSINOPHIL # BLD AUTO: 0.02 K/UL — SIGNIFICANT CHANGE UP (ref 0–0.5)
EOSINOPHIL NFR BLD AUTO: 0.3 % — SIGNIFICANT CHANGE UP (ref 0–6)
GLUCOSE BLDC GLUCOMTR-MCNC: 212 MG/DL — HIGH (ref 70–99)
GLUCOSE BLDC GLUCOMTR-MCNC: 358 MG/DL — HIGH (ref 70–99)
GLUCOSE SERPL-MCNC: 451 MG/DL — HIGH (ref 70–99)
HCT VFR BLD CALC: 36.6 % — SIGNIFICANT CHANGE UP (ref 34.5–45)
HGB BLD-MCNC: 11.5 G/DL — SIGNIFICANT CHANGE UP (ref 11.5–15.5)
IMM GRANULOCYTES NFR BLD AUTO: 0.6 % — SIGNIFICANT CHANGE UP (ref 0–1.5)
INR BLD: 1.02 RATIO — SIGNIFICANT CHANGE UP (ref 0.88–1.16)
LIDOCAIN IGE QN: 6 U/L — LOW (ref 22–51)
LYMPHOCYTES # BLD AUTO: 1.2 K/UL — SIGNIFICANT CHANGE UP (ref 1–3.3)
LYMPHOCYTES # BLD AUTO: 17.3 % — SIGNIFICANT CHANGE UP (ref 13–44)
MAGNESIUM SERPL-MCNC: 2 MG/DL — SIGNIFICANT CHANGE UP (ref 1.8–2.6)
MCHC RBC-ENTMCNC: 28 PG — SIGNIFICANT CHANGE UP (ref 27–34)
MCHC RBC-ENTMCNC: 31.4 GM/DL — LOW (ref 32–36)
MCV RBC AUTO: 89.3 FL — SIGNIFICANT CHANGE UP (ref 80–100)
MONOCYTES # BLD AUTO: 0.47 K/UL — SIGNIFICANT CHANGE UP (ref 0–0.9)
MONOCYTES NFR BLD AUTO: 6.8 % — SIGNIFICANT CHANGE UP (ref 2–14)
NEUTROPHILS # BLD AUTO: 5.18 K/UL — SIGNIFICANT CHANGE UP (ref 1.8–7.4)
NEUTROPHILS NFR BLD AUTO: 74.6 % — SIGNIFICANT CHANGE UP (ref 43–77)
NT-PROBNP SERPL-SCNC: 1313 PG/ML — HIGH (ref 0–300)
PLATELET # BLD AUTO: 121 K/UL — LOW (ref 150–400)
POTASSIUM SERPL-MCNC: 4.5 MMOL/L — SIGNIFICANT CHANGE UP (ref 3.5–5.3)
POTASSIUM SERPL-SCNC: 4.5 MMOL/L — SIGNIFICANT CHANGE UP (ref 3.5–5.3)
PROT SERPL-MCNC: 7.2 G/DL — SIGNIFICANT CHANGE UP (ref 6.6–8.7)
PROTHROM AB SERPL-ACNC: 11.8 SEC — SIGNIFICANT CHANGE UP (ref 10.6–13.6)
RBC # BLD: 4.1 M/UL — SIGNIFICANT CHANGE UP (ref 3.8–5.2)
RBC # FLD: 14.6 % — HIGH (ref 10.3–14.5)
SARS-COV-2 RNA SPEC QL NAA+PROBE: SIGNIFICANT CHANGE UP
SODIUM SERPL-SCNC: 136 MMOL/L — SIGNIFICANT CHANGE UP (ref 135–145)
TROPONIN T SERPL-MCNC: 0.01 NG/ML — SIGNIFICANT CHANGE UP (ref 0–0.06)
WBC # BLD: 6.94 K/UL — SIGNIFICANT CHANGE UP (ref 3.8–10.5)
WBC # FLD AUTO: 6.94 K/UL — SIGNIFICANT CHANGE UP (ref 3.8–10.5)

## 2020-10-23 PROCEDURE — 78452 HT MUSCLE IMAGE SPECT MULT: CPT

## 2020-10-23 PROCEDURE — 95819 EEG AWAKE AND ASLEEP: CPT | Mod: 26

## 2020-10-23 PROCEDURE — 93017 CV STRESS TEST TRACING ONLY: CPT

## 2020-10-23 PROCEDURE — 93018 CV STRESS TEST I&R ONLY: CPT

## 2020-10-23 PROCEDURE — A9500: CPT

## 2020-10-23 PROCEDURE — 99291 CRITICAL CARE FIRST HOUR: CPT

## 2020-10-23 PROCEDURE — 93010 ELECTROCARDIOGRAM REPORT: CPT

## 2020-10-23 PROCEDURE — 93306 TTE W/DOPPLER COMPLETE: CPT | Mod: 26

## 2020-10-23 PROCEDURE — 71045 X-RAY EXAM CHEST 1 VIEW: CPT | Mod: 26

## 2020-10-23 PROCEDURE — 99222 1ST HOSP IP/OBS MODERATE 55: CPT

## 2020-10-23 PROCEDURE — 70450 CT HEAD/BRAIN W/O DYE: CPT | Mod: 26

## 2020-10-23 PROCEDURE — 78452 HT MUSCLE IMAGE SPECT MULT: CPT | Mod: 26

## 2020-10-23 PROCEDURE — 93016 CV STRESS TEST SUPVJ ONLY: CPT

## 2020-10-23 PROCEDURE — 99223 1ST HOSP IP/OBS HIGH 75: CPT

## 2020-10-23 RX ORDER — DEXTROSE 50 % IN WATER 50 %
12.5 SYRINGE (ML) INTRAVENOUS ONCE
Refills: 0 | Status: DISCONTINUED | OUTPATIENT
Start: 2020-10-23 | End: 2020-10-31

## 2020-10-23 RX ORDER — INSULIN GLARGINE 100 [IU]/ML
15 INJECTION, SOLUTION SUBCUTANEOUS AT BEDTIME
Refills: 0 | Status: DISCONTINUED | OUTPATIENT
Start: 2020-10-23 | End: 2020-10-31

## 2020-10-23 RX ORDER — LEVOTHYROXINE SODIUM 125 MCG
137 TABLET ORAL DAILY
Refills: 0 | Status: DISCONTINUED | OUTPATIENT
Start: 2020-10-23 | End: 2020-10-31

## 2020-10-23 RX ORDER — GABAPENTIN 400 MG/1
1 CAPSULE ORAL
Qty: 0 | Refills: 0 | DISCHARGE

## 2020-10-23 RX ORDER — DEXTROSE 50 % IN WATER 50 %
25 SYRINGE (ML) INTRAVENOUS ONCE
Refills: 0 | Status: DISCONTINUED | OUTPATIENT
Start: 2020-10-23 | End: 2020-10-31

## 2020-10-23 RX ORDER — GLUCAGON INJECTION, SOLUTION 0.5 MG/.1ML
1 INJECTION, SOLUTION SUBCUTANEOUS ONCE
Refills: 0 | Status: DISCONTINUED | OUTPATIENT
Start: 2020-10-23 | End: 2020-10-31

## 2020-10-23 RX ORDER — INSULIN LISPRO 100/ML
8 VIAL (ML) SUBCUTANEOUS
Refills: 0 | Status: DISCONTINUED | OUTPATIENT
Start: 2020-10-23 | End: 2020-10-31

## 2020-10-23 RX ORDER — CARVEDILOL PHOSPHATE 80 MG/1
12.5 CAPSULE, EXTENDED RELEASE ORAL EVERY 12 HOURS
Refills: 0 | Status: DISCONTINUED | OUTPATIENT
Start: 2020-10-23 | End: 2020-10-24

## 2020-10-23 RX ORDER — HYDRALAZINE HCL 50 MG
50 TABLET ORAL
Refills: 0 | Status: DISCONTINUED | OUTPATIENT
Start: 2020-10-23 | End: 2020-10-24

## 2020-10-23 RX ORDER — INSULIN GLARGINE 100 [IU]/ML
18 INJECTION, SOLUTION SUBCUTANEOUS
Qty: 0 | Refills: 0 | DISCHARGE

## 2020-10-23 RX ORDER — SODIUM CHLORIDE 9 MG/ML
1000 INJECTION, SOLUTION INTRAVENOUS
Refills: 0 | Status: DISCONTINUED | OUTPATIENT
Start: 2020-10-23 | End: 2020-10-31

## 2020-10-23 RX ORDER — DEXTROSE 50 % IN WATER 50 %
15 SYRINGE (ML) INTRAVENOUS ONCE
Refills: 0 | Status: DISCONTINUED | OUTPATIENT
Start: 2020-10-23 | End: 2020-10-31

## 2020-10-23 RX ORDER — FUROSEMIDE 40 MG
1 TABLET ORAL
Qty: 0 | Refills: 0 | DISCHARGE

## 2020-10-23 RX ORDER — INSULIN ASPART 100 [IU]/ML
0 INJECTION, SOLUTION SUBCUTANEOUS
Qty: 0 | Refills: 0 | DISCHARGE

## 2020-10-23 RX ORDER — ASPIRIN/CALCIUM CARB/MAGNESIUM 324 MG
81 TABLET ORAL DAILY
Refills: 0 | Status: DISCONTINUED | OUTPATIENT
Start: 2020-10-23 | End: 2020-10-31

## 2020-10-23 RX ORDER — ATORVASTATIN CALCIUM 80 MG/1
40 TABLET, FILM COATED ORAL AT BEDTIME
Refills: 0 | Status: DISCONTINUED | OUTPATIENT
Start: 2020-10-23 | End: 2020-10-31

## 2020-10-23 RX ORDER — HYDRALAZINE HCL 50 MG
1 TABLET ORAL
Qty: 0 | Refills: 0 | DISCHARGE

## 2020-10-23 RX ORDER — LOSARTAN POTASSIUM 100 MG/1
100 TABLET, FILM COATED ORAL DAILY
Refills: 0 | Status: DISCONTINUED | OUTPATIENT
Start: 2020-10-24 | End: 2020-10-31

## 2020-10-23 RX ORDER — CARVEDILOL PHOSPHATE 80 MG/1
1 CAPSULE, EXTENDED RELEASE ORAL
Qty: 0 | Refills: 0 | DISCHARGE

## 2020-10-23 RX ORDER — GABAPENTIN 400 MG/1
100 CAPSULE ORAL
Refills: 0 | Status: DISCONTINUED | OUTPATIENT
Start: 2020-10-23 | End: 2020-10-26

## 2020-10-23 RX ORDER — INSULIN LISPRO 100/ML
VIAL (ML) SUBCUTANEOUS
Refills: 0 | Status: DISCONTINUED | OUTPATIENT
Start: 2020-10-23 | End: 2020-10-31

## 2020-10-23 RX ORDER — LOSARTAN POTASSIUM 100 MG/1
100 TABLET, FILM COATED ORAL ONCE
Refills: 0 | Status: COMPLETED | OUTPATIENT
Start: 2020-10-23 | End: 2020-10-23

## 2020-10-23 RX ORDER — POTASSIUM CHLORIDE 20 MEQ
1 PACKET (EA) ORAL
Qty: 0 | Refills: 0 | DISCHARGE

## 2020-10-23 RX ORDER — INSULIN LISPRO 100/ML
VIAL (ML) SUBCUTANEOUS AT BEDTIME
Refills: 0 | Status: DISCONTINUED | OUTPATIENT
Start: 2020-10-23 | End: 2020-10-31

## 2020-10-23 RX ADMIN — CARVEDILOL PHOSPHATE 12.5 MILLIGRAM(S): 80 CAPSULE, EXTENDED RELEASE ORAL at 19:04

## 2020-10-23 RX ADMIN — Medication 10: at 19:01

## 2020-10-23 RX ADMIN — ATORVASTATIN CALCIUM 40 MILLIGRAM(S): 80 TABLET, FILM COATED ORAL at 21:58

## 2020-10-23 RX ADMIN — INSULIN GLARGINE 15 UNIT(S): 100 INJECTION, SOLUTION SUBCUTANEOUS at 21:58

## 2020-10-23 RX ADMIN — LOSARTAN POTASSIUM 100 MILLIGRAM(S): 100 TABLET, FILM COATED ORAL at 19:03

## 2020-10-23 RX ADMIN — GABAPENTIN 100 MILLIGRAM(S): 400 CAPSULE ORAL at 19:08

## 2020-10-23 RX ADMIN — Medication 50 MILLIGRAM(S): at 19:08

## 2020-10-23 NOTE — ED ADULT NURSE REASSESSMENT NOTE - NS ED NURSE REASSESS COMMENT FT1
pt handed off to RN RITESH in stable condition. Pt oriented to unit, plan of care explained. call bell system explained to pt. no apparent distress noted at this time. pt denies any complaints, placed on tele monitor.

## 2020-10-23 NOTE — H&P ADULT - NSICDXPASTMEDICALHX_GEN_ALL_CORE_FT
PAST MEDICAL HISTORY:  Breast cancer     Coronary artery disease     CVA (cerebral vascular accident) R weakness    Diabetes mellitus     Glaucoma     H/O diabetic retinopathy vision loss L eye    Hiatal hernia     HTN (hypertension)     Hypercholesterolemia     Hypothyroid

## 2020-10-23 NOTE — ED PROVIDER NOTE - ATTENDING CONTRIBUTION TO CARE
I, Calli House, have personally seen and examined this patient. I have fully participated in the care of this patient. I have reviewed all pertinent clinical information, including history, physical exam, plan and the Resident's note and agree except as noted below.     71yo F with DM, HTN had stress today at end of stress complaint of CP and a period of AMS- unclear if syncopal episode but rapid called, stated by RN 'pt not acting right' slow to respond to questions. RAPID response called. /110s given 10mg hydralazine. patient complain tof CP now improved prior to our arrival. no HA. no weakness. states speech is normal, uses dentures which she doesn't have in. did not take home BP meds this morning     Gen: NAD, AOx3, slow to respond but answering appropriately, faint diaphoresis  Head: NCAT  HEENT: PERRL, EOMI, oral mucosa moist, normal conjunctiva, neck supple  Lung: CTAB, no respiratory distress  CV: rrr, no murmur, Normal perfusion  Abd: soft, NTND  MSK: No edema, no visible deformities  Neuro: No focal neurologic deficits, CN II-XII intact, 5/5 global strength, sensation intact, no dysmetria/ataxia, NIH 0, no dysarthria/aphasia  Skin: No rash   Psych: normal affect     patient with likely HTN urgency, NIH 0 not a stroke, hydaralaize given, ct head. labs w trop. discusss with cards concern for abnormal stress and admit

## 2020-10-23 NOTE — ED ADULT NURSE NOTE - OBJECTIVE STATEMENT
Pt brought to ED from nuclear medicine after developing CP, SOB during stress test. Pt states she felt b/l lower pectoral CP (8/10 pain) radiating to back and felt SOB which activated a rapid reponse. Pt aox4, breathing equal and unlabored, nsr on arrival. Pt found to be hypertensive and endorses missing medication this morning for the stress test. Pt given 10 of hydralazine at [facility] prior to going to ED. Pt PMHx HTN, DM, diabetic neuropathy, HLD, stents, quadruple bypass (2013).

## 2020-10-23 NOTE — ED ADULT NURSE REASSESSMENT NOTE - NS ED NURSE REASSESS COMMENT FT1
Pt care assumed from off going RN, charting as noted. Pt in no apparent distress at this time. Airway patent, breathing spontaneous and nonlabored. Pt A&Ox3 resting in stretcher. Pt states she came to [facility] for a stress test and started to have severe chest pain. Pt reports she was told she synopsized but does not recall events. Pt denies any chest pain, SOB or difficulty breathing at this time. Pt VSS as per flowsheet. L AC PIV patent, flushes without difficulty. Pt care assumed from off going RN, charting as noted. Pt in no apparent distress at this time. Airway patent, breathing spontaneous and nonlabored. Pt A&Ox3 resting in stretcher. Pt states she came to [facility] for a stress test and started to have severe chest pain. Pt reports she was told she synopsized but does not recall events. Pt denies any chest pain, SOB or difficulty breathing at this time. Pt VSS as per flowsheet. L AC PIV patent, flushes without difficulty. Pt reeducated on NPO status. Pt admitted, awaiting bed.

## 2020-10-23 NOTE — H&P ADULT - NSICDXPASTSURGICALHX_GEN_ALL_CORE_FT
PAST SURGICAL HISTORY:  S/P appendectomy     S/P breast reconstruction, bilateral     S/P CABG x 4     S/P cardiac cath with stents    S/P hernia repair hiatal    S/P mastectomy     S/P tonsillectomy

## 2020-10-23 NOTE — H&P ADULT - ASSESSMENT
69 yo female with CABG and CAD (PCI in the past), HTN, DM II (on insulin), hypothyroidism who presents to the hospital after arriving for outpatient NST at which she had an RRT. Patient was found to have hypertensive emergency with change in mental status. Patient describes that during event she had band-like tightness across her chest associated with SOB. Patient had CABG in 2013 and last PCI 2 years ago she self reports. She failed her NST and is now scheduled for cath this afternoon with cardiology. Cardiology asked ED to call for neurology eval prior to cath. Dr. Baig notified who will see patient.    #chest pain during NST  - admit to tele  - to go to cath lab today  - may require intervention  - check EKG post procedure  - cardiology following    #HTN with hypertensive emergency likely from adnenosine  - BP improved  - monitor BP while here  - losartan 100mg daily  - neurology consulted by ED      #DM II  - ISS while here  - lantus 10 U at night  - FS TIDAC    #hypothyroidism  - continue synthroid    #DVT ppx  - continue aspirin and SCD until after procedures 71 yo female with CABG and CAD (PCI in the past), HTN, DM II (on insulin), hypothyroidism who presents to the hospital after arriving for outpatient NST at which she had an RRT. Patient was found to have hypertensive emergency with change in mental status. Patient describes that during event she had band-like tightness across her chest associated with SOB. Patient had CABG in 2013 and last PCI 2 years ago she self reports. She failed her NST and is now scheduled for cath this afternoon with cardiology. Cardiology asked ED to call for neurology eval prior to cath. Dr. Baig notified who will see patient.    #chest pain during NST  - admit to tele  - to go to cath lab today  - may require intervention  - check EKG post procedure  - cardiology following    #HTN with hypertensive emergency likely from adnenosine; had slurred speech during episode without residual symptoms at this time  - BP improved  - monitor BP while here  - losartan 100mg daily  - neurology consulted by ED    #DM II  - ISS while here  - lantus 10 U at night  - FS TIDAC    #hypothyroidism  - continue synthroid    #DVT ppx  - continue aspirin and SCD until after procedures

## 2020-10-23 NOTE — ED ADULT NURSE REASSESSMENT NOTE - NS ED NURSE REASSESS COMMENT FT1
received pt from Critical care, pt was in Nuclear Medicine for Stress test, was a Rapid Response, pt brought to ED

## 2020-10-23 NOTE — ED ADULT TRIAGE NOTE - CHIEF COMPLAINT QUOTE
Pt coming from nuclear medicine after having a stress test this morning, after the procedure pt c/o having chest pain, rapid response initiated, pt reports not taking HTN medication this morning, pt denies chest pain at this time

## 2020-10-23 NOTE — ED ADULT NURSE NOTE - CADM POA PRESS ULCER
Electrophysiology Consult     Olinda Mane  1964  [unfilled]  [unfilled]    12/14/18    DATE OF ADMISSION: (Not on file)  [facility] CARDIOLOGY    Chirinos, Phyllis Kent, APRN  [address] / [facility] [address]    Chief Complaint   Patient presents with   • Irregular Heart Beat     Problem List:  1.  PVCs:  a. Holter monitor, 07/19/2012, with heart rate of   BPM, average 70 BPM with no PACs or PVCs.  b. Event recorder, September 2012, with PVCs, diltiazem initiated.  c. GXT, 09/26/2012, with no ischemia.  d. Echocardiogram, 09/26/2012, with LA 3.1, EF 65%.  e. Event recorder, May 2013, with PVCs, flecainide initiated.  f. Flecainide stopped 2013  2. AVNRT:  a. 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  3. Wide complex tachycardia:  a. Left heart catheterization by Dr. Morrow, 11/21/2000, with normal RV-gram, normal coronary arteries, negative  methylergonovine provocation study.  4. Heat exhaustion, 2012.  5. GERD  6.  Anxiety.  7. Remote nephrolithiasis    History of Present Illness:   54 year old WF with history of PVCs and AVNRT s/p RFA in 2000 who presents today to re-establish care with Dr. Cabrera for preventative reasons. She was last seen in 2015 and was doing well. Over the past three years, she has been doing well overall. She still has palpitations 3-4 times per week, that she describes as skipped beats, lasting only a few seconds. Mild in nature, not affecting quality of life. No further cardiac evaluation since we last saw her. Really, no change in her symptoms. She has recently lost weight with diet and exercise and is feeling more energy. She recently had labs drawn and cholesterol was slightly high. She is trying to control it with diet and she will have it rechecked with PCP. She is concerned that her father had a CVA recently due  to carotid disease. He had a carotid stent placed. She has not had a carotid duplex. She denies CVA or TIA symptoms. No syncope. No surgeries, hospitalizations or ER visits. No tobacco. No ETOH. 2 cups of coffee daily. No stimulants or supplements. She does have GERD but it is well controlled with her diet and weight loss.     No Known Allergies     Cannot display prior to admission medications because the patient has not been admitted in this contact.            Current Outpatient Medications:   •  Misc Natural Products (ESTROVEN ENERGY PO), Take  by mouth., Disp: , Rfl:   •  PREMARIN 0.3 MG tablet, , Disp: , Rfl:     Social History     Socioeconomic History   • Marital status:      Spouse name: Not on file   • Number of children: Not on file   • Years of education: Not on file   • Highest education level: Not on file   Tobacco Use   • Smoking status: Never Smoker   • Smokeless tobacco: Never Used   Substance and Sexual Activity   • Alcohol use: No     Frequency: Never   • Drug use: No   • Sexual activity: Defer       Family History   Problem Relation Age of Onset   • No Known Problems Mother    • Stroke Father    • COPD Father    • Hypertension Brother    Father had CVA recently secondary to carotid disease.     REVIEW OF SYSTEMS:   CONST:  No weight loss, fever, chills, weakness or fatigue.   HEENT:  No visual loss, blurred vision, double vision, yellow sclerae.                   No hearing loss, congestion, sore throat.   SKIN:      No rashes, urticaria, ulcers, sores.     RESP:     No shortness of breath, hemoptysis, cough, sputum.   GI:           No anorexia, nausea, vomiting, diarrhea. No abdominal pain, melena.   :         No burning on urination, hematuria or increased frequency.  ENDO:    No diaphoresis, cold or heat intolerance. No polyuria or polydipsia.   NEURO:  No headache, dizziness, syncope, paralysis, ataxia, or parasthesias.                  No change in bowel or bladder control. No  "history of CVA/TIA  MUSC:    No muscle, back pain, joint pain or stiffness.   HEME:    No anemia, bleeding, bruising. No history of DVT/PE.  PSYCH:  No history of depression, anxiety    Vitals:    12/14/18 1001   BP: 118/76   BP Location: Left arm   Patient Position: Sitting   Pulse: 75   Weight: 71.7 kg (158 lb)   Height: 167.6 cm (66\")                 Physical Exam:  GEN: Well nourished, well-developed, no acute distress  HEENT: Normocephalic, atraumatic, PERRLA, moist mucous membranes  NECK: Supple, NO JVD, no thyromegaly, no lymphadenopathy. No bruits auscultated.   CARD: S1S2, RRR, no murmur, gallop, rub, PMI NL   LUNGS: Clear to auscultation, normal respiratory effort  ABDOMEN: Soft, nontender, normal bowel sounds  EXTREMITIES: No gross deformities, no clubbing, cyanosis, or edema  SKIN: Warm, dry, no lesions  NEURO: No focal deficits, alert and oriented x 3  PSYCHIATRIC: Normal affect and mood      I personally viewed and interpreted the patient's EKG/Telemetry/lab data      ECG 12 Lead  Date/Time: 12/14/2018 10:28 AM  Performed by: Chuck Cabrera MD  Authorized by: Chuck Cabrera MD   Rhythm: sinus rhythm  BPM: 75                ICD-10-CM ICD-9-CM   1. PVCs (premature ventricular contractions) I49.3 427.69   2. AVNRT (AV cornelius re-entry tachycardia) (CMS/[facility]) I47.1 427.89   3. Family history of carotid artery stenosis Z82.49 V17.49       Assessment and Plan:   1. PVCs:  - previously on Flecainide, but now doing well on no AAD.   - continue lifestyle modification      2. AVNRT:  - s/p RFA in 2000  - no recurrences    3. Family History of Carotid Disease:  - stressed importance of controlling lipids, She is dieting and exercising and will have follow up lipid panel with PCP  - continue diet and exercise  - screening carotid duplex (bilateral)    Scribed for Chuck Cabrera MD by Leann Diaz PA-C. 12/14/2018  10:46 AM     Chuck HERNANDEZ MD, personally performed the services described in this " documentation as scribed by the above named individual in my presence, and it is both accurate and complete.  12/14/2018  10:47 AM         No

## 2020-10-23 NOTE — H&P ADULT - NSHPREVIEWOFSYSTEMS_GEN_ALL_CORE
REVIEW OF SYSTEMS  General: denies weakness, malaise  Skin/Breast: no new rash  Ophthalmologic: no change in vision  ENMT: no dysphagia, throat pain or change in hearing  Respiratory and Thorax: no difficulty breathing or chest pain  Cardiovascular: no palpitations or PND, orthopnea  Gastrointestinal: no abdominal pain, normal bowel movement, no dark stools  Genitourinary: no difficulty urinating, no burning urination  Musculoskeletal: no myalgia/arthrlagia  Neurological: no weakness, numbness, change in gait  Psychiatric: no depression, anxiety  Hematology/Lymphatics: denies easy bruising or bleeding  Endocrine: no polyuria, polydipsia REVIEW OF SYSTEMS  General: denies weakness, malaise  Skin/Breast: no new rash  Ophthalmologic: no change in vision  ENMT: no dysphagia, throat pain or change in hearing  Respiratory and Thorax: +exertional SOB  Cardiovascular: no palpitations or PND; + 2pillow orthopnea  Gastrointestinal: no abdominal pain, normal bowel movement, no dark stools  Genitourinary: no difficulty urinating, no burning urination  Musculoskeletal: no myalgia/arthrlagia  Neurological: no weakness, numbness, change in gait  Psychiatric: no depression, anxiety  Hematology/Lymphatics: denies easy bruising or bleeding  Endocrine: no polyuria, polydipsia

## 2020-10-23 NOTE — H&P ADULT - NSHPLABSRESULTS_GEN_ALL_CORE
11.5   6.94  )-----------( 121      ( 23 Oct 2020 11:58 )             36.6     10-23    136  |  101  |  21.0<H>  ----------------------------<  451<H>  4.5   |  23.0  |  0.81    Ca    9.3      23 Oct 2020 11:58  Mg     2.0     10-23    TPro  7.2  /  Alb  3.8  /  TBili  0.5  /  DBili  x   /  AST  21  /  ALT  17  /  AlkPhos  147<H>  10-23    < from: CT Head No Cont (10.23.20 @ 13:50) >    FINDINGS:    Encephalomalacia and gliosis in the left corona radiata and basal ganglia. There is no acute intracranial hemorrhage or mass effect. There are areas of hypodensity in the bilateral hemispheric white matter suggesting white matter microvascular ischemic change. There is cerebral volume loss.    There is no extraaxial fluid collection.    There is no displaced calvarial fracture. Status post bilateral intraocular lens implants. The visualized portions of the paranasal sinuses are well aerated. The mastoid air cells are well aerated.      IMPRESSION: No acute intracranial hemorrhage or mass effect.    < end of copied text >

## 2020-10-23 NOTE — ED ADULT NURSE NOTE - PMH
Breast cancer    Coronary artery disease    CVA (cerebral vascular accident)  R weakness  Diabetes mellitus    Glaucoma    H/O diabetic retinopathy  vision loss L eye  Hiatal hernia    HTN (hypertension)    Hypercholesterolemia    Hypothyroid

## 2020-10-23 NOTE — ED PROVIDER NOTE - OBJECTIVE STATEMENT
Pt is a 70 y.o. F hx CVA, DM, hypothyroid, HLD, CAD, HTN, s/p CABG x4, s/p PCI, presenting with chest pain and altered mental status after nuclear stress test. Pt is a 70 y.o. F hx CVA, DM, hypothyroid, HLD, CAD, HTN, s/p CABG x4, s/p PCI, presenting with chest pain and altered mental status after nuclear stress test. The pt had a scheduled nuclear stress today, SBP noted to be in the 180's, after procedure the pt began complaining of chest pain and became unresponsive, aminophylline given for reversal by NM team. A rapid response was called, pt was evaluated by ED team, BP noted to be 220's/110's. Pt with mild delay to answer questions however speech not slurred, responsive to questions and following commands. During interview, no longer complaining of chest pain. Pt denies fever, sweats, chills, palpitations, changes in vision/hearing, shortness of breath, abdominal pain, n/v.

## 2020-10-23 NOTE — ED PROVIDER NOTE - CLINICAL SUMMARY MEDICAL DECISION MAKING FREE TEXT BOX
Pt is a 70 y.o. F presenting with HTN, chest pain and brief AMS s/p nuclear stress test however at baseline. Labs, meds prn, imaging, ekg, cards consult.

## 2020-10-23 NOTE — CONSULT NOTE ADULT - SUBJECTIVE AND OBJECTIVE BOX
[facility]                                                               Office:  [address] [address]                                                              Telephone: [phone]. Fax:[phone]                                                                        CARDIOLOGY CONSULTATION NOTE                                                                                             Consult requested by:  Dr. House  Reason for Consultation: Chest Pain/ Slurred Speech  History obtained by: Patient and medical record   obtained: No    Chief complaint:    Patient is a 70y old  Female who presents with a chief complaint of chest pain      HPI: Pt 69 y/o female with medical history DM Type I, CAD s/p PCI CABGX4, Stage III CKD, CVA, Anemia, hypothyroidism, who presents to [facility] for sudden onset slurred speech and chest pain during nuclear stress test. Pt states that she has been having worsening dyspnea for past 1-2 years. NST was scheduled today in [facility] for ischemic eval. Towards end of procedure, pt started having midsternal chest pain described as tightness radiating to left side of chest, 8/10, lasting seconds in duration. Pt also had associated palpitations, SOB, slurred speech. Pt BP was checked 200/110 (Wwith no meds on board). Test was terminated RRT called, pt was brought to ED. In ED, CT Head- no acute intracranial hemorrhage or mass effect, Trop#1-negative. NST resulted and shows abnormal study, small mid defects in mid anterior, mid septal walls, reversible suggestive of ischemia hypokinesis basal inferior and inferolateral wall LVEF 69%. Upon assessment pt denies neuro deficits, or chest pain.                  REVIEW OF SYMPTOMS:     CONSTITUTIONAL: No fever, weight loss, or fatigue  ENMT:  No difficulty hearing, tinnitus, vertigo; No sinus or throat pain  NECK: No pain or stiffness  CARDIOVASCULAR: See HPI  RESPIRATORY: No Dyspnea on exertion, Shortness of breath, cough, wheezing  : No dysuria, no hematuria   GI: No dark color stool, no melena, no diarrhea, no constipation, no abdominal pain   NEURO: No headache, no dizziness, no slurred speech   MUSCULOSKELETAL: No joint pain or swelling; No muscle, back, or extremity pain  PSYCH: No agitation, no anxiety.    ALL OTHER REVIEW OF SYSTEMS ARE NEGATIVE.      PREVIOUS DIAGNOSTIC TESTING  ECHO FINDINGS: As per outpt echo 2020- LVEF 61%, mild concentric hypertrophy       STRESS FINDINGS: < from: Nuclear Stress Test-Pharmacologic (10.23.20 @ 09:34) >    IMPRESSIONS:Abnormal Study  * Unable to walk due to covid pandemic precautions  * Chest Pain: No chest pain with administration of  Regadenoson.  * Symptom: No Symptom.  * HR Response: Appropriate.  * BP Response: Appropriate.  * Heart Rhythm: Normal Sinus Rhythm.  * ECG Abnormalities: There were no diagnostic changes.  * Arrhythmia: 1 VPDs occurred.  * As per Stress  lab NP notes: "was Called by nuclear tech  to evaluate patient for elevated heart rate during last  minute of stress pictures. Patients speech was delayed,  NST on monitor. Aminophylline 75mg iv x 1 given, patient  hypertensive hydralzine 10mg IV x1 given. Rapid response  called and patient brought to ED."  * Review of raw data shows: Breast attenuation artifact.,  Diaphragmatic artifact.  * The left ventricle was normal LV size. There are small,  mild defects in mid anterior, mid anteroseptal  walls that  are predominantly reversible, suggestive of ischemia.There  are medium sized, moderate defects in basal and mid  inferior, basal and mid infero-lateral walls that are  minimally reversible suggestive of infarction with  mild  cathy-infarct ischemi  *  Hypokinesis of basal inferior and inferolateral wall.  Poststress LVEF  69%.            CATHETERIZATION FINDINGS: < from: Cardiac Cath Lab - Adult (11.06.18 @ 16:04) >  CORONARY VESSELS: The coronary circulation is right dominant.  LM:   --  Distal left main: There was a 50 % stenosis.  LAD:   --Ostial LAD: There was a 80 % stenosis.  --  Proximal LAD: There was a 50 % stenosis in-stent.  --  Mid LAD: There was a 70 % stenosis in-stent.  CX:   --  Proximal circumflex: There was a 70 % stenosis.  RCA:   --  Proximal RCA: There was a 80 % stenosis.  --  Distal RCA: There was a 50 % stenosis.  GRAFTS:   --  Graft to the distal LAD: The graft was a sequential LIMA.  Graft angiography showed no evidence of disease.  --  Graft to the 1st diagonal: The graft was a LIMA. Graft angiography  showed no evidence of disease.  --  Graft to the 2nd diagonal: The graft was a saphenous vein graft. Graft  angiography showed no evidence of disease.  --  Graft to the 1st obtuse marginal: The graft was a saphenous vein graft.  Graft angiography showed noevidence of disease.  --  Graft to the RPDA: The graft was a sequential vein graft. Graft  angiography showed no evidence of disease.  COMPLICATIONS: No complications occurred during the cath lab visit.  DIAGNOSTIC IMPRESSIONS: Patent Grafts.  No newdisease.        ALLERGIES: Allergies    No Known Allergies    Intolerances          PAST MEDICAL HISTORY  H/O diabetic retinopathy    Hiatal hernia    Breast cancer    Glaucoma    CVA (cerebral vascular accident)    Diabetes mellitus    Hypothyroid    Hypercholesterolemia    Coronary artery disease    HTN (hypertension)        PAST SURGICAL HISTORY  S/P hernia repair    S/P tonsillectomy    S/P appendectomy    S/P cardiac cath    S/P CABG x 4    S/P breast reconstruction, bilateral    S/P mastectomy        FAMILY HISTORY:  No pertinent family history in first degree relatives        SOCIAL HISTORY:    CIGARETTES:   past smoker quit 30 years ago, smoked for 15 years 1ppd  ALCOHOL: past use  DRUGS: Denies      CURRENT MEDICATIONS:  losartan 100 milliGRAM(s) Oral once           HOME MEDICATIONS:    aspirin 81 mg oral tablet: 1 tab(s) orally once a day (23 Oct 2020 13:14)  hydrALAZINE 50 mg oral tablet: 1 tab(s) orally 4 times a day (23 Oct 2020 13:14)  Lantus 100 units/mL subcutaneous solution: 25 unit(s) subcutaneous once a day (23 Oct 2020 13:14)  losartan 100 mg oral tablet: 1 tab(s) orally once a day (23 Oct 2020 13:14)  NovoLOG 100 units/mL subcutaneous solution: 10 unit(s) subcutaneous 3 times a day (23 Oct 2020 13:14)      Vital Signs Last 24 Hrs  T(C): 36.6 (23 Oct 2020 11:21), Max: 36.6 (23 Oct 2020 11:21)  T(F): 97.8 (23 Oct 2020 11:21), Max: 97.8 (23 Oct 2020 11:21)  HR: 71 (23 Oct 2020 11:21) (71 - 71)  BP: 183/87 (23 Oct 2020 13:11) (183/87 - 210/91)  RR: 20 (23 Oct 2020 11:21) (20 - 20)  SpO2: 100% (23 Oct 2020 11:21) (100% - 100%)      PHYSICAL EXAM:  Constitutional: Comfortable . No acute distress.   HEENT: Atraumatic and normocephalic , neck is supple . no JVD. +left carotid bruit. PEERL   CNS: A&Ox3. No focal deficits. EOMI.   Lymph Nodes: Cervical : Not palpable.  Respiratory: CTAB   Cardiovascular: S1S2 RRR. + sys. murmur radiating to carotid, 2nd intercostal space   Gastrointestinal: Soft non-tender and non distended . +Bowel sounds. negative Suresh's sign.  Extremities: No edema.   Psychiatric: Calm . no agitation.  Skin: No skin rash/ulcers visualized to face, hands or feet.    Intake and output:     LABS:                        11.5   6.94  )-----------( 121      ( 23 Oct 2020 11:58 )             36.6     10-23    136  |  101  |  21.0<H>  ----------------------------<  451<H>  4.5   |  23.0  |  0.81    Ca    9.3      23 Oct 2020 11:58  Mg     2.0     10-23    TPro  7.2  /  Alb  3.8  /  TBili  0.5  /  DBili  x   /  AST  21  /  ALT  17  /  AlkPhos  147<H>  10-23    CARDIAC MARKERS ( 23 Oct 2020 11:58 )  x     / 0.01 ng/mL / x     / x     / x        ;p-BNP=  PT/INR - ( 23 Oct 2020 11:58 )   PT: 11.8 sec;   INR: 1.02 ratio         PTT - ( 23 Oct 2020 11:58 )  PTT:27.1 sec      INTERPRETATION OF TELEMETRY: Pt not on tele at time of assessment  ECG:  NSR HR @ 70, q wave V1    RADIOLOGY & ADDITIONAL STUDIES:    X-ray:  < from: Xray Chest 1 View- PORTABLE-Urgent (10.23.20 @ 12:51) >  Heart is magnified by technique.    Sternotomy again noted.    Lung fields and pleural surfaces are unremarkable.    Repair of the left glenoid again seen.    Chest is similar to January 27, 2019.    IMPRESSION: No acute finding or change.        CT scan: < from: CT Head No Cont (10.23.20 @ 13:50) >  FINDINGS:    Encephalomalacia and gliosis in the left corona radiata and basal ganglia. There is no acute intracranial hemorrhage or mass effect. There are areas of hypodensity in the bilateral hemispheric white matter suggesting white matter microvascular ischemic change. There is cerebral volume loss.    There is no extraaxial fluid collection.    There is no displaced calvarial fracture. Status post bilateral intraocular lens implants. The visualized portions of the paranasal sinuses are well aerated. The mastoid air cells are well aerated.      IMPRESSION: No acute intracranial hemorrhage or mass effect.

## 2020-10-23 NOTE — CONSULT NOTE ADULT - SUBJECTIVE AND OBJECTIVE BOX
CHIEF COMPLAINT:    HPI: 70yFemale  History of Present Illness:     69 yo female with CABG and CAD (PCI in the past) who presents to the hospital after arriving for outpatient NST at which she had an RRT. Patient was found to have hypertensive emergency with change in mental status. Patient describes that during event she had band-like tightness across her chest associated with SOB. Patient had CABG in 2013 and last PCI 2 years ago she self reports.     Patient for past 3 months has been having exertional SOB but denies chest pain, diaphoresis, orthopnea, leg swelling. She has been sleeping with 2 pillows at night for past year. Denies fever, chills, cough. Patient saw her cardiologist in follow up recently and was recommended for NST which was today. During NST patient BP was reportedly >200 SBP and >100 DBP. She received adenosine during stress test which she was given reversal with aminophylline. Patient at this time is alert and oriented and feels her normal self.      PAST MEDICAL & SURGICAL HISTORY:  H/O diabetic retinopathy  vision loss L eye    Hiatal hernia    Breast cancer    Glaucoma    CVA (cerebral vascular accident)  R weakness    Diabetes mellitus    Hypothyroid    Hypercholesterolemia    Coronary artery disease    HTN (hypertension)    S/P hernia repair  hiatal    S/P tonsillectomy    S/P appendectomy    S/P cardiac cath  with stents    S/P CABG x 4    S/P breast reconstruction, bilateral    S/P mastectomy      MEDICATIONS  (STANDING):  losartan 100 milliGRAM(s) Oral once    MEDICATIONS  (PRN):    Allergies    No Known Allergies    Intolerances        FAMILY HISTORY:  No pertinent family history in first degree relatives            SOCIAL HISTORY:    Tobacco:  no  Alcohol:  no  Drugs:  no        REVIEW OF SYSTEMS:    Relevant systems are negative except as noted in the chart, HPI, and PMH      VITAL SIGNS:  Vital Signs Last 24 Hrs  T(C): 36.6 (23 Oct 2020 11:21), Max: 36.6 (23 Oct 2020 11:21)  T(F): 97.8 (23 Oct 2020 11:21), Max: 97.8 (23 Oct 2020 11:21)  HR: 71 (23 Oct 2020 11:21) (71 - 71)  BP: 183/87 (23 Oct 2020 13:11) (183/87 - 210/91)  BP(mean): --  RR: 20 (23 Oct 2020 11:21) (20 - 20)  SpO2: 100% (23 Oct 2020 11:21) (100% - 100%)    PHYSICAL EXAMINATION:    General: Well-developed, well nourished, in no acute distress.  Cardiac:  Regular rate and rhythm. No carotid bruits appreciated.  Eyes: Fundoscopic examination was deferred.  Neurologic:  - Mental Status:  Alert, awake, oriented to person, place, and time; Speech is fluent. Language is normal. Follows commands well.  Insight and knowledge appear appropriate.  Cranial Nerves II-XII:    II:  Visual acuity is normal for age ; Visual fields are full to confrontation; Pupils are equal, round, and reactive to light.  III, IV, VI:  Extraocular movements are intact without nystagmus.  V:  Facial sensation is intact in the V1-V3 distribution bilaterally.  VII:  Face is symmetric with normal eye closure and smile  VIII:  Hearing is grossly intact  IX, X, XII:  speech is clear  XI:  Head turning and shoulder shrug are intact.  - Motor:  Strength is 5/5 x 4.   There is no pronator drift. .  - Reflexes:  2+ and symmetric at the knees.  Plantar responses flexor.  - Sensory:  Symmetric to light touch  - Coordination:  Finger-nose-finger is normal. Rapid alternating hand and foot  movements are intact. Dexterity appears normal      LABS:                          11.5   6.94  )-----------( 121      ( 23 Oct 2020 11:58 )             36.6     23 Oct 2020 11:58    136    |  101    |  21.0   ----------------------------<  451    4.5     |  23.0   |  0.81     Ca    9.3        23 Oct 2020 11:58  Mg     2.0       23 Oct 2020 11:58    TPro  7.2    /  Alb  3.8    /  TBili  0.5    /  DBili  x      /  AST  21     /  ALT  17     /  AlkPhos  147    23 Oct 2020 11:58    LIVER FUNCTIONS - ( 23 Oct 2020 11:58 )  Alb: 3.8 g/dL / Pro: 7.2 g/dL / ALK PHOS: 147 U/L / ALT: 17 U/L / AST: 21 U/L / GGT: x           PT/INR - ( 23 Oct 2020 11:58 )   PT: 11.8 sec;   INR: 1.02 ratio         PTT - ( 23 Oct 2020 11:58 )  PTT:27.1 sec      RADIOLOGY & ADDITIONAL STUDIES:      < from: CT Head No Cont (10.23.20 @ 13:50) >  FINDINGS:    Encephalomalacia and gliosis in the left corona radiata and basal ganglia. There is no acute intracranial hemorrhage or mass effect. There are areas of hypodensity in the bilateral hemispheric white matter suggesting white matter microvascular ischemic change. There is cerebral volume loss.    There is no extraaxial fluid collection.    There is no displaced calvarial fracture. Status post bilateral intraocular lens implants. The visualized portions of the paranasal sinuses are well aerated. The mastoid air cells are well aerated.      IMPRESSION: No acute intracranial hemorrhage or mass effect.        < end of copied text >  IMPRESSION:    Transient altered consciousness associated with  acute sever hypertension. c/w hypertensive encephalopathy  Currently asymptomatic.. dobut primary CNS event such as TIA/CVA or seizure.    Ct c/w her previous  h/o cerebrovascular diease  PLAN:  1. Medical and Cardiac evaluation and treatment as indicated  2.  EEG, MRA/MRAs  3.  Ideally, would defer cardiac cath until after the Neurologic work up is complete, but urgency is at the discretion of cardiology  4.  5.

## 2020-10-23 NOTE — ED PROVIDER NOTE - PHYSICAL EXAMINATION
General: well appearing, NAD  Head:  NC, AT  Eyes: EOMI, PERRLA, no scleral icterus  Ears: no erythema/drainage  Nose: midline, no bleeding/drainage  Throat: MMM  Cardiac: RRR, no m/r/g, no lower extremity edema  Respiratory: CTABL, no wheezes/rales/rhonchi, equal chest wall expansions  Abdomen: soft, ND, NT, no rebound tenderness, no guarding, nonperitonitic  MSK/Vascular: full ROM, distal pulses intact, soft compartments, warm extremities  Neuro: AAOx3, =strength 5/5, sensation to light touch intact, finger to nose coordination intact, cranial nerves 2-12 intact, coordination intact, gait stable  Psych: calm, cooperative, normal affect

## 2020-10-23 NOTE — ED ADULT NURSE NOTE - NSIMPLEMENTINTERV_GEN_ALL_ED
Implemented All Universal Safety Interventions:  [place] to call system. Call bell, personal items and telephone within reach. Instruct patient to call for assistance. Room bathroom lighting operational. Non-slip footwear when patient is off stretcher. Physically safe environment: no spills, clutter or unnecessary equipment. Stretcher in lowest position, wheels locked, appropriate side rails in place.

## 2020-10-23 NOTE — ED PROVIDER NOTE - PSH
S/P appendectomy    S/P breast reconstruction, bilateral    S/P CABG x 4    S/P cardiac cath  with stents  S/P hernia repair  hiatal  S/P mastectomy    S/P tonsillectomy

## 2020-10-23 NOTE — CONSULT NOTE ADULT - ATTENDING COMMENTS
Patient was seen and examined at bedside post Nuclear stress test (referred for progressively worsening shortness of breath)   During stress test post Regadenoson patient had tachycardia and slurred speech with blood pressure 220/110 and thus was sent to the ER after RRT was called. Patient is at baseline mental status.   CT head done No acute intracranial hemorrhage or mass effect.  BP: 210/93 ->183/87  Cardiac Cath 2018: LM:   --  Distal left main: There was a 50 % stenosis.  LAD:   --  Ostial LAD: There was a 80 % stenosis. --  Proximal LAD: There was a 50 % stenosis in-stent. --  Mid LAD: There was a 70 % stenosis in-stent.  CX:   --  Proximal circumflex: There was a 70 % stenosis.  RCA:   --  Proximal RCA: There was a 80 % stenosis. --  Distal RCA: There was a 50 % stenosis.  GRAFTS:   --  Graft to the distal LAD: The graft was a sequential LIMA. Graft angiography showed no evidence of disease.  --  Graft to the 1st diagonal: The graft was a LIMA. Graft angiography showed no evidence of disease.  --  Graft to the 2nd diagonal: The graft was a saphenous vein graft. Graft angiography showed no evidence of disease.  --  Graft to the 1st obtuse marginal: The graft was a saphenous vein graft. Graft angiography showed no evidence of disease.  --  Graft to the RPDA: The graft was a sequential vein graft. Graft angiography showed no evidence of disease.  COMPLICATIONS: No complications occurred during the cath lab visit.  DIAGNOSTIC IMPRESSIONS: Patent Grafts. No new disease.    PMH: DM Type I, CAD s/p PCI CABGX4, CVA, Anemia, hypothyroidism, CVA (2004 and TIA last year)  and Breast CA ( s/p chemo/ radiation and bilateral mastectomy)    Plan:   1) Cardiac  a) Dyspnea on exertion possible anginal equivalent   - patient is euvolemic on physical examination  - patient had Nuclear stress testing done today which is resulted as above, in light of acute neurologic event would defer LHC until full neurologic work up is completed   - obtain proBNP and TTE to assess any interval changes in LV function and valvulopathy   - continue with current home regimen for blood pressure  - continue with ASA and statin    b) CAD s/p CABG (LIMA -> 1st diag/ SVG ->2nd diag/SVG-> OM1 and RPDA)   - dyspnea on exertion likely anginal equivalent with abnormal nuclear stress test    - as stated above plan for cath post neuro work up     c) HTN   - blood pressure uncontrolled   - continue with home blood pressure regimen   - Norvasc 10mg po q daily / Coreg 12.5mg po q 12hrs (if blood pressure remain uncontrolled may consider uptitrating) / Hydralazine 50mg po q 6hrs / Losartan 100 mg po q daily     2) Neurology   a) TIA ( w/ slurred speech), prior h/o CVA in 2004   - currently baseline mental status   - CT head negative for any acute intracranial pathology   - Neurology consult appreciated   - MRI /MRAs, w/ EEG   - post Neurology work up would arrange for C   - continue with ASA and Lipitor    Thank You  Gavi Sahu D.O.   Cardiology Patient was seen and examined at bedside post Nuclear stress test (referred for progressively worsening shortness of breath)   During stress test post Regadenoson patient had tachycardia and slurred speech with blood pressure 220/110 and thus was sent to the ER after RRT was called. Patient is at baseline mental status.   CT head done No acute intracranial hemorrhage or mass effect.  BP: 210/93 ->183/87  Cardiac Cath 2018: LM:   --  Distal left main: There was a 50 % stenosis.  LAD:   --  Ostial LAD: There was a 80 % stenosis. --  Proximal LAD: There was a 50 % stenosis in-stent. --  Mid LAD: There was a 70 % stenosis in-stent.  CX:   --  Proximal circumflex: There was a 70 % stenosis.  RCA:   --  Proximal RCA: There was a 80 % stenosis. --  Distal RCA: There was a 50 % stenosis.  GRAFTS:   --  Graft to the distal LAD: The graft was a sequential LIMA. Graft angiography showed no evidence of disease.  --  Graft to the 1st diagonal: The graft was a LIMA. Graft angiography showed no evidence of disease.  --  Graft to the 2nd diagonal: The graft was a saphenous vein graft. Graft angiography showed no evidence of disease.  --  Graft to the 1st obtuse marginal: The graft was a saphenous vein graft. Graft angiography showed no evidence of disease.  --  Graft to the RPDA: The graft was a sequential vein graft. Graft angiography showed no evidence of disease.  COMPLICATIONS: No complications occurred during the cath lab visit.  DIAGNOSTIC IMPRESSIONS: Patent Grafts. No new disease.    PMH: DM Type I, CAD s/p PCI CABGX4, CVA, Anemia, hypothyroidism, CVA (2004 and TIA last year)  and Breast CA ( s/p chemo/ radiation and bilateral mastectomy)    Plan:   1) Cardiac  a) Dyspnea on exertion possible anginal equivalent   - patient is euvolemic on physical examination  - patient had Nuclear stress testing done today which is resulted as above, in light of acute neurologic event would defer LHC until full neurologic work up is completed   - obtain proBNP and TTE to assess any interval changes in LV function and valvulopathy   - continue with current home regimen for blood pressure  - continue with ASA and statin    b) CAD s/p CABG (LIMA -> 1st diag/ SVG ->2nd diag/SVG-> OM1 and RPDA)   - dyspnea on exertion likely anginal equivalent with abnormal nuclear stress test    - as stated above plan for cath post neuro work up     c) HTN Emergency   - blood pressure uncontrolled   - continue with home blood pressure regimen   - Norvasc 10mg po q daily / Coreg 12.5mg po q 12hrs (if blood pressure remain uncontrolled may consider uptitrating) / Hydralazine 50mg po q 6hrs / Losartan 100 mg po q daily     2) Neurology   a) TIA ( w/ slurred speech), prior h/o CVA in 2004   - currently baseline mental status   - CT head negative for any acute intracranial pathology   - Neurology consult appreciated   - MRI /MRAs, w/ EEG   - post Neurology work up would arrange for C   - continue with ASA and Lipitor    Thank You  Gavi Sahu D.O.   Cardiology

## 2020-10-23 NOTE — CONSULT NOTE ADULT - ASSESSMENT
Pt 71 y/o female with medical history DM Type I, CAD s/p PCI CABGX4, Stage III CKD, CVA, Anemia, hypothyroidism, who presents to [facility]-ED for sudden onset slurred speech and chest pain during nuclear stress test. Pt states that she has been having worsening dyspnea for past 1-2 years. NST was scheduled today in [facility] for ischemic eval. Towards end of procedure, pt started having midsternal chest pain described as tightness radiating to left side of chest, 8/10, lasting seconds in duration. Pt also had associated palpitations, SOB, slurred speech. Pt BP was checked 200/110 (Wwith no meds on board). Test was terminated RRT called, pt was brought to ED. In ED, CT Head- no acute intracranial hemorrhage or mass effect, Trop#1-negative. NST resulted and shows abnormal study, small mid defects in mid anterior, mid septal walls, reversible suggestive of ischemia hypokinesis basal inferior and inferolateral wall LVEF 69%. Upon assessment pt denies neuro deficits, or chest pain.        Chest Pain  -Trop#1-negative  -ECG- NSR HR @ 70, q wave V1  -NST-abnormal study, small mid defects in mid anterior, mid septal walls, reversible suggestive of ischemia hypokinesis basal inferior and inferolateral wall LVEF 69%.  -If cleared by neuro, plan for cath today 10/23  -Echo-ordered and pending  -Maintain NPO    Dysarthria  -CT Head- no acute intracranial hemorrhage or mass effect  -Neuro eval recommended to clear for cath Pt 71 y/o female with medical history DM Type I, CAD s/p PCI CABGX4, Stage III CKD, CVA, Anemia, hypothyroidism, who presents to [facility]-ED for sudden onset slurred speech and chest pain during nuclear stress test. Pt states that she has been having worsening dyspnea for past 1-2 years. NST was scheduled today in [facility] for ischemic eval. Towards end of procedure, pt started having midsternal chest pain described as tightness radiating to left side of chest, 8/10, lasting seconds in duration. Pt also had associated palpitations, SOB, slurred speech. Pt BP was checked 200/110 (Wwith no meds on board). Test was terminated RRT called, pt was brought to ED. In ED, CT Head- no acute intracranial hemorrhage or mass effect, Trop#1-negative. NST resulted and shows abnormal study, small mid defects in mid anterior, mid septal walls, reversible suggestive of ischemia hypokinesis basal inferior and inferolateral wall LVEF 69%. Upon assessment pt denies neuro deficits, or chest pain.        Chest Pain  -Trop#1-negative  -ECG- NSR HR @ 70, q wave V1  -NST-abnormal study, small mid defects in mid anterior, mid septal walls, reversible suggestive of ischemia hypokinesis basal inferior and inferolateral wall LVEF 69%.  -If cleared by neuro, otherwise if further Neurology work up is needed will defer cath until Neurology work up is completed   -Echo-ordered and pending  -Maintain NPO    Dysarthria  -CT Head- no acute intracranial hemorrhage or mass effect  -Neuro eval recommended to clear for cath

## 2020-10-23 NOTE — H&P ADULT - HISTORY OF PRESENT ILLNESS
69 yo female with CABG and CAD (PCI in the past) who presents to the hospital after arriving for outpatient NST at which she had an RRT. was found to have hypertensive emergency with change in mental status. 69 yo female with CABG and CAD (PCI in the past) who presents to the hospital after arriving for outpatient NST at which she had an RRT. Patient was found to have hypertensive emergency with change in mental status. Patient describes that during event she had band-like tightness across her chest associated with SOB. Patient had CABG in 2013 and last PCI 2 years ago she self reports.     Patient for past 3 months has been having exertional SOB but denies chest pain, diaphoresis, orthopnea, leg swelling. She has been sleeping with 2 pillows at night for past year. Denies fever, chills, cough. Patient saw her cardiologist in follow up recently and was recommended for NST which was today. During NST patient BP was reportedly >200 SBP and >100 DBP. She received adenosine during stress test which she was given reversal with aminophylline. Patient at this time is alert and oriented and feels her normal self. 71 yo female with CABG and CAD (PCI in the past), HTN, DM II (on insulin), hypothyroidism who presents to the hospital after arriving for outpatient NST at which she had an RRT. Patient was found to have hypertensive emergency with change in mental status. Patient describes that during event she had band-like tightness across her chest associated with SOB. Patient had CABG in 2013 and last PCI 2 years ago she self reports.     Patient for past 3 months has been having exertional SOB but denies chest pain, diaphoresis, orthopnea, leg swelling. She has been sleeping with 2 pillows at night for past year. Denies fever, chills, cough. Patient saw her cardiologist in follow up recently and was recommended for NST which was today. During NST patient BP was reportedly >200 SBP and >100 DBP. She received adenosine during stress test which she was given reversal with aminophylline. Patient at this time is alert and oriented and feels her normal self.

## 2020-10-23 NOTE — ED PROVIDER NOTE - PROGRESS NOTE DETAILS
Chantell Eldridge, Resident: Cardiology NP evaluated pt, planning for cardiac catheterization today. Requiring neuro clearance prior to procedure. Pt feeling well, no complaints. PE: NAD, S1/S2 noted, lungs CTABL, abdomen soft NT/ND and nonperitonitic, neurovasc intact. BP trending down, home meds given earlier. Pt understands and agrees with plan. Chantell Eldridge, Resident: Dr. Baig, neuro attending, evaluating the pt.

## 2020-10-23 NOTE — CHART NOTE - NSCHARTNOTEFT_GEN_A_CORE
RN notified medicine PA of diet status. Pt is NPO after midnight due to possible cath tomorrow. After reviewing the chart as per Cardio note, defer cath until neurology work up is complete. As per neurology note, plan is to complete neuro work up prior to cath unless any acute changes occur. Also cath lab was called and patient is not listed for cath tomorrow.     VSS:  HR: 64 BP: 164/79 RR: 18 SP02: 98 on RA     PLAN:   - Will remain NPO after midnight   - Added DASH diet for tomorrow starting at 11am after determination of cath status  - Pending  neuro clearance.   - Cardio following    RN instructed to notify provider if any changes occur in pt status. Will continue to monitor RN notified medicine PA of diet status. Pt is NPO after midnight due to possible cath tomorrow. After reviewing the chart as per Cardio note, defer cath until neurology work up is complete. As per neurology note, plan is to complete neuro work up prior to cath unless any acute changes occur. Also cath lab was called and patient is not listed for cath tomorrow.     VSS:  HR: 64 BP: 164/79 RR: 18 SP02: 98 on RA     PLAN:   - Changed diet from NPO to NPO after midnight   - Pending  neuro clearance.   - Cardio following    RN instructed to notify provider if any changes occur in pt status. Will continue to monitor

## 2020-10-24 DIAGNOSIS — I10 ESSENTIAL (PRIMARY) HYPERTENSION: ICD-10-CM

## 2020-10-24 DIAGNOSIS — I63.9 CEREBRAL INFARCTION, UNSPECIFIED: ICD-10-CM

## 2020-10-24 LAB
A1C WITH ESTIMATED AVERAGE GLUCOSE RESULT: 9.2 % — HIGH (ref 4–5.6)
ESTIMATED AVERAGE GLUCOSE: 217 MG/DL — HIGH (ref 68–114)
GLUCOSE BLDC GLUCOMTR-MCNC: 178 MG/DL — HIGH (ref 70–99)
GLUCOSE BLDC GLUCOMTR-MCNC: 250 MG/DL — HIGH (ref 70–99)
GLUCOSE BLDC GLUCOMTR-MCNC: 255 MG/DL — HIGH (ref 70–99)
GLUCOSE BLDC GLUCOMTR-MCNC: 266 MG/DL — HIGH (ref 70–99)
GLUCOSE BLDC GLUCOMTR-MCNC: 269 MG/DL — HIGH (ref 70–99)
GLUCOSE BLDC GLUCOMTR-MCNC: 307 MG/DL — HIGH (ref 70–99)
GLUCOSE BLDC GLUCOMTR-MCNC: 389 MG/DL — HIGH (ref 70–99)

## 2020-10-24 PROCEDURE — 99232 SBSQ HOSP IP/OBS MODERATE 35: CPT

## 2020-10-24 PROCEDURE — 99233 SBSQ HOSP IP/OBS HIGH 50: CPT

## 2020-10-24 RX ORDER — HYDRALAZINE HCL 50 MG
50 TABLET ORAL EVERY 8 HOURS
Refills: 0 | Status: DISCONTINUED | OUTPATIENT
Start: 2020-10-24 | End: 2020-10-31

## 2020-10-24 RX ORDER — CARVEDILOL PHOSPHATE 80 MG/1
25 CAPSULE, EXTENDED RELEASE ORAL EVERY 12 HOURS
Refills: 0 | Status: DISCONTINUED | OUTPATIENT
Start: 2020-10-24 | End: 2020-10-24

## 2020-10-24 RX ORDER — ISOSORBIDE MONONITRATE 60 MG/1
30 TABLET, EXTENDED RELEASE ORAL DAILY
Refills: 0 | Status: DISCONTINUED | OUTPATIENT
Start: 2020-10-24 | End: 2020-10-31

## 2020-10-24 RX ORDER — SODIUM CHLORIDE 9 MG/ML
500 INJECTION INTRAMUSCULAR; INTRAVENOUS; SUBCUTANEOUS ONCE
Refills: 0 | Status: COMPLETED | OUTPATIENT
Start: 2020-10-24 | End: 2020-10-24

## 2020-10-24 RX ORDER — ONDANSETRON 8 MG/1
4 TABLET, FILM COATED ORAL EVERY 6 HOURS
Refills: 0 | Status: DISCONTINUED | OUTPATIENT
Start: 2020-10-24 | End: 2020-10-24

## 2020-10-24 RX ORDER — SENNA PLUS 8.6 MG/1
1 TABLET ORAL AT BEDTIME
Refills: 0 | Status: DISCONTINUED | OUTPATIENT
Start: 2020-10-24 | End: 2020-10-31

## 2020-10-24 RX ORDER — ONDANSETRON 8 MG/1
4 TABLET, FILM COATED ORAL EVERY 6 HOURS
Refills: 0 | Status: DISCONTINUED | OUTPATIENT
Start: 2020-10-24 | End: 2020-10-31

## 2020-10-24 RX ADMIN — Medication 8 UNIT(S): at 11:42

## 2020-10-24 RX ADMIN — Medication 81 MILLIGRAM(S): at 11:42

## 2020-10-24 RX ADMIN — Medication 6: at 21:20

## 2020-10-24 RX ADMIN — GABAPENTIN 100 MILLIGRAM(S): 400 CAPSULE ORAL at 16:51

## 2020-10-24 RX ADMIN — INSULIN GLARGINE 15 UNIT(S): 100 INJECTION, SOLUTION SUBCUTANEOUS at 21:21

## 2020-10-24 RX ADMIN — Medication 2: at 11:42

## 2020-10-24 RX ADMIN — Medication 6: at 07:59

## 2020-10-24 RX ADMIN — GABAPENTIN 100 MILLIGRAM(S): 400 CAPSULE ORAL at 04:56

## 2020-10-24 RX ADMIN — Medication 137 MICROGRAM(S): at 04:56

## 2020-10-24 RX ADMIN — SENNA PLUS 1 TABLET(S): 8.6 TABLET ORAL at 21:20

## 2020-10-24 RX ADMIN — ATORVASTATIN CALCIUM 40 MILLIGRAM(S): 80 TABLET, FILM COATED ORAL at 21:20

## 2020-10-24 RX ADMIN — Medication 50 MILLIGRAM(S): at 21:20

## 2020-10-24 RX ADMIN — SODIUM CHLORIDE 500 MILLILITER(S): 9 INJECTION INTRAMUSCULAR; INTRAVENOUS; SUBCUTANEOUS at 14:00

## 2020-10-24 RX ADMIN — Medication 8 UNIT(S): at 16:50

## 2020-10-24 RX ADMIN — LOSARTAN POTASSIUM 100 MILLIGRAM(S): 100 TABLET, FILM COATED ORAL at 04:56

## 2020-10-24 RX ADMIN — CARVEDILOL PHOSPHATE 12.5 MILLIGRAM(S): 80 CAPSULE, EXTENDED RELEASE ORAL at 04:55

## 2020-10-24 RX ADMIN — Medication 50 MILLIGRAM(S): at 04:56

## 2020-10-24 RX ADMIN — ISOSORBIDE MONONITRATE 30 MILLIGRAM(S): 60 TABLET, EXTENDED RELEASE ORAL at 11:42

## 2020-10-24 RX ADMIN — Medication 8: at 16:51

## 2020-10-24 RX ADMIN — Medication 50 MILLIGRAM(S): at 11:42

## 2020-10-24 NOTE — RAPID RESPONSE TEAM SUMMARY - NSSITUATIONBACKGROUNDRRT_GEN_ALL_CORE
Patient admitted for chest pain and hypertension  beginning during NST yesterday. Patient admitted for chest pain and hypertension  beginning during NST yesterday. This AM rapid response called for altered mental status with bradycardia and hypotension Patient admitted for chest pain and hypertension  beginning during NST yesterday. This AM rapid response called for altered mental status with bradycardia and hypotension.    Pt received hydralazine 50mg, Cozaar 100 @ 0500   Hydralazine 50mg and Imdur 30mg @1145

## 2020-10-24 NOTE — PROGRESS NOTE ADULT - SUBJECTIVE AND OBJECTIVE BOX
CHIEF COMPLAINT:  dizziness/slurred speech    INTERVAL HISTORY:  According to staff patient was doing well this morning. Her blood pressure was approximately 190 systolic. Few minutes ago rapid response was called as patient became less responsive. BP was in 80s systolic. VITAL SIGNS:  Vital Signs Last 24 Hrs  T(C): 36.4 (24 Oct 2020 13:13), Max: 37.3 (23 Oct 2020 18:45)  T(F): 97.5 (24 Oct 2020 13:13), Max: 99.1 (23 Oct 2020 18:45)  HR: 58 (24 Oct 2020 13:13) (50 - 70)  BP: 110/69 (24 Oct 2020 13:13) (82/49 - 224/108)  BP(mean): --  RR: 16 (24 Oct 2020 13:13) (16 - 20)  SpO2: 95% (24 Oct 2020 13:13) (95% - 100%)    PHYSICAL EXAMINATION:  General: Well-developed, well nourished, in no acute distress.  Eyes: Conjunctiva and sclera clear.    Neurological Exam:    On exam she is awakening/ Some slowness in response but knows she is in the hospital and it is 2020. Follows two step commands with right and left confusion. vision intact to confrontation. EOMI, PERRLA, no facial droop. Other CNs 2-12 intact. On motor exam all muscle groups are at least 5-/5 symmetric . Babinski negative.       MEDS:  MEDICATIONS  (STANDING):  aspirin  chewable 81 milliGRAM(s) Oral daily  atorvastatin 40 milliGRAM(s) Oral at bedtime  dextrose 5%. 1000 milliLiter(s) (50 mL/Hr) IV Continuous <Continuous>  dextrose 50% Injectable 12.5 Gram(s) IV Push once  dextrose 50% Injectable 25 Gram(s) IV Push once  dextrose 50% Injectable 25 Gram(s) IV Push once  gabapentin 100 milliGRAM(s) Oral two times a day  hydrALAZINE 50 milliGRAM(s) Oral every 8 hours  insulin glargine Injectable (LANTUS) 15 Unit(s) SubCutaneous at bedtime  insulin lispro (ADMELOG) corrective regimen sliding scale   SubCutaneous three times a day before meals  insulin lispro (ADMELOG) corrective regimen sliding scale   SubCutaneous at bedtime  insulin lispro Injectable (ADMELOG) 8 Unit(s) SubCutaneous three times a day before meals  isosorbide   mononitrate ER Tablet (IMDUR) 30 milliGRAM(s) Oral daily  levothyroxine 137 MICROGram(s) Oral daily  losartan 100 milliGRAM(s) Oral daily  ondansetron Injectable 4 milliGRAM(s) IV Push every 6 hours  sodium chloride 0.9% Bolus 500 milliLiter(s) IV Bolus once    MEDICATIONS  (PRN):  dextrose 40% Gel 15 Gram(s) Oral once PRN Blood Glucose LESS THAN 70 milliGRAM(s)/deciliter  glucagon  Injectable 1 milliGRAM(s) IntraMuscular once PRN Glucose LESS THAN 70 milligrams/deciliter      LABS:                          11.5   6.94  )-----------( 121      ( 23 Oct 2020 11:58 )             36.6     10-23    136  |  101  |  21.0<H>  ----------------------------<  451<H>  4.5   |  23.0  |  0.81    Ca    9.3      23 Oct 2020 11:58  Mg     2.0     10-23    TPro  7.2  /  Alb  3.8  /  TBili  0.5  /  DBili  x   /  AST  21  /  ALT  17  /  AlkPhos  147<H>  10-23    LIVER FUNCTIONS - ( 23 Oct 2020 11:58 )  Alb: 3.8 g/dL / Pro: 7.2 g/dL / ALK PHOS: 147 U/L / ALT: 17 U/L / AST: 21 U/L / GGT: x               RADIOLOGY & ADDITIONAL STUDIES:     EXAM:  CT BRAIN                          PROCEDURE DATE:  10/23/2020          INTERPRETATION:  CLINICAL INFORMATION: ams. . .    TECHNIQUE: Sequential axial images were obtained from the vertex to the skull base without intravenous contrast. Coronal and sagittal reformations were obtained.    COMPARISON: CT head 11/6/2018.  MRI brain 11/8/2018.    FINDINGS:    Encephalomalacia and gliosis in the left corona radiata and basal ganglia. There is no acute intracranial hemorrhage or mass effect. There are areas of hypodensity in the bilateral hemispheric white matter suggesting white matter microvascular ischemic change. There is cerebral volume loss.    There is no extraaxial fluid collection.    There is no displaced calvarial fracture. Status post bilateral intraocular lens implants. The visualized portions of the paranasal sinuses are well aerated. The mastoid air cells are well aerated.      IMPRESSION: No acute intracranial hemorrhage or mass effect.    IMPRESSION & PLAN:

## 2020-10-24 NOTE — PROGRESS NOTE ADULT - ASSESSMENT
A/P: Pt 71 y/o female with medical history DM Type I, CAD s/p PCI CABGX4, Stage III CKD, CVA, Anemia, hypothyroidism, who presents to [facility]-ED for sudden onset slurred speech and chest pain during nuclear stress test. Pt states that she has been having worsening dyspnea for past 1-2 years. NST was scheduled today in [facility] for ischemic eval. Towards end of procedure, pt started having midsternal chest pain described as tightness radiating to left side of chest, 8/10, lasting seconds in duration. Pt also had associated palpitations, SOB, slurred speech. Pt BP was checked 200/110 (Wwith no meds on board). Test was terminated RRT called, pt was brought to ED. In ED, CT Head- no acute intracranial hemorrhage or mass effect, Trop#1-negative. NST resulted and shows abnormal study, small mid defects in mid anterior, mid septal walls, reversible suggestive of ischemia hypokinesis basal inferior and inferolateral wall LVEF 69%. Upon assessment pt denies neuro deficits, or chest pain.        Chest Pain  -Trop#1-negative  -ECG- NSR HR @ 70, q wave V1  -NST-abnormal study, small mid defects in mid anterior, mid septal walls, reversible suggestive of ischemia hypokinesis basal inferior and inferolateral wall LVEF 69%.  - Echo read pending.   - Neuro following, if cleared will have [facility] on Monday.   - From cardiac perspective, cleared for diet.     Dysarthria  -CT Head- no acute intracranial hemorrhage or mass effect  -Neuro eval recommended to clear for cath  - MRI/MRA pending.     Hypertensive Urgency  - Still with very elevated BPs.   - Increase hydralazine to 50mg PO q8 and Coreg to 25mg PO BID.   - Add Imdur 30mg PO qday.   - Further adjustments as needed.     Preliminary evaluation, please await official recommendations by Dr. Mata    A/P: Pt 69 y/o female with medical history DM Type I, CAD s/p PCI CABGX4, Stage III CKD, CVA, Anemia, hypothyroidism, who presents to [facility]-ED for sudden onset slurred speech and chest pain during nuclear stress test. Pt states that she has been having worsening dyspnea for past 1-2 years. NST was scheduled today in [facility] for ischemic eval. Towards end of procedure, pt started having midsternal chest pain described as tightness radiating to left side of chest, 8/10, lasting seconds in duration. Pt also had associated palpitations, SOB, slurred speech. Pt BP was checked 200/110 (Wwith no meds on board). Test was terminated RRT called, pt was brought to ED. In ED, CT Head- no acute intracranial hemorrhage or mass effect, Trop#1-negative. NST resulted and shows abnormal study, small mid defects in mid anterior, mid septal walls, reversible suggestive of ischemia hypokinesis basal inferior and inferolateral wall LVEF 69%. Upon assessment pt denies neuro deficits, or chest pain.        Chest Pain  -Trop#1-negative  -ECG- NSR HR @ 70, q wave V1  -NST-abnormal study, small mid defects in mid anterior, mid septal walls, reversible suggestive of ischemia hypokinesis basal inferior and inferolateral wall LVEF 69%.  - Echo read pending.   - Neuro following, if cleared will have [facility] on Monday.  No indication at this time for urgent LHC  - From cardiac perspective, cleared for diet.     Dysarthria  -CT Head- no acute intracranial hemorrhage or mass effect  -Neuro eval recommended to clear for cath  - MRI/MRA pending.     Hypertensive Urgency  - Still with very elevated BPs.   - Increase hydralazine to 50mg PO q8 and Coreg to 25mg PO BID.   - Add Imdur 30mg PO qday.   - Further adjustments as needed.

## 2020-10-24 NOTE — PROGRESS NOTE ADULT - SUBJECTIVE AND OBJECTIVE BOX
SERGIO PHILLIP  ----------------------------------------  The patient was seen and evaluated for hypertensive emergency. Offers no complaints. Denied chest pain or dyspnea at rest. Does admit to some dyspnea with exertion.    Vital Signs Last 24 Hrs  T(C): 36.8 (24 Oct 2020 11:09), Max: 37.3 (23 Oct 2020 18:45)  T(F): 98.3 (24 Oct 2020 11:09), Max: 99.1 (23 Oct 2020 18:45)  HR: 55 (24 Oct 2020 11:09) (55 - 70)  BP: 158/92 (24 Oct 2020 11:09) (154/80 - 224/108)  BP(mean): --  RR: 18 (24 Oct 2020 11:09) (18 - 20)  SpO2: 98% (24 Oct 2020 11:09) (96% - 100%)    CAPILLARY BLOOD GLUCOSE  POCT Blood Glucose.: 178 mg/dL (24 Oct 2020 11:41)  POCT Blood Glucose.: 255 mg/dL (24 Oct 2020 07:56)  POCT Blood Glucose.: 269 mg/dL (24 Oct 2020 06:09)  POCT Blood Glucose.: 212 mg/dL (23 Oct 2020 21:41)  POCT Blood Glucose.: 358 mg/dL (23 Oct 2020 18:44)    PHYSICAL EXAMINATION:  ----------------------------------------  General appearance: No acute distress, Awake, Alert  HEENT: Normocephalic, Atraumatic, Conjunctiva clear, EOMI  Neck: Supple, No JVD, No tenderness  Lungs: Breath sound equal bilaterally, No wheezes, No rales  Cardiovascular: S1S2, Regular rhythm  Abdomen: Soft, Nontender, Nondistended, No guarding/rebound, Positive bowel sounds  Extremities: No clubbing, No cyanosis, No edema, No calf tenderness  Neuro: Strength equal bilaterally, No tremors  Psychiatric: Appropriate mood, Normal affect    LABORATORY STUDIES:  ----------------------------------------             11.5   6.94  )-----------( 121      ( 23 Oct 2020 11:58 )             36.6     10-23    136  |  101  |  21.0<H>  ----------------------------<  451<H>  4.5   |  23.0  |  0.81    Ca    9.3      23 Oct 2020 11:58  Mg     2.0     10-23    TPro  7.2  /  Alb  3.8  /  TBili  0.5  /  DBili  x   /  AST  21  /  ALT  17  /  AlkPhos  147<H>  10-23    LIVER FUNCTIONS - ( 23 Oct 2020 11:58 )  Alb: 3.8 g/dL / Pro: 7.2 g/dL / ALK PHOS: 147 U/L / ALT: 17 U/L / AST: 21 U/L / GGT: x           PT/INR - ( 23 Oct 2020 11:58 )   PT: 11.8 sec;   INR: 1.02 ratio    PTT - ( 23 Oct 2020 11:58 )  PTT:27.1 sec    CARDIAC MARKERS ( 23 Oct 2020 11:58 )  x     / 0.01 ng/mL / x     / x     / x        MEDICATIONS  (STANDING):  aspirin  chewable 81 milliGRAM(s) Oral daily  atorvastatin 40 milliGRAM(s) Oral at bedtime  carvedilol 25 milliGRAM(s) Oral every 12 hours  dextrose 5%. 1000 milliLiter(s) (50 mL/Hr) IV Continuous <Continuous>  dextrose 50% Injectable 12.5 Gram(s) IV Push once  dextrose 50% Injectable 25 Gram(s) IV Push once  dextrose 50% Injectable 25 Gram(s) IV Push once  gabapentin 100 milliGRAM(s) Oral two times a day  hydrALAZINE 50 milliGRAM(s) Oral every 8 hours  insulin glargine Injectable (LANTUS) 15 Unit(s) SubCutaneous at bedtime  insulin lispro (ADMELOG) corrective regimen sliding scale   SubCutaneous three times a day before meals  insulin lispro (ADMELOG) corrective regimen sliding scale   SubCutaneous at bedtime  insulin lispro Injectable (ADMELOG) 8 Unit(s) SubCutaneous three times a day before meals  isosorbide   mononitrate ER Tablet (IMDUR) 30 milliGRAM(s) Oral daily  levothyroxine 137 MICROGram(s) Oral daily  losartan 100 milliGRAM(s) Oral daily  ondansetron Injectable 4 milliGRAM(s) IV Push every 6 hours    MEDICATIONS  (PRN):  dextrose 40% Gel 15 Gram(s) Oral once PRN Blood Glucose LESS THAN 70 milliGRAM(s)/deciliter  glucagon  Injectable 1 milliGRAM(s) IntraMuscular once PRN Glucose LESS THAN 70 milligrams/deciliter      ASSESSMENT / PLAN:  ----------------------------------------  71 yo female with CABG and CAD (PCI in the past), HTN, DM II (on insulin), hypothyroidism who presents to the hospital after arriving for outpatient NST at which she had an RRT. Patient was found to have hypertensive emergency with change in mental status. Patient describes that during event she had band-like tightness across her chest associated with SOB. Patient had CABG in 2013 and last PCI 2 years ago she self reports.    Hypertensive emergency - Blood pressure improved. Continue close monitoring. On carvedilol, hydralazine, isosorbide, and losartan. Telemetry monitoring.    Slurred speech and confusion - Neurology consultation noted. Awaiting MRI.    Chest pain - Cardiology consultation noted. Planned for cardiac catheterization. On aspirin and atorvastatin.    Diabetes - Insulin coverage, close monitoring of blood glucose levels.    Hypothyroidism - On levothyroxine. SERGIO PHILLIP  ----------------------------------------  The patient was seen and evaluated for hypertensive emergency. Offers no complaints. Denied chest pain or dyspnea at rest. Does admit to some dyspnea with exertion.    Vital Signs Last 24 Hrs  T(C): 36.8 (24 Oct 2020 11:09), Max: 37.3 (23 Oct 2020 18:45)  T(F): 98.3 (24 Oct 2020 11:09), Max: 99.1 (23 Oct 2020 18:45)  HR: 55 (24 Oct 2020 11:09) (55 - 70)  BP: 158/92 (24 Oct 2020 11:09) (154/80 - 224/108)  BP(mean): --  RR: 18 (24 Oct 2020 11:09) (18 - 20)  SpO2: 98% (24 Oct 2020 11:09) (96% - 100%)    CAPILLARY BLOOD GLUCOSE  POCT Blood Glucose.: 178 mg/dL (24 Oct 2020 11:41)  POCT Blood Glucose.: 255 mg/dL (24 Oct 2020 07:56)  POCT Blood Glucose.: 269 mg/dL (24 Oct 2020 06:09)  POCT Blood Glucose.: 212 mg/dL (23 Oct 2020 21:41)  POCT Blood Glucose.: 358 mg/dL (23 Oct 2020 18:44)    PHYSICAL EXAMINATION:  ----------------------------------------  General appearance: No acute distress, Awake, Alert  HEENT: Normocephalic, Atraumatic, Conjunctiva clear, EOMI  Neck: Supple, No JVD, No tenderness  Lungs: Breath sound equal bilaterally, No wheezes, No rales  Cardiovascular: S1S2, Regular rhythm  Abdomen: Soft, Nontender, Nondistended, No guarding/rebound, Positive bowel sounds  Extremities: No clubbing, No cyanosis, No edema, No calf tenderness  Neuro: Strength equal bilaterally, No tremors  Psychiatric: Appropriate mood, Normal affect    LABORATORY STUDIES:  ----------------------------------------             11.5   6.94  )-----------( 121      ( 23 Oct 2020 11:58 )             36.6     10-23    136  |  101  |  21.0<H>  ----------------------------<  451<H>  4.5   |  23.0  |  0.81    Ca    9.3      23 Oct 2020 11:58  Mg     2.0     10-23    TPro  7.2  /  Alb  3.8  /  TBili  0.5  /  DBili  x   /  AST  21  /  ALT  17  /  AlkPhos  147<H>  10-23    LIVER FUNCTIONS - ( 23 Oct 2020 11:58 )  Alb: 3.8 g/dL / Pro: 7.2 g/dL / ALK PHOS: 147 U/L / ALT: 17 U/L / AST: 21 U/L / GGT: x           PT/INR - ( 23 Oct 2020 11:58 )   PT: 11.8 sec;   INR: 1.02 ratio    PTT - ( 23 Oct 2020 11:58 )  PTT:27.1 sec    CARDIAC MARKERS ( 23 Oct 2020 11:58 )  x     / 0.01 ng/mL / x     / x     / x        MEDICATIONS  (STANDING):  aspirin  chewable 81 milliGRAM(s) Oral daily  atorvastatin 40 milliGRAM(s) Oral at bedtime  carvedilol 25 milliGRAM(s) Oral every 12 hours  dextrose 5%. 1000 milliLiter(s) (50 mL/Hr) IV Continuous <Continuous>  dextrose 50% Injectable 12.5 Gram(s) IV Push once  dextrose 50% Injectable 25 Gram(s) IV Push once  dextrose 50% Injectable 25 Gram(s) IV Push once  gabapentin 100 milliGRAM(s) Oral two times a day  hydrALAZINE 50 milliGRAM(s) Oral every 8 hours  insulin glargine Injectable (LANTUS) 15 Unit(s) SubCutaneous at bedtime  insulin lispro (ADMELOG) corrective regimen sliding scale   SubCutaneous three times a day before meals  insulin lispro (ADMELOG) corrective regimen sliding scale   SubCutaneous at bedtime  insulin lispro Injectable (ADMELOG) 8 Unit(s) SubCutaneous three times a day before meals  isosorbide   mononitrate ER Tablet (IMDUR) 30 milliGRAM(s) Oral daily  levothyroxine 137 MICROGram(s) Oral daily  losartan 100 milliGRAM(s) Oral daily  ondansetron Injectable 4 milliGRAM(s) IV Push every 6 hours    MEDICATIONS  (PRN):  dextrose 40% Gel 15 Gram(s) Oral once PRN Blood Glucose LESS THAN 70 milliGRAM(s)/deciliter  glucagon  Injectable 1 milliGRAM(s) IntraMuscular once PRN Glucose LESS THAN 70 milligrams/deciliter      ASSESSMENT / PLAN:  ----------------------------------------  70F with a history of coronary artery disease, CABG, hypertension, diabetes, and hypothyroidism who initially presented to the hospital for a nuclear stress test due to dyspnea on exertion. During the stress test, the patient was noted to have elevated blood pressures(200/110) and difficulty with speech and slow response to questions. A rapid response was activated and the patient transferred to the emergency department for evaluation.    Hypertensive emergency - Blood pressure improved. Continue close monitoring. On carvedilol, hydralazine, isosorbide, and losartan. Telemetry monitoring.    Slurred speech and confusion - Neurology consultation noted. CT of the head was without acute intracranial pathology. Awaiting MRI.    Chest pain - Nuclear stress test results noted small mild reversible defects in mid anterior and mid anteroseptal walls as well as medium sized moderate defects in basal, mid inferior, basal and mid infero-lateral walls. . Cardiology consultation noted. Planned for cardiac catheterization. On aspirin and atorvastatin.    Diabetes - Insulin coverage, close monitoring of blood glucose levels.    Hypothyroidism - On levothyroxine.

## 2020-10-24 NOTE — RAPID RESPONSE TEAM SUMMARY - NSADDTLFINDINGSRRT_GEN_ALL_CORE
12:44 P 50 BP 82/49  13:08 P 62 /72  13:10 P 58 /69  Vital Signs Last 24 Hrs  T(C): 36.4 (24 Oct 2020 13:13), Max: 37.3 (23 Oct 2020 18:45)  T(F): 97.5 (24 Oct 2020 13:13), Max: 99.1 (23 Oct 2020 18:45)  HR: 58 (24 Oct 2020 13:13) (50 - 70)  BP: 110/69 (24 Oct 2020 13:13) (82/49 - 224/108)  RR: 16 (24 Oct 2020 13:13) (16 - 20)  SpO2: 95% (24 Oct 2020 13:13) (95% - 100%)    Upon arrival of rapid response team patient lethargic but arouses to touch+voice. Over the next several minutes, patient became more alert and patient A+Ox3.    PHYSICAL EXAM:  GENERAL: Pt lying comfortably, NAD.  ENMT: PERRL, +EOMI.  NECK: soft, Supple, No JVD,   CHEST/LUNG: Clear to auscultation bilaterally  HEART: S1S2+, Regular rate and rhythm  ABDOMEN: Soft, Nontender, Nondistended; Bowel sounds present.  SKIN: warm, dry  NEURO: AAOX3, no focal deficits

## 2020-10-24 NOTE — PROGRESS NOTE ADULT - SUBJECTIVE AND OBJECTIVE BOX
[facility]                                                               Office: [address] [address]                                                              Telephone: [phone]. Fax:[phone]                                                                             PROGRESS NOTE  Reason for follow up: Chest Pain/Slurred Speech  Overnight: No new events.   Update: Patient still awaiting MRI/MRA. No further episodes of chest pain or slurred speech. Patient's BP still very elevated, but no headache or vision changes at this time.       Review of symptoms:   Cardiac:  No chest pain. No dyspnea. No palpitations.  Respiratory: No cough. No dyspnea  Gastrointestinal: No diarrhea. No abdominal pain. No bleeding.     Past medical history: No updates.   	  Vitals:  T(C): 36.8 (10-24-20 @ 07:23), Max: 37.3 (10-23-20 @ 18:45)  HR: 70 (10-24-20 @ 07:23) (64 - 71)  BP: 177/92 (10-24-20 @ 07:23) (154/80 - 224/108)  RR: 18 (10-24-20 @ 07:23) (18 - 20)  SpO2: 96% (10-24-20 @ 07:23) (96% - 100%)  Wt(kg): --  I&O's Summary      PHYSICAL EXAM:  Constitutional: Comfortable . No acute distress.   HEENT: Atraumatic and normocephalic , neck is supple . no JVD. +left carotid bruit. PEERL   CNS: A&Ox3. No focal deficits. EOMI.   Lymph Nodes: Cervical : Not palpable.  Respiratory: CTAB   Cardiovascular: S1S2 RRR. + sys. murmur radiating to carotid, 2nd intercostal space   Gastrointestinal: Soft non-tender and non distended . +Bowel sounds. negative Suresh's sign.  Extremities: No edema.   Psychiatric: Calm . no agitation.  Skin: No skin rash/ulcers visualized to face, hands or feet.      CURRENT MEDICATIONS:  carvedilol 12.5 milliGRAM(s) Oral every 12 hours  hydrALAZINE 50 milliGRAM(s) Oral every 8 hours  losartan 100 milliGRAM(s) Oral daily    gabapentin  atorvastatin  dextrose 50% Injectable  dextrose 50% Injectable  dextrose 50% Injectable  insulin glargine Injectable (LANTUS)  insulin lispro (ADMELOG) corrective regimen sliding scale  insulin lispro (ADMELOG) corrective regimen sliding scale  insulin lispro Injectable (ADMELOG)  levothyroxine  aspirin  chewable  dextrose 5%.      DIAGNOSTIC TESTING:  [ ] Echocardiogram:   As per outpt echo 2020- LVEF 61%, mild concentric hypertrophy     [ ]  Catheterization:    < from: Cardiac Cath Lab - Adult (11.06.18 @ 16:04) >  VENTRICLES: No LV gram was performed; however, a recent echocardiogram  demonstrated an EF of 55 %.  CORONARY VESSELS: The coronary circulation is right dominant.  LM:   --  Distal left main: There was a 50 % stenosis.  LAD:   --Ostial LAD: There was a 80 % stenosis.  --  Proximal LAD: There was a 50 % stenosis in-stent.  --  Mid LAD: There was a 70 % stenosis in-stent.  CX:   --  Proximal circumflex: There was a 70 % stenosis.  RCA:   --  Proximal RCA: There was a 80 % stenosis.  --  Distal RCA: There was a 50 % stenosis.  GRAFTS:   --  Graft to the distal LAD: The graft was a sequential LIMA.  Graft angiography showed no evidence of disease.  --  Graft to the 1st diagonal: The graft was a LIMA. Graft angiography  showed no evidence of disease.  --  Graft to the 2nd diagonal: The graft was a saphenous vein graft. Graft  angiography showed no evidence of disease.  --  Graft to the 1st obtuse marginal: The graft was a saphenous vein graft.  Graft angiography showed noevidence of disease.  --  Graft to the RPDA: The graft was a sequential vein graft. Graft  angiography showed no evidence of disease.  COMPLICATIONS: No complications occurred during the cath lab visit.  DIAGNOSTIC IMPRESSIONS: Patent Grafts.  No newdisease.  DIAGNOSTIC RECOMMENDATIONS: Assess for non cardiac causes of chest pain.  Patient has hiatal hernia which could be the etiology. Increase PPI to bid.  Prepared and signed by  Riccardo Erazo MD  Signed 11/06/2018 17:53:43    < end of copied text >    [ ] Stress Test:    < from: Nuclear Stress Test-Pharmacologic (10.23.20 @ 09:34) >  NUCLEAR FINDINGS:  Review of raw data shows: Breast attenuation artifact.,  Diaphragmatic artifact.  The left ventricle was normal LV size. There are small,  mild defects in mid anterior, mid anteroseptalwalls that  are predominantly reversible, suggestive of ischemia.There  are medium sized, moderate defects in basal and mid  inferior, basal and mid infero-lateral walls that are  minimally reversible suggestive of infarction with  mild  cathy-infarctischemi  ------------------------------------------------------------------------      GATED ANALYSIS:   Hypokinesis of basal inferior and inferolateral wall.  Poststress LVEF  69%.  ------------------------------------------------------------------------    IMPRESSIONS:Abnormal Study  * Unable to walk due to covid pandemic precautions  * Chest Pain: No chest pain with administration of  Regadenoson.  * Symptom: No Symptom.  * HR Response: Appropriate.  * BP Response: Appropriate.  * Heart Rhythm: Normal Sinus Rhythm.  * ECG Abnormalities: There were no diagnostic changes.  * Arrhythmia: 1 VPDs occurred.  * As per Stress  lab NP notes: "was Called by nuclear tech  to evaluate patient for elevated heart rate during last  minute of stress pictures. Patients speech was delayed,  NST on monitor. Aminophylline 75mg iv x 1 given, patient  hypertensive hydralzine 10mg IV x1 given. Rapid response  called and patient brought to ED."  * Review of raw data shows: Breast attenuation artifact.,  Diaphragmatic artifact.  * The left ventricle was normal LV size. There are small,  mild defects in mid anterior, mid anteroseptal  walls that  are predominantly reversible, suggestive of ischemia.There  are medium sized, moderate defects in basal and mid  inferior, basal and mid infero-lateral walls that are  minimally reversible suggestive of infarction with  mild  cathy-infarct ischemi  *  Hypokinesis of basal inferior and inferolateral wall.  Poststress LVEF  69%.    ------------------------------------------------------------------------      ------------------------------------------------------------------------    Confirmed on  10/23/2020 - 12:04:23 at [facility] by  Devin Limon MD   Cardiology Fellow: Deedee Kumar    < end of copied text >    OTHER: 	  < from: CT Head No Cont (10.23.20 @ 13:50) >     EXAM:  CT BRAIN                          PROCEDURE DATE:  10/23/2020          INTERPRETATION:  CLINICAL INFORMATION: ams. . .    TECHNIQUE: Sequential axial images were obtained from the vertex to the skull base without intravenous contrast. Coronal and sagittal reformations were obtained.    COMPARISON: CT head 11/6/2018.  MRI brain 11/8/2018.    FINDINGS:    Encephalomalacia and gliosis in the left corona radiata and basal ganglia. There is no acute intracranial hemorrhage or mass effect. There are areas of hypodensity in the bilateral hemispheric white matter suggesting white matter microvascular ischemic change. There is cerebral volume loss.    There is no extraaxial fluid collection.    There is no displaced calvarial fracture. Status post bilateral intraocular lens implants. The visualized portions of the paranasal sinuses are well aerated. The mastoid air cells are well aerated.      IMPRESSION: No acute intracranial hemorrhage or mass effect.    < end of copied text >      LABS:	 	  CARDIAC MARKERS ( 23 Oct 2020 11:58 )  x     / 0.01 ng/mL / x     / x     / x      p-BNP 23 Oct 2020 11:58: 1313 pg/mL                          11.5   6.94  )-----------( 121      ( 23 Oct 2020 11:58 )             36.6     10-23    136  |  101  |  21.0<H>  ----------------------------<  451<H>  4.5   |  23.0  |  0.81    Ca    9.3      23 Oct 2020 11:58  Mg     2.0     10-23    TPro  7.2  /  Alb  3.8  /  TBili  0.5  /  DBili  x   /  AST  21  /  ALT  17  /  AlkPhos  147<H>  10-23    proBNP: Serum Pro-Brain Natriuretic Peptide: 1313 pg/mL (10-23 @ 11:58)    Lipid Profile:   HgA1c:   TSH:       TELEMETRY: Reviewed  SR         [facility] [facility]                                                               Office: [address] [address]                                                              Telephone: [phone]. Fax:[phone]                                                                             PROGRESS NOTE  Reason for follow up: Chest Pain/Slurred Speech  Overnight: No new events.   Update: Patient still awaiting MRI/MRA. No further episodes of chest pain or slurred speech. Patient's BP still very elevated, but no headache or vision changes at this time.       Review of symptoms:   Cardiac:  No chest pain. No dyspnea. No palpitations.  Respiratory: No cough. No dyspnea  Gastrointestinal: No diarrhea. No abdominal pain. No bleeding.     Past medical history: No updates.   	  Vitals:  T(C): 36.8 (10-24-20 @ 07:23), Max: 37.3 (10-23-20 @ 18:45)  HR: 70 (10-24-20 @ 07:23) (64 - 71)  BP: 177/92 (10-24-20 @ 07:23) (154/80 - 224/108)  RR: 18 (10-24-20 @ 07:23) (18 - 20)  SpO2: 96% (10-24-20 @ 07:23) (96% - 100%)  Wt(kg): --  I&O's Summary      PHYSICAL EXAM:  Constitutional: Comfortable . No acute distress.   HEENT: Atraumatic and normocephalic , neck is supple . no JVD. +left carotid bruit. PEERL   CNS: A&Ox3. No focal deficits. EOMI.   Lymph Nodes: Cervical : Not palpable.  Respiratory: CTAB   Cardiovascular: S1S2 RRR. + sys. murmur radiating to carotid, 2nd intercostal space   Gastrointestinal: Soft non-tender and non distended . +Bowel sounds. negative Suresh's sign.  Extremities: No edema.   Psychiatric: Calm . no agitation.  Skin: No skin rash/ulcers visualized to face, hands or feet.      CURRENT MEDICATIONS:  carvedilol 12.5 milliGRAM(s) Oral every 12 hours  hydrALAZINE 50 milliGRAM(s) Oral every 8 hours  losartan 100 milliGRAM(s) Oral daily    gabapentin  atorvastatin  dextrose 50% Injectable  dextrose 50% Injectable  dextrose 50% Injectable  insulin glargine Injectable (LANTUS)  insulin lispro (ADMELOG) corrective regimen sliding scale  insulin lispro (ADMELOG) corrective regimen sliding scale  insulin lispro Injectable (ADMELOG)  levothyroxine  aspirin  chewable  dextrose 5%.      DIAGNOSTIC TESTING:  [ ] Echocardiogram:   As per outpt echo 2020- LVEF 61%, mild concentric hypertrophy     [ ]  Catheterization:    < from: Cardiac Cath Lab - Adult (11.06.18 @ 16:04) >  VENTRICLES: No LV gram was performed; however, a recent echocardiogram  demonstrated an EF of 55 %.  CORONARY VESSELS: The coronary circulation is right dominant.  LM:   --  Distal left main: There was a 50 % stenosis.  LAD:   --Ostial LAD: There was a 80 % stenosis.  --  Proximal LAD: There was a 50 % stenosis in-stent.  --  Mid LAD: There was a 70 % stenosis in-stent.  CX:   --  Proximal circumflex: There was a 70 % stenosis.  RCA:   --  Proximal RCA: There was a 80 % stenosis.  --  Distal RCA: There was a 50 % stenosis.  GRAFTS:   --  Graft to the distal LAD: The graft was a sequential LIMA.  Graft angiography showed no evidence of disease.  --  Graft to the 1st diagonal: The graft was a LIMA. Graft angiography  showed no evidence of disease.  --  Graft to the 2nd diagonal: The graft was a saphenous vein graft. Graft  angiography showed no evidence of disease.  --  Graft to the 1st obtuse marginal: The graft was a saphenous vein graft.  Graft angiography showed noevidence of disease.  --  Graft to the RPDA: The graft was a sequential vein graft. Graft  angiography showed no evidence of disease.  COMPLICATIONS: No complications occurred during the cath lab visit.  DIAGNOSTIC IMPRESSIONS: Patent Grafts.  No newdisease.  DIAGNOSTIC RECOMMENDATIONS: Assess for non cardiac causes of chest pain.  Patient has hiatal hernia which could be the etiology. Increase PPI to bid.  Prepared and signed by  Riccardo Erazo MD  Signed 11/06/2018 17:53:43    < end of copied text >    [ ] Stress Test:    < from: Nuclear Stress Test-Pharmacologic (10.23.20 @ 09:34) >  NUCLEAR FINDINGS:  Review of raw data shows: Breast attenuation artifact.,  Diaphragmatic artifact.  The left ventricle was normal LV size. There are small,  mild defects in mid anterior, mid anteroseptalwalls that  are predominantly reversible, suggestive of ischemia.There  are medium sized, moderate defects in basal and mid  inferior, basal and mid infero-lateral walls that are  minimally reversible suggestive of infarction with  mild  cathy-infarctischemi  ------------------------------------------------------------------------      GATED ANALYSIS:   Hypokinesis of basal inferior and inferolateral wall.  Poststress LVEF  69%.  ------------------------------------------------------------------------    IMPRESSIONS:Abnormal Study  * Unable to walk due to covid pandemic precautions  * Chest Pain: No chest pain with administration of  Regadenoson.  * Symptom: No Symptom.  * HR Response: Appropriate.  * BP Response: Appropriate.  * Heart Rhythm: Normal Sinus Rhythm.  * ECG Abnormalities: There were no diagnostic changes.  * Arrhythmia: 1 VPDs occurred.  * As per Stress  lab NP notes: "was Called by nuclear tech  to evaluate patient for elevated heart rate during last  minute of stress pictures. Patients speech was delayed,  NST on monitor. Aminophylline 75mg iv x 1 given, patient  hypertensive hydralzine 10mg IV x1 given. Rapid response  called and patient brought to ED."  * Review of raw data shows: Breast attenuation artifact.,  Diaphragmatic artifact.  * The left ventricle was normal LV size. There are small,  mild defects in mid anterior, mid anteroseptal  walls that  are predominantly reversible, suggestive of ischemia.There  are medium sized, moderate defects in basal and mid  inferior, basal and mid infero-lateral walls that are  minimally reversible suggestive of infarction with  mild  cathy-infarct ischemi  *  Hypokinesis of basal inferior and inferolateral wall.  Poststress LVEF  69%.    ------------------------------------------------------------------------      ------------------------------------------------------------------------    Confirmed on  10/23/2020 - 12:04:23 at [facility] by  Devin Limon MD   Cardiology Fellow: Deedee Kumar    < end of copied text >    OTHER: 	  < from: CT Head No Cont (10.23.20 @ 13:50) >     EXAM:  CT BRAIN                          PROCEDURE DATE:  10/23/2020          INTERPRETATION:  CLINICAL INFORMATION: ams. . .    TECHNIQUE: Sequential axial images were obtained from the vertex to the skull base without intravenous contrast. Coronal and sagittal reformations were obtained.    COMPARISON: CT head 11/6/2018.  MRI brain 11/8/2018.    FINDINGS:    Encephalomalacia and gliosis in the left corona radiata and basal ganglia. There is no acute intracranial hemorrhage or mass effect. There are areas of hypodensity in the bilateral hemispheric white matter suggesting white matter microvascular ischemic change. There is cerebral volume loss.    There is no extraaxial fluid collection.    There is no displaced calvarial fracture. Status post bilateral intraocular lens implants. The visualized portions of the paranasal sinuses are well aerated. The mastoid air cells are well aerated.      IMPRESSION: No acute intracranial hemorrhage or mass effect.    < end of copied text >      LABS:	 	  CARDIAC MARKERS ( 23 Oct 2020 11:58 )  x     / 0.01 ng/mL / x     / x     / x      p-BNP 23 Oct 2020 11:58: 1313 pg/mL                          11.5   6.94  )-----------( 121      ( 23 Oct 2020 11:58 )             36.6     10-23    136  |  101  |  21.0<H>  ----------------------------<  451<H>  4.5   |  23.0  |  0.81    Ca    9.3      23 Oct 2020 11:58  Mg     2.0     10-23    TPro  7.2  /  Alb  3.8  /  TBili  0.5  /  DBili  x   /  AST  21  /  ALT  17  /  AlkPhos  147<H>  10-23    proBNP: Serum Pro-Brain Natriuretic Peptide: 1313 pg/mL (10-23 @ 11:58)    Lipid Profile:   HgA1c:   TSH:       TELEMETRY: Reviewed  SR

## 2020-10-24 NOTE — PROGRESS NOTE ADULT - PROBLEM SELECTOR PLAN 2
continue with asa 81 mg QD. Avoid rapid drip in BP. Control HTN. awaiting MRI and MRA of head and neck to assess for stroke or stenosis. DVT prophylaxis is recommended. IF positive for acute CVA put on stroke protocols.

## 2020-10-24 NOTE — RAPID RESPONSE TEAM SUMMARY - NSOTHERINTERVENTIONSRRT_GEN_ALL_CORE
Neurology present, patient pending MRA - no other recommendations at this time  Cardiology contacted - feel pt likely had vasovagal episode bearing down for BM. Coreg was to be added today but will hold for now.

## 2020-10-25 DIAGNOSIS — G45.9 TRANSIENT CEREBRAL ISCHEMIC ATTACK, UNSPECIFIED: ICD-10-CM

## 2020-10-25 LAB
ANION GAP SERPL CALC-SCNC: 10 MMOL/L — SIGNIFICANT CHANGE UP (ref 5–17)
BUN SERPL-MCNC: 21 MG/DL — HIGH (ref 8–20)
CALCIUM SERPL-MCNC: 8.5 MG/DL — LOW (ref 8.6–10.2)
CHLORIDE SERPL-SCNC: 106 MMOL/L — SIGNIFICANT CHANGE UP (ref 98–107)
CO2 SERPL-SCNC: 24 MMOL/L — SIGNIFICANT CHANGE UP (ref 22–29)
CREAT SERPL-MCNC: 0.94 MG/DL — SIGNIFICANT CHANGE UP (ref 0.5–1.3)
GLUCOSE BLDC GLUCOMTR-MCNC: 166 MG/DL — HIGH (ref 70–99)
GLUCOSE BLDC GLUCOMTR-MCNC: 174 MG/DL — HIGH (ref 70–99)
GLUCOSE BLDC GLUCOMTR-MCNC: 252 MG/DL — HIGH (ref 70–99)
GLUCOSE BLDC GLUCOMTR-MCNC: 281 MG/DL — HIGH (ref 70–99)
GLUCOSE SERPL-MCNC: 193 MG/DL — HIGH (ref 70–99)
HCT VFR BLD CALC: 34.9 % — SIGNIFICANT CHANGE UP (ref 34.5–45)
HCV AB S/CO SERPL IA: 0.1 S/CO — SIGNIFICANT CHANGE UP (ref 0–0.99)
HCV AB SERPL-IMP: SIGNIFICANT CHANGE UP
HGB BLD-MCNC: 10.8 G/DL — LOW (ref 11.5–15.5)
MCHC RBC-ENTMCNC: 27.8 PG — SIGNIFICANT CHANGE UP (ref 27–34)
MCHC RBC-ENTMCNC: 30.9 GM/DL — LOW (ref 32–36)
MCV RBC AUTO: 89.7 FL — SIGNIFICANT CHANGE UP (ref 80–100)
PLATELET # BLD AUTO: 126 K/UL — LOW (ref 150–400)
POTASSIUM SERPL-MCNC: 3.8 MMOL/L — SIGNIFICANT CHANGE UP (ref 3.5–5.3)
POTASSIUM SERPL-SCNC: 3.8 MMOL/L — SIGNIFICANT CHANGE UP (ref 3.5–5.3)
RBC # BLD: 3.89 M/UL — SIGNIFICANT CHANGE UP (ref 3.8–5.2)
RBC # FLD: 14.7 % — HIGH (ref 10.3–14.5)
SARS-COV-2 IGG SERPL QL IA: NEGATIVE — SIGNIFICANT CHANGE UP
SARS-COV-2 IGM SERPL IA-ACNC: <0.1 INDEX — SIGNIFICANT CHANGE UP
SODIUM SERPL-SCNC: 140 MMOL/L — SIGNIFICANT CHANGE UP (ref 135–145)
WBC # BLD: 7.31 K/UL — SIGNIFICANT CHANGE UP (ref 3.8–10.5)
WBC # FLD AUTO: 7.31 K/UL — SIGNIFICANT CHANGE UP (ref 3.8–10.5)

## 2020-10-25 PROCEDURE — 99232 SBSQ HOSP IP/OBS MODERATE 35: CPT

## 2020-10-25 RX ORDER — AMLODIPINE BESYLATE 2.5 MG/1
5 TABLET ORAL DAILY
Refills: 0 | Status: DISCONTINUED | OUTPATIENT
Start: 2020-10-25 | End: 2020-10-26

## 2020-10-25 RX ORDER — ACETAMINOPHEN 500 MG
650 TABLET ORAL ONCE
Refills: 0 | Status: COMPLETED | OUTPATIENT
Start: 2020-10-25 | End: 2020-10-25

## 2020-10-25 RX ADMIN — Medication 650 MILLIGRAM(S): at 20:05

## 2020-10-25 RX ADMIN — SENNA PLUS 1 TABLET(S): 8.6 TABLET ORAL at 21:13

## 2020-10-25 RX ADMIN — INSULIN GLARGINE 15 UNIT(S): 100 INJECTION, SOLUTION SUBCUTANEOUS at 21:13

## 2020-10-25 RX ADMIN — Medication 6: at 16:06

## 2020-10-25 RX ADMIN — Medication 50 MILLIGRAM(S): at 05:32

## 2020-10-25 RX ADMIN — Medication 50 MILLIGRAM(S): at 21:13

## 2020-10-25 RX ADMIN — Medication 81 MILLIGRAM(S): at 11:00

## 2020-10-25 RX ADMIN — LOSARTAN POTASSIUM 100 MILLIGRAM(S): 100 TABLET, FILM COATED ORAL at 05:32

## 2020-10-25 RX ADMIN — Medication 2: at 07:47

## 2020-10-25 RX ADMIN — Medication 8 UNIT(S): at 16:06

## 2020-10-25 RX ADMIN — Medication 8 UNIT(S): at 07:46

## 2020-10-25 RX ADMIN — GABAPENTIN 100 MILLIGRAM(S): 400 CAPSULE ORAL at 16:04

## 2020-10-25 RX ADMIN — Medication 137 MICROGRAM(S): at 05:32

## 2020-10-25 RX ADMIN — Medication 6: at 11:00

## 2020-10-25 RX ADMIN — AMLODIPINE BESYLATE 5 MILLIGRAM(S): 2.5 TABLET ORAL at 11:00

## 2020-10-25 RX ADMIN — Medication 8 UNIT(S): at 11:00

## 2020-10-25 RX ADMIN — GABAPENTIN 100 MILLIGRAM(S): 400 CAPSULE ORAL at 05:32

## 2020-10-25 RX ADMIN — ISOSORBIDE MONONITRATE 30 MILLIGRAM(S): 60 TABLET, EXTENDED RELEASE ORAL at 07:53

## 2020-10-25 RX ADMIN — Medication 50 MILLIGRAM(S): at 16:04

## 2020-10-25 RX ADMIN — ATORVASTATIN CALCIUM 40 MILLIGRAM(S): 80 TABLET, FILM COATED ORAL at 21:12

## 2020-10-25 RX ADMIN — Medication 650 MILLIGRAM(S): at 05:38

## 2020-10-25 NOTE — PROGRESS NOTE ADULT - SUBJECTIVE AND OBJECTIVE BOX
SERGIO PHILLIP    [id number]    70y      Female    INTERVAL HPI/OVERNIGHT EVENTS: all symptoms of weakness/numbness resolved. had problems with both high and low BP, low glucose. now no further neuro symptoms    Vital Signs Last 24 Hrs  T(C): 36.9 (25 Oct 2020 07:37), Max: 36.9 (25 Oct 2020 05:17)  T(F): 98.5 (25 Oct 2020 07:37), Max: 98.5 (25 Oct 2020 05:17)  HR: 74 (25 Oct 2020 09:20) (50 - 74)  BP: 127/69 (25 Oct 2020 09:20) (82/49 - 183/82)  BP(mean): 107 (25 Oct 2020 05:17) (107 - 107)  RR: 18 (25 Oct 2020 07:37) (16 - 18)  SpO2: 97% (25 Oct 2020 07:37) (95% - 98%)    PHYSICAL EXAM:    GENERAL: no acute distress. dressed appropriate for hospitalization.  NEURO: awake, alert and interactive. speech fluent. no dysarthria. pupils equal. EOMI. Visual fields full to movement. facial strength normal, symmetric.  no drift. no finger to nose ataxia. Motor strength 5/5 diffusely.  plantar response flexor bilaterally. motor tone normal. sensation intact to light touch-   PSYCH: mood appropriate, cooperative.       LABS:                        10.8   7.31  )-----------( 126      ( 25 Oct 2020 07:04 )             34.9     10-25    140  |  106  |  21.0<H>  ----------------------------<  193<H>  3.8   |  24.0  |  0.94    Ca    8.5<L>      25 Oct 2020 07:04  Mg     2.0     10-23    TPro  7.2  /  Alb  3.8  /  TBili  0.5  /  DBili  x   /  AST  21  /  ALT  17  /  AlkPhos  147<H>  10-23    PT/INR - ( 23 Oct 2020 11:58 )   PT: 11.8 sec;   INR: 1.02 ratio         PTT - ( 23 Oct 2020 11:58 )  PTT:27.1 sec      MEDICATIONS  (STANDING):  amLODIPine   Tablet 5 milliGRAM(s) Oral daily  aspirin  chewable 81 milliGRAM(s) Oral daily  atorvastatin 40 milliGRAM(s) Oral at bedtime  dextrose 5%. 1000 milliLiter(s) (50 mL/Hr) IV Continuous <Continuous>  dextrose 50% Injectable 12.5 Gram(s) IV Push once  dextrose 50% Injectable 25 Gram(s) IV Push once  dextrose 50% Injectable 25 Gram(s) IV Push once  gabapentin 100 milliGRAM(s) Oral two times a day  hydrALAZINE 50 milliGRAM(s) Oral every 8 hours  insulin glargine Injectable (LANTUS) 15 Unit(s) SubCutaneous at bedtime  insulin lispro (ADMELOG) corrective regimen sliding scale   SubCutaneous three times a day before meals  insulin lispro (ADMELOG) corrective regimen sliding scale   SubCutaneous at bedtime  insulin lispro Injectable (ADMELOG) 8 Unit(s) SubCutaneous three times a day before meals  isosorbide   mononitrate ER Tablet (IMDUR) 30 milliGRAM(s) Oral daily  levothyroxine 137 MICROGram(s) Oral daily  losartan 100 milliGRAM(s) Oral daily  senna 1 Tablet(s) Oral at bedtime    MEDICATIONS  (PRN):  dextrose 40% Gel 15 Gram(s) Oral once PRN Blood Glucose LESS THAN 70 milliGRAM(s)/deciliter  glucagon  Injectable 1 milliGRAM(s) IntraMuscular once PRN Glucose LESS THAN 70 milligrams/deciliter  ondansetron Injectable 4 milliGRAM(s) IV Push every 6 hours PRN Nausea and/or Vomiting      RADIOLOGY & ADDITIONAL TESTS:

## 2020-10-25 NOTE — CHART NOTE - NSCHARTNOTEFT_GEN_A_CORE
Patient complained of headache. Patient seen and examined at bedside. Patient otherwise asymptomatic. /73 HR 69, T98.5. RR16, sPO2 95% RA.   Tylenol 650mg PO x1 dose given. scheduled Antihypertensive also given. Will continue to monitor.

## 2020-10-25 NOTE — PROGRESS NOTE ADULT - ASSESSMENT
TIA related to hypertension and near syncope/blackout related to low glucose. CT lacunar infarction - likely old. no focal or new neurological symptoms.

## 2020-10-25 NOTE — PROGRESS NOTE ADULT - ASSESSMENT
A/P: Pt 71 y/o female with medical history DM Type I, CAD s/p PCI CABGX4, Stage III CKD, CVA, Anemia, hypothyroidism, who presents to [facility]-ED for sudden onset slurred speech and chest pain during nuclear stress test. Pt states that she has been having worsening dyspnea for past 1-2 years. NST was scheduled today in [facility] for ischemic eval. Towards end of procedure, pt started having midsternal chest pain described as tightness radiating to left side of chest, 8/10, lasting seconds in duration. Pt also had associated palpitations, SOB, slurred speech. Pt BP was checked 200/110 (Wwith no meds on board). Test was terminated RRT called, pt was brought to ED. In ED, CT Head- no acute intracranial hemorrhage or mass effect, Trop#1-negative. NST resulted and shows abnormal study, small mid defects in mid anterior, mid septal walls, reversible suggestive of ischemia hypokinesis basal inferior and inferolateral wall LVEF 69%. Upon assessment pt denies neuro deficits, or chest pain.        Chest Pain  -Trop#1-negative  -ECG- NSR HR @ 70, q wave V1  -NST-abnormal study, small mid defects in mid anterior, mid septal walls, reversible suggestive of ischemia hypokinesis basal inferior and inferolateral wall LVEF 69%.  - Echo with moderate to severe AS.   - Neuro following, if cleared will have LHC on Monday.  No indication at this time for urgent LHC  - NPO after midnight.      Dysarthria  -CT Head- no acute intracranial hemorrhage or mass effect  -Neuro following, awaiting clearance for LHC.  - MRI/MRA pending.     Hypertensive Urgency  - Patient still with elevated BP, but episode of hypotension yesterday after a vasovagal episode.   - Still with some SB.   - D/C Coreg.   - Continue hydralazine and Imdur.   - Added norvasc 5mg PO qday.   - Further adjustments as needed.       Moderate to Severe AS   - Noted on repeat echo.   - Patient with near syncope while trying to have a BM yesterday, likely vasovagal.   - Will need to evaluate valve on [facility] tomorrow.     Preliminary evaluation, please await official recommendations by Dr. Mata A/P: Pt 69 y/o female with medical history DM Type I, CAD s/p PCI CABGX4, Stage III CKD, CVA, Anemia, hypothyroidism, who presents to [facility]-ED for sudden onset slurred speech and chest pain during nuclear stress test. Pt states that she has been having worsening dyspnea for past 1-2 years. NST was scheduled today in [facility] for ischemic eval. Towards end of procedure, pt started having midsternal chest pain described as tightness radiating to left side of chest, 8/10, lasting seconds in duration. Pt also had associated palpitations, SOB, slurred speech. Pt BP was checked 200/110 (Wwith no meds on board). Test was terminated RRT called, pt was brought to ED. In ED, CT Head- no acute intracranial hemorrhage or mass effect, Trop#1-negative. NST resulted and shows abnormal study, small mid defects in mid anterior, mid septal walls, reversible suggestive of ischemia hypokinesis basal inferior and inferolateral wall LVEF 69%. Upon assessment pt denies neuro deficits, or chest pain.        Chest Pain  -Trop#1-negative  -ECG- NSR HR @ 70, q wave V1  -NST-abnormal study, small mid defects in mid anterior, mid septal walls, reversible suggestive of ischemia hypokinesis basal inferior and inferolateral wall LVEF 69%.  - Echo with moderate to severe AS.   - Neuro following, if cleared will have LHC on Monday.  No indication at this time for urgent LHC  - NPO after midnight.    - continue ASA and statin    Dysarthria  -CT Head- no acute intracranial hemorrhage or mass effect  -Neuro following, awaiting clearance for LHC.  - MRI/MRA pending.     Hypertensive Urgency  - Patient still with elevated BP, but episode of hypotension yesterday after a vasovagal episode.   - Still with some SB.   - D/C Coreg.   - Continue hydralazine and Imdur.   - Added norvasc 5mg PO qday.   - Further adjustments as needed.       Moderate to Severe AS   - Noted on repeat echo; ALISSA in severe range but with velocities and gradients more consistent with moderate disease  - Patient with near syncope while trying to have a BM yesterday, likely vasovagal.   - Avoid preload reduction and start bowel regimen to avoid additional valsalva episodes  - Will need to evaluate valve on C tomorrow.

## 2020-10-25 NOTE — PROGRESS NOTE ADULT - SUBJECTIVE AND OBJECTIVE BOX
[place] [facility]                                                               Office: [address] [address]                                                              Telephone: [phone]. Fax:[phone]                                                                             PROGRESS NOTE  Reason for follow up: Chest Pain/Slurred Speech  Overnight: No new events.   Update: Patient still awaiting MRI/MRA. No further episodes of chest pain or slurred speech. Patient's BP still elevated today after yesterdays event with SB and hypotension. Patient with a mild headache at this time as well.       Review of symptoms:   Cardiac:  No chest pain. No dyspnea. No palpitations.  Respiratory: No cough. No dyspnea  Gastrointestinal: No diarrhea. No abdominal pain. No bleeding.     Past medical history: No updates.   	  Vitals:  T(C): 36.9 (10-25-20 @ 07:37), Max: 36.9 (10-25-20 @ 05:17)  HR: 74 (10-25-20 @ 09:20) (50 - 74)  BP: 127/69 (10-25-20 @ 09:20) (82/49 - 183/82)  RR: 18 (10-25-20 @ 07:37) (16 - 18)  SpO2: 97% (10-25-20 @ 07:37) (95% - 98%)  Wt(kg): --  I&O's Summary      PHYSICAL EXAM:  Constitutional: Comfortable . No acute distress.   HEENT: Atraumatic and normocephalic , neck is supple . no JVD. +left carotid bruit. PEERL   CNS: A&Ox3. No focal deficits. EOMI.   Lymph Nodes: Cervical : Not palpable.  Respiratory: CTAB   Cardiovascular: S1S2 RRR. + sys. murmur radiating to carotid, 2nd intercostal space   Gastrointestinal: Soft non-tender and non distended . +Bowel sounds. negative Suresh's sign.  Extremities: No edema.   Psychiatric: Calm . no agitation.  Skin: No skin rash/ulcers visualized to face, hands or feet.        CURRENT MEDICATIONS:  amLODIPine   Tablet 5 milliGRAM(s) Oral daily  hydrALAZINE 50 milliGRAM(s) Oral every 8 hours  isosorbide   mononitrate ER Tablet (IMDUR) 30 milliGRAM(s) Oral daily  losartan 100 milliGRAM(s) Oral daily    gabapentin  senna  atorvastatin  dextrose 50% Injectable  dextrose 50% Injectable  dextrose 50% Injectable  insulin glargine Injectable (LANTUS)  insulin lispro (ADMELOG) corrective regimen sliding scale  insulin lispro (ADMELOG) corrective regimen sliding scale  insulin lispro Injectable (ADMELOG)  levothyroxine  aspirin  chewable  dextrose 5%.      DIAGNOSTIC TESTING:  [ ] Echocardiogram:   < from: TTE Echo Complete w/o Contrast w/ Doppler (10.23.20 @ 15:38) >  PHYSICIAN INTERPRETATION:  LeftVentricle: The left ventricular internal cavity size is normal.  Global LV systolic function was normal. Left ventricular ejection fraction, by visual estimation, is 60 to 65%. Spectral Doppler shows pseudonormal pattern of left ventricular myocardial filling (Grade II diastolic dysfunction). Elevated mean left atrial pressure.  Right Ventricle: Normal right ventricular size and function.  Left Atrium: Moderately enlarged left atrium.  Right Atrium: Normal right atrial size.  Pericardium: There is no evidence of pericardial effusion.  Mitral Valve: Structurally normal mitral valve, with normal leaflet excursion. Mild thickening and calcification of the anterior and posterior mitral valve leaflets. There is moderate mitral annular calcification. Trace mitral valve regurgitation is seen.  Tricuspid Valve: Structurally normal tricuspid valve, with normal leaflet excursion. Trivial tricuspid regurgitation is visualized. Adequate TR velocity was not obtained to accurately assess RVSP.  Aortic Valve: The aortic valve is trileaflet. Moderate to severe aortic stenosis is present. Trivial aortic valve regurgitation is seen. The Dimesionless Index value is 0.29.  Pulmonic Valve: Mild pulmonic valve regurgitation.  Aorta: There is dilatation of the aortic root.  Venous: The inferior vena cava is normal. The inferior vena cava was normal sized, with respiratory size variation greater than 50%.      Summary:   1. Left ventricular ejection fraction, by visual estimation, is 60 to 65%.   2. Normal global left ventricular systolic function.   3. Spectral Doppler shows pseudonormal pattern of left ventricular myocardial filling (Grade II diastolic dysfunction). Elevated mean left atrial pressure.   4. Normal left ventricular internal cavity size.   5. There is mild concentric left ventricular hypertrophy.   6. Normal right ventricular size and function.   7. Moderately enlarged left atrium.   8. Normal right atrial size.   9. Moderate mitral annular calcification.  10. Mild thickening and calcification of the anterior and posterior mitral valve leaflets.  11. Trace mitral valve regurgitation.  12. Moderate to severe aortic valve stenosis. The Dimesionless Index value is 0.29.  13. Mild pulmonic valve regurgitation.  14. Dilatation of the aortic root.  15. There is no evidence of pericardial effusion.    MD Shellie Electronically signed on 10/24/2020 at 11:07:01 AM    < end of copied text >    [ ] Stress Test:    < from: Nuclear Stress Test-Pharmacologic (10.23.20 @ 09:34) >  NUCLEAR FINDINGS:  Review of raw data shows: Breast attenuation artifact.,  Diaphragmatic artifact.  The left ventricle was normal LV size. There are small,  mild defects in mid anterior, mid anteroseptalwalls that  are predominantly reversible, suggestive of ischemia.There  are medium sized, moderate defects in basal and mid  inferior, basal and mid infero-lateral walls that are  minimally reversible suggestive of infarction with  mild  cathy-infarctischemi  ------------------------------------------------------------------------      GATED ANALYSIS:   Hypokinesis of basal inferior and inferolateral wall.  Poststress LVEF  69%.  ------------------------------------------------------------------------    IMPRESSIONS:Abnormal Study  * Unable to walk due to covid pandemic precautions  * Chest Pain: No chest pain with administration of  Regadenoson.  * Symptom: No Symptom.  * HR Response: Appropriate.  * BP Response: Appropriate.  * Heart Rhythm: Normal Sinus Rhythm.  * ECG Abnormalities: There were no diagnostic changes.  * Arrhythmia: 1 VPDs occurred.  * As per Stress  lab NP notes: "was Called by nuclear tech  to evaluate patient for elevated heart rate during last  minute of stress pictures. Patients speech was delayed,  NST on monitor. Aminophylline 75mg iv x 1 given, patient  hypertensive hydralzine 10mg IV x1 given. Rapid response  called and patient brought to ED."  * Review of raw data shows: Breast attenuation artifact.,  Diaphragmatic artifact.  * The left ventricle was normal LV size. There are small,  mild defects in mid anterior, mid anteroseptal  walls that  are predominantly reversible, suggestive of ischemia.There  are medium sized, moderate defects in basal and mid  inferior, basal and mid infero-lateral walls that are  minimally reversible suggestive of infarction with  mild  cathy-infarct ischemi  *  Hypokinesis of basal inferior and inferolateral wall.  Poststress LVEF  69%.    ------------------------------------------------------------------------      ------------------------------------------------------------------------    Confirmed on  10/23/2020 - 12:04:23 at [facility] by  Devin Limon MD   Cardiology Fellow: Deedee Kumar    < end of copied text >    OTHER: 	      LABS:	 	  CARDIAC MARKERS ( 23 Oct 2020 11:58 )  x     / 0.01 ng/mL / x     / x     / x      p-BNP 23 Oct 2020 11:58: 1313 pg/mL                          10.8   7.31  )-----------( 126      ( 25 Oct 2020 07:04 )             34.9     10-25    140  |  106  |  21.0<H>  ----------------------------<  193<H>  3.8   |  24.0  |  0.94    Ca    8.5<L>      25 Oct 2020 07:04  Mg     2.0     10-23    TPro  7.2  /  Alb  3.8  /  TBili  0.5  /  DBili  x   /  AST  21  /  ALT  17  /  AlkPhos  147<H>  10-23    proBNP: Serum Pro-Brain Natriuretic Peptide: 1313 pg/mL (10-23 @ 11:58)    Lipid Profile:   HgA1c:   TSH:       TELEMETRY: Reviewed  SR/SB         [place] [facility]                                                               Office: [address] [address]                                                              Telephone: [phone]. Fax:[phone]                                                                             PROGRESS NOTE  Reason for follow up: Chest Pain/Slurred Speech  Overnight: No new events.   Update: Patient still awaiting MRI/MRA. No further episodes of chest pain or slurred speech. Patient's BP still elevated today after yesterdays event with SB and hypotension. Patient with a mild headache at this time as well.       Review of symptoms:   Cardiac:  No chest pain. No dyspnea. No palpitations.  Respiratory: No cough. No dyspnea  Gastrointestinal: No diarrhea. No abdominal pain. No bleeding.     Past medical history: No updates.   	  Vitals:  T(C): 36.9 (10-25-20 @ 07:37), Max: 36.9 (10-25-20 @ 05:17)  HR: 74 (10-25-20 @ 09:20) (50 - 74)  BP: 127/69 (10-25-20 @ 09:20) (82/49 - 183/82)  RR: 18 (10-25-20 @ 07:37) (16 - 18)  SpO2: 97% (10-25-20 @ 07:37) (95% - 98%)  Wt(kg): --  I&O's Summary      PHYSICAL EXAM:  Constitutional: Comfortable . No acute distress.   HEENT: Atraumatic and normocephalic , neck is supple . no JVD. +left carotid bruit. PEERL   CNS: A&Ox3. No focal deficits. EOMI.   Lymph Nodes: Cervical : Not palpable.  Respiratory: CTAB   Cardiovascular: S1S2 RRR. + sys. murmur radiating to carotid, 2nd intercostal space   Gastrointestinal: Soft non-tender and non distended . +Bowel sounds. negative Suresh's sign.  Extremities: No edema.   Psychiatric: Calm . no agitation.  Skin: No skin rash/ulcers visualized to face, hands or feet.        CURRENT MEDICATIONS:  amLODIPine   Tablet 5 milliGRAM(s) Oral daily  hydrALAZINE 50 milliGRAM(s) Oral every 8 hours  isosorbide   mononitrate ER Tablet (IMDUR) 30 milliGRAM(s) Oral daily  losartan 100 milliGRAM(s) Oral daily    gabapentin  senna  atorvastatin  dextrose 50% Injectable  dextrose 50% Injectable  dextrose 50% Injectable  insulin glargine Injectable (LANTUS)  insulin lispro (ADMELOG) corrective regimen sliding scale  insulin lispro (ADMELOG) corrective regimen sliding scale  insulin lispro Injectable (ADMELOG)  levothyroxine  aspirin  chewable  dextrose 5%.      DIAGNOSTIC TESTING:  [ ] Echocardiogram:   < from: TTE Echo Complete w/o Contrast w/ Doppler (10.23.20 @ 15:38) >  PHYSICIAN INTERPRETATION:  LeftVentricle: The left ventricular internal cavity size is normal.  Global LV systolic function was normal. Left ventricular ejection fraction, by visual estimation, is 60 to 65%. Spectral Doppler shows pseudonormal pattern of left ventricular myocardial filling (Grade II diastolic dysfunction). Elevated mean left atrial pressure.  Right Ventricle: Normal right ventricular size and function.  Left Atrium: Moderately enlarged left atrium.  Right Atrium: Normal right atrial size.  Pericardium: There is no evidence of pericardial effusion.  Mitral Valve: Structurally normal mitral valve, with normal leaflet excursion. Mild thickening and calcification of the anterior and posterior mitral valve leaflets. There is moderate mitral annular calcification. Trace mitral valve regurgitation is seen.  Tricuspid Valve: Structurally normal tricuspid valve, with normal leaflet excursion. Trivial tricuspid regurgitation is visualized. Adequate TR velocity was not obtained to accurately assess RVSP.  Aortic Valve: The aortic valve is trileaflet. Moderate to severe aortic stenosis is present. Trivial aortic valve regurgitation is seen. The Dimesionless Index value is 0.29.  Pulmonic Valve: Mild pulmonic valve regurgitation.  Aorta: There is dilatation of the aortic root.  Venous: The inferior vena cava is normal. The inferior vena cava was normal sized, with respiratory size variation greater than 50%.      Summary:   1. Left ventricular ejection fraction, by visual estimation, is 60 to 65%.   2. Normal global left ventricular systolic function.   3. Spectral Doppler shows pseudonormal pattern of left ventricular myocardial filling (Grade II diastolic dysfunction). Elevated mean left atrial pressure.   4. Normal left ventricular internal cavity size.   5. There is mild concentric left ventricular hypertrophy.   6. Normal right ventricular size and function.   7. Moderately enlarged left atrium.   8. Normal right atrial size.   9. Moderate mitral annular calcification.  10. Mild thickening and calcification of the anterior and posterior mitral valve leaflets.  11. Trace mitral valve regurgitation.  12. Moderate to severe aortic valve stenosis. The Dimesionless Index value is 0.29.  13. Mild pulmonic valve regurgitation.  14. Dilatation of the aortic root.  15. There is no evidence of pericardial effusion.    MD Shellie Electronically signed on 10/24/2020 at 11:07:01 AM    < end of copied text >    [ ] Stress Test:    < from: Nuclear Stress Test-Pharmacologic (10.23.20 @ 09:34) >  NUCLEAR FINDINGS:  Review of raw data shows: Breast attenuation artifact.,  Diaphragmatic artifact.  The left ventricle was normal LV size. There are small,  mild defects in mid anterior, mid anteroseptalwalls that  are predominantly reversible, suggestive of ischemia.There  are medium sized, moderate defects in basal and mid  inferior, basal and mid infero-lateral walls that are  minimally reversible suggestive of infarction with  mild  cathy-infarctischemi  ------------------------------------------------------------------------      GATED ANALYSIS:   Hypokinesis of basal inferior and inferolateral wall.  Poststress LVEF  69%.  ------------------------------------------------------------------------    IMPRESSIONS:Abnormal Study  * Unable to walk due to covid pandemic precautions  * Chest Pain: No chest pain with administration of  Regadenoson.  * Symptom: No Symptom.  * HR Response: Appropriate.  * BP Response: Appropriate.  * Heart Rhythm: Normal Sinus Rhythm.  * ECG Abnormalities: There were no diagnostic changes.  * Arrhythmia: 1 VPDs occurred.  * As per Stress  lab NP notes: "was Called by nuclear tech  to evaluate patient for elevated heart rate during last  minute of stress pictures. Patients speech was delayed,  NST on monitor. Aminophylline 75mg iv x 1 given, patient  hypertensive hydralzine 10mg IV x1 given. Rapid response  called and patient brought to ED."  * Review of raw data shows: Breast attenuation artifact.,  Diaphragmatic artifact.  * The left ventricle was normal LV size. There are small,  mild defects in mid anterior, mid anteroseptal  walls that  are predominantly reversible, suggestive of ischemia.There  are medium sized, moderate defects in basal and mid  inferior, basal and mid infero-lateral walls that are  minimally reversible suggestive of infarction with  mild  cathy-infarct ischemi  *  Hypokinesis of basal inferior and inferolateral wall.  Poststress LVEF  69%.    ------------------------------------------------------------------------      ------------------------------------------------------------------------    Confirmed on  10/23/2020 - 12:04:23 at [facility] by  Devin Limon MD   Cardiology Fellow: Deedee Kumar    < end of copied text >    OTHER: 	      LABS:	 	  CARDIAC MARKERS ( 23 Oct 2020 11:58 )  x     / 0.01 ng/mL / x     / x     / x      p-BNP 23 Oct 2020 11:58: 1313 pg/mL                          10.8   7.31  )-----------( 126      ( 25 Oct 2020 07:04 )             34.9     10-25    140  |  106  |  21.0<H>  ----------------------------<  193<H>  3.8   |  24.0  |  0.94    Ca    8.5<L>      25 Oct 2020 07:04  Mg     2.0     10-23    TPro  7.2  /  Alb  3.8  /  TBili  0.5  /  DBili  x   /  AST  21  /  ALT  17  /  AlkPhos  147<H>  10-23    proBNP: Serum Pro-Brain Natriuretic Peptide: 1313 pg/mL (10-23 @ 11:58)      TELEMETRY: Reviewed  SR/SB

## 2020-10-25 NOTE — PROGRESS NOTE ADULT - SUBJECTIVE AND OBJECTIVE BOX
SERGIOYESSI PHILLIP  ----------------------------------------  The patient was seen and evaluated for hypertensive urgency. She reports feeling better today. Tolerating oral intake.    Vital Signs Last 24 Hrs  T(C): 36.4 (25 Oct 2020 10:55), Max: 36.9 (25 Oct 2020 05:17)  T(F): 97.6 (25 Oct 2020 10:55), Max: 98.5 (25 Oct 2020 05:17)  HR: 67 (25 Oct 2020 10:55) (58 - 74)  BP: 159/87 (25 Oct 2020 10:55) (110/56 - 183/82)  BP(mean): 107 (25 Oct 2020 05:17) (107 - 107)  RR: 16 (25 Oct 2020 10:55) (16 - 18)  SpO2: 99% (25 Oct 2020 10:55) (95% - 99%)    CAPILLARY BLOOD GLUCOSE  POCT Blood Glucose.: 281 mg/dL (25 Oct 2020 10:53)  POCT Blood Glucose.: 174 mg/dL (25 Oct 2020 07:42)  POCT Blood Glucose.: 389 mg/dL (24 Oct 2020 21:19)  POCT Blood Glucose.: 307 mg/dL (24 Oct 2020 16:50)  POCT Blood Glucose.: 266 mg/dL (24 Oct 2020 13:11)    PHYSICAL EXAMINATION:  ----------------------------------------  General appearance: No acute distress, Awake, Alert  HEENT: Normocephalic, Atraumatic, Conjunctiva clear, EOMI  Neck: Supple, No JVD, No tenderness  Lungs: Breath sound equal bilaterally, No wheezes, No rales  Cardiovascular: S1S2, Regular rhythm  Abdomen: Soft, Nontender, Nondistended, No guarding/rebound, Positive bowel sounds  Extremities: No clubbing, No cyanosis, No edema, No calf tenderness  Neuro: Strength equal bilaterally, No tremors  Psychiatric: Appropriate mood, Normal affect    LABORATORY STUDIES:  ----------------------------------------             10.8   7.31  )-----------( 126      ( 25 Oct 2020 07:04 )             34.9     10-25    140  |  106  |  21.0<H>  ----------------------------<  193<H>  3.8   |  24.0  |  0.94    Ca    8.5<L>      25 Oct 2020 07:04    MEDICATIONS  (STANDING):  amLODIPine   Tablet 5 milliGRAM(s) Oral daily  aspirin  chewable 81 milliGRAM(s) Oral daily  atorvastatin 40 milliGRAM(s) Oral at bedtime  dextrose 5%. 1000 milliLiter(s) (50 mL/Hr) IV Continuous <Continuous>  dextrose 50% Injectable 12.5 Gram(s) IV Push once  dextrose 50% Injectable 25 Gram(s) IV Push once  dextrose 50% Injectable 25 Gram(s) IV Push once  gabapentin 100 milliGRAM(s) Oral two times a day  hydrALAZINE 50 milliGRAM(s) Oral every 8 hours  insulin glargine Injectable (LANTUS) 15 Unit(s) SubCutaneous at bedtime  insulin lispro (ADMELOG) corrective regimen sliding scale   SubCutaneous three times a day before meals  insulin lispro (ADMELOG) corrective regimen sliding scale   SubCutaneous at bedtime  insulin lispro Injectable (ADMELOG) 8 Unit(s) SubCutaneous three times a day before meals  isosorbide   mononitrate ER Tablet (IMDUR) 30 milliGRAM(s) Oral daily  levothyroxine 137 MICROGram(s) Oral daily  losartan 100 milliGRAM(s) Oral daily  senna 1 Tablet(s) Oral at bedtime    MEDICATIONS  (PRN):  dextrose 40% Gel 15 Gram(s) Oral once PRN Blood Glucose LESS THAN 70 milliGRAM(s)/deciliter  glucagon  Injectable 1 milliGRAM(s) IntraMuscular once PRN Glucose LESS THAN 70 milligrams/deciliter  ondansetron Injectable 4 milliGRAM(s) IV Push every 6 hours PRN Nausea and/or Vomiting      ASSESSMENT / PLAN:  ----------------------------------------  70F with a history of coronary artery disease, CABG, hypertension, diabetes, and hypothyroidism who initially presented to the hospital for a nuclear stress test due to dyspnea on exertion. During the stress test, the patient was noted to have elevated blood pressures(200/110) and difficulty with speech and slow response to questions. A rapid response was activated and the patient transferred to the emergency department for evaluation.    Hypertensive emergency - Conntinue close monitoring. On hydralazine, isosorbide, and losartan. Carvedilol discontinued yesterday. Telemetry monitoring.    Slurred speech and confusion - CT of the head was without acute intracranial pathology. Awaiting MRI to further assess. No neurological deficits on examination.    Chest pain - Nuclear stress test results noted small mild reversible defects in mid anterior and mid anteroseptal walls as well as medium sized moderate defects in basal, mid inferior, basal and mid infero-lateral walls. Planned for cardiac catheterization. On aspirin and atorvastatin.    Diabetes - Insulin coverage, close monitoring of blood glucose levels.    Hypothyroidism - On levothyroxine.

## 2020-10-26 LAB
GLUCOSE BLDC GLUCOMTR-MCNC: 174 MG/DL — HIGH (ref 70–99)
GLUCOSE BLDC GLUCOMTR-MCNC: 197 MG/DL — HIGH (ref 70–99)
GLUCOSE BLDC GLUCOMTR-MCNC: 240 MG/DL — HIGH (ref 70–99)
GLUCOSE BLDC GLUCOMTR-MCNC: 294 MG/DL — HIGH (ref 70–99)
GLUCOSE BLDC GLUCOMTR-MCNC: 80 MG/DL — SIGNIFICANT CHANGE UP (ref 70–99)

## 2020-10-26 PROCEDURE — 99232 SBSQ HOSP IP/OBS MODERATE 35: CPT

## 2020-10-26 PROCEDURE — 99233 SBSQ HOSP IP/OBS HIGH 50: CPT

## 2020-10-26 RX ORDER — ENOXAPARIN SODIUM 100 MG/ML
40 INJECTION SUBCUTANEOUS DAILY
Refills: 0 | Status: DISCONTINUED | OUTPATIENT
Start: 2020-10-26 | End: 2020-10-31

## 2020-10-26 RX ORDER — AMLODIPINE BESYLATE 2.5 MG/1
5 TABLET ORAL ONCE
Refills: 0 | Status: COMPLETED | OUTPATIENT
Start: 2020-10-26 | End: 2020-10-26

## 2020-10-26 RX ORDER — HYDRALAZINE HCL 50 MG
10 TABLET ORAL ONCE
Refills: 0 | Status: COMPLETED | OUTPATIENT
Start: 2020-10-26 | End: 2020-10-26

## 2020-10-26 RX ORDER — GABAPENTIN 400 MG/1
100 CAPSULE ORAL THREE TIMES A DAY
Refills: 0 | Status: DISCONTINUED | OUTPATIENT
Start: 2020-10-26 | End: 2020-10-31

## 2020-10-26 RX ORDER — AMLODIPINE BESYLATE 2.5 MG/1
10 TABLET ORAL DAILY
Refills: 0 | Status: DISCONTINUED | OUTPATIENT
Start: 2020-10-27 | End: 2020-10-31

## 2020-10-26 RX ADMIN — GABAPENTIN 100 MILLIGRAM(S): 400 CAPSULE ORAL at 21:46

## 2020-10-26 RX ADMIN — ENOXAPARIN SODIUM 40 MILLIGRAM(S): 100 INJECTION SUBCUTANEOUS at 21:44

## 2020-10-26 RX ADMIN — Medication 2: at 08:06

## 2020-10-26 RX ADMIN — Medication 50 MILLIGRAM(S): at 21:46

## 2020-10-26 RX ADMIN — LOSARTAN POTASSIUM 100 MILLIGRAM(S): 100 TABLET, FILM COATED ORAL at 05:38

## 2020-10-26 RX ADMIN — Medication 8 UNIT(S): at 17:22

## 2020-10-26 RX ADMIN — Medication 10 MILLIGRAM(S): at 08:35

## 2020-10-26 RX ADMIN — AMLODIPINE BESYLATE 5 MILLIGRAM(S): 2.5 TABLET ORAL at 05:38

## 2020-10-26 RX ADMIN — Medication 81 MILLIGRAM(S): at 11:53

## 2020-10-26 RX ADMIN — SENNA PLUS 1 TABLET(S): 8.6 TABLET ORAL at 21:46

## 2020-10-26 RX ADMIN — Medication 50 MILLIGRAM(S): at 13:42

## 2020-10-26 RX ADMIN — Medication 6: at 17:22

## 2020-10-26 RX ADMIN — GABAPENTIN 100 MILLIGRAM(S): 400 CAPSULE ORAL at 13:42

## 2020-10-26 RX ADMIN — ATORVASTATIN CALCIUM 40 MILLIGRAM(S): 80 TABLET, FILM COATED ORAL at 21:46

## 2020-10-26 RX ADMIN — Medication 50 MILLIGRAM(S): at 05:38

## 2020-10-26 RX ADMIN — INSULIN GLARGINE 15 UNIT(S): 100 INJECTION, SOLUTION SUBCUTANEOUS at 21:45

## 2020-10-26 RX ADMIN — GABAPENTIN 100 MILLIGRAM(S): 400 CAPSULE ORAL at 05:38

## 2020-10-26 RX ADMIN — Medication 137 MICROGRAM(S): at 05:38

## 2020-10-26 RX ADMIN — AMLODIPINE BESYLATE 5 MILLIGRAM(S): 2.5 TABLET ORAL at 11:53

## 2020-10-26 RX ADMIN — ISOSORBIDE MONONITRATE 30 MILLIGRAM(S): 60 TABLET, EXTENDED RELEASE ORAL at 13:42

## 2020-10-26 RX ADMIN — Medication 8 UNIT(S): at 08:05

## 2020-10-26 NOTE — PROGRESS NOTE ADULT - SUBJECTIVE AND OBJECTIVE BOX
[place] [facility]ARDIOLOGY-[facility]                                                               Office: [address] [address]                                                              Telephone: [phone]. Fax:[phone]                                                                             PROGRESS NOTE  Reason for follow up: Chest pain with abnormal stress test/ Hypertensive urgency with dysarthria and mod to severe AS   Overnight: No new events.   Update:     Subjective: "  ______________________"      	  Vitals:  T(C): 37 (10-26-20 @ 07:18), Max: 37 (10-25-20 @ 15:21)  HR: 75 (10-26-20 @ 07:18) (66 - 76)  BP: 175/83 (10-26-20 @ 07:18) (127/69 - 194/83)  RR: 18 (10-26-20 @ 07:18) (16 - 20)  SpO2: 97% (10-26-20 @ 07:18) (97% - 99%)  Wt(kg): --  I&O's Summary          PHYSICAL EXAM:  Appearance: Comfortable. No acute distress  HEENT:  Head and neck: Atraumatic. Normocephalic.  Normal oral mucosa, PERRL, Neck is supple. No JVD, No carotid bruit.   Neurologic: A & O x 3, no focal deficits. EOMI.  Lymphatic: No cervical lymphadenopathy  Cardiovascular: Normal S1 S2, + mid to late peaking systolic ejection murmur radiates  Respiratory: Lungs clear to auscultation  Gastrointestinal:  Soft, Non-tender, + BS  Lower Extremities: No edema  Psychiatry: Patient is calm. No agitation. Mood & affect appropriate  Skin: No rashes/ ecchymoses/cyanosis/ulcers visualized on the face, hands or feet.      CURRENT MEDICATIONS:  amLODIPine   Tablet 5 milliGRAM(s) Oral daily  hydrALAZINE 50 milliGRAM(s) Oral every 8 hours  hydrALAZINE Injectable 10 milliGRAM(s) IV Push once  isosorbide   mononitrate ER Tablet (IMDUR) 30 milliGRAM(s) Oral daily  losartan 100 milliGRAM(s) Oral daily    gabapentin  senna  atorvastatin  dextrose 50% Injectable  dextrose 50% Injectable  dextrose 50% Injectable  insulin glargine Injectable (LANTUS)  insulin lispro (ADMELOG) corrective regimen sliding scale  insulin lispro (ADMELOG) corrective regimen sliding scale  insulin lispro Injectable (ADMELOG)  levothyroxine  aspirin  chewable  dextrose 5%.      DIAGNOSTIC TESTING:  [x ] Echocardiogram:   < from: TTE Echo Complete w/o Contrast w/ Doppler (10.23.20 @ 15:38) >  Summary:   1. Left ventricular ejection fraction, by visual estimation, is 60 to 65%.   2. Normal global left ventricular systolic function.   3. Spectral Doppler shows pseudonormal pattern of left ventricular myocardial filling (Grade II diastolic dysfunction). Elevated mean left atrial pressure.   4. Normal left ventricular internal cavity size.   5. There is mild concentric left ventricular hypertrophy.   6. Normal right ventricular size and function.   7. Moderately enlarged left atrium.   8. Normal right atrial size.   9. Moderate mitral annular calcification.  10. Mild thickening and calcification of the anterior and posterior mitral valve leaflets.  11. Trace mitral valve regurgitation.  12. Moderate to severe aortic valve stenosis. The Dimesionless Index value is 0.29.  13. Mild pulmonic valve regurgitation.  14. Dilatation of the aortic root.  15. There is no evidence of pericardial effusion.    MD Shellie Electronically signed on 10/24/2020 at 11:07:01 AM    < end of copied text >    [ ]  Catheterization:  [x ] Stress Test:    < from: Nuclear Stress Test-Pharmacologic (10.23.20 @ 09:34) >  IMPRESSIONS:Abnormal Study  * Unable to walk due to covid pandemic precautions  * Chest Pain: No chest pain with administration of  Regadenoson.  * Symptom: No Symptom.  * HR Response: Appropriate.  * BP Response: Appropriate.  * Heart Rhythm: Normal Sinus Rhythm.  * ECG Abnormalities: There were no diagnostic changes.  * Arrhythmia: 1 VPDs occurred.  * As per Stress  lab NP notes: "was Called by nuclear tech  to evaluate patient for elevated heart rate during last  minute of stress pictures. Patients speech was delayed,  NST on monitor. Aminophylline 75mg iv x 1 given, patient  hypertensive hydralzine 10mg IV x1 given. Rapid response  called and patient brought to ED."  * Review of raw data shows: Breast attenuation artifact.,  Diaphragmatic artifact.  * The left ventricle was normal LV size. There are small,  mild defects in mid anterior, mid anteroseptal  walls that  are predominantly reversible, suggestive of ischemia.There  are medium sized, moderate defects in basal and mid  inferior, basal and mid infero-lateral walls that are  minimally reversible suggestive of infarction with  mild  cathy-infarct ischemi  *  Hypokinesis of basal inferior and inferolateral wall.  Poststress LVEF  69%.    ------------------------------------------------------------------------      ------------------------------------------------------------------------    Confirmed on  10/23/2020 - 12:04:23 at [facility] Card[facility] by  Devin Limon MD   Cardiology Fellow: Deedee Kumar    < end of copied text >    OTHER: 	      LABS:	 	  CARDIAC MARKERS ( 23 Oct 2020 11:58 )  x     / 0.01 ng/mL / x     / x     / x      p-BNP 23 Oct 2020 11:58: 1313 pg/mL                          10.8   7.31  )-----------( 126      ( 25 Oct 2020 07:04 )             34.9     10-25    140  |  106  |  21.0<H>  ----------------------------<  193<H>  3.8   |  24.0  |  0.94    Ca    8.5<L>      25 Oct 2020 07:04      proBNP: Serum Pro-Brain Natriuretic Peptide: 1313 pg/mL (10-23 @ 11:58)    Lipid Profile:   HgA1c:   TSH:       TELEMETRY: Reviewed    ECG:  Reviewed by me. 	       [place] [facility]ARDIOLOGY-[facility]                                                               Office: [address] [address]                                                              Telephone: [phone]. Fax:[phone]                                                                             PROGRESS NOTE  Reason for follow up: Chest pain with abnormal stress test/ Hypertensive urgency with dysarthria and mod to severe AS   Overnight: No new events.   Update:   Patient had syncopal episode on Saturday w/ associated hypotension and bradycardia   TTE shows mod - severe AS with DI 0.29     Subjective: "  ______________________"      	  Vitals:  T(C): 37 (10-26-20 @ 07:18), Max: 37 (10-25-20 @ 15:21)  HR: 75 (10-26-20 @ 07:18) (66 - 76)  BP: 175/83 (10-26-20 @ 07:18) (127/69 - 194/83)  RR: 18 (10-26-20 @ 07:18) (16 - 20)  SpO2: 97% (10-26-20 @ 07:18) (97% - 99%)  Wt(kg): --  I&O's Summary          PHYSICAL EXAM:  Appearance: Comfortable. No acute distress  HEENT:  Head and neck: Atraumatic. Normocephalic.  Normal oral mucosa, PERRL, Neck is supple. No JVD, No carotid bruit.   Neurologic: A & O x 3, no focal deficits. EOMI.  Lymphatic: No cervical lymphadenopathy  Cardiovascular: Normal S1 S2, + mid to late peaking systolic ejection murmur radiates  Respiratory: Lungs clear to auscultation  Gastrointestinal:  Soft, Non-tender, + BS  Lower Extremities: No edema  Psychiatry: Patient is calm. No agitation. Mood & affect appropriate  Skin: No rashes/ ecchymoses/cyanosis/ulcers visualized on the face, hands or feet.      CURRENT MEDICATIONS:  amLODIPine   Tablet 5 milliGRAM(s) Oral daily  hydrALAZINE 50 milliGRAM(s) Oral every 8 hours  hydrALAZINE Injectable 10 milliGRAM(s) IV Push once  isosorbide   mononitrate ER Tablet (IMDUR) 30 milliGRAM(s) Oral daily  losartan 100 milliGRAM(s) Oral daily    gabapentin  senna  atorvastatin  dextrose 50% Injectable  dextrose 50% Injectable  dextrose 50% Injectable  insulin glargine Injectable (LANTUS)  insulin lispro (ADMELOG) corrective regimen sliding scale  insulin lispro (ADMELOG) corrective regimen sliding scale  insulin lispro Injectable (ADMELOG)  levothyroxine  aspirin  chewable  dextrose 5%.      DIAGNOSTIC TESTING:  [x ] Echocardiogram:   < from: TTE Echo Complete w/o Contrast w/ Doppler (10.23.20 @ 15:38) >  Summary:   1. Left ventricular ejection fraction, by visual estimation, is 60 to 65%.   2. Normal global left ventricular systolic function.   3. Spectral Doppler shows pseudonormal pattern of left ventricular myocardial filling (Grade II diastolic dysfunction). Elevated mean left atrial pressure.   4. Normal left ventricular internal cavity size.   5. There is mild concentric left ventricular hypertrophy.   6. Normal right ventricular size and function.   7. Moderately enlarged left atrium.   8. Normal right atrial size.   9. Moderate mitral annular calcification.  10. Mild thickening and calcification of the anterior and posterior mitral valve leaflets.  11. Trace mitral valve regurgitation.  12. Moderate to severe aortic valve stenosis. The Dimesionless Index value is 0.29.  13. Mild pulmonic valve regurgitation.  14. Dilatation of the aortic root.  15. There is no evidence of pericardial effusion.    MD Shellie Electronically signed on 10/24/2020 at 11:07:01 AM    < end of copied text >    [ ]  Catheterization:  [x ] Stress Test:    < from: Nuclear Stress Test-Pharmacologic (10.23.20 @ 09:34) >  IMPRESSIONS:Abnormal Study  * Unable to walk due to covid pandemic precautions  * Chest Pain: No chest pain with administration of  Regadenoson.  * Symptom: No Symptom.  * HR Response: Appropriate.  * BP Response: Appropriate.  * Heart Rhythm: Normal Sinus Rhythm.  * ECG Abnormalities: There were no diagnostic changes.  * Arrhythmia: 1 VPDs occurred.  * As per Stress  lab NP notes: "was Called by nuclear tech  to evaluate patient for elevated heart rate during last  minute of stress pictures. Patients speech was delayed,  NST on monitor. Aminophylline 75mg iv x 1 given, patient  hypertensive hydralzine 10mg IV x1 given. Rapid response  called and patient brought to ED."  * Review of raw data shows: Breast attenuation artifact.,  Diaphragmatic artifact.  * The left ventricle was normal LV size. There are small,  mild defects in mid anterior, mid anteroseptal  walls that  are predominantly reversible, suggestive of ischemia.There  are medium sized, moderate defects in basal and mid  inferior, basal and mid infero-lateral walls that are  minimally reversible suggestive of infarction with  mild  cathy-infarct ischemi  *  Hypokinesis of basal inferior and inferolateral wall.  Poststress LVEF  69%.    ------------------------------------------------------------------------      ------------------------------------------------------------------------    Confirmed on  10/23/2020 - 12:04:23 at [facility] by  Devin Limon MD   Cardiology Fellow: Deedee Kumar    < end of copied text >    OTHER: 	      LABS:	 	  CARDIAC MARKERS ( 23 Oct 2020 11:58 )  x     / 0.01 ng/mL / x     / x     / x      p-BNP 23 Oct 2020 11:58: 1313 pg/mL                          10.8   7.31  )-----------( 126      ( 25 Oct 2020 07:04 )             34.9     10-25    140  |  106  |  21.0<H>  ----------------------------<  193<H>  3.8   |  24.0  |  0.94    Ca    8.5<L>      25 Oct 2020 07:04      proBNP: Serum Pro-Brain Natriuretic Peptide: 1313 pg/mL (10-23 @ 11:58)    Lipid Profile:   HgA1c:   TSH:       TELEMETRY: Reviewed by me; normal sinus rhythm   ECG:  Reviewed by me. 	10/24/2020: Sinus Bradycardia @ 54 bpm no changes compared to prior EKG        [place] [facility]ARDIOLOGY-[facility]                                                               Office: [address] [address]                                                              Telephone: [phone]. Fax:[phone]                                                                             PROGRESS NOTE  Reason for follow up: Chest pain with abnormal stress test/ Hypertensive urgency with dysarthria and mod to severe AS   Overnight: No new events.   Update:   Patient had syncopal episode on Saturday w/ associated hypotension and bradycardia   TTE shows mod - severe AS with DI 0.29  awaiting MRI/MRA      Subjective: "  ___no complaints ___________________"      	  Vitals:  T(C): 37 (10-26-20 @ 07:18), Max: 37 (10-25-20 @ 15:21)  HR: 75 (10-26-20 @ 07:18) (66 - 76)  BP: 175/83 (10-26-20 @ 07:18) (127/69 - 194/83)  RR: 18 (10-26-20 @ 07:18) (16 - 20)  SpO2: 97% (10-26-20 @ 07:18) (97% - 99%)  Wt(kg): --  I&O's Summary          PHYSICAL EXAM:  Appearance: Comfortable. No acute distress  HEENT:  Head and neck: Atraumatic. Normocephalic.  Normal oral mucosa, PERRL, Neck is supple. No JVD, No carotid bruit.   Neurologic: A & O x 3, no focal deficits. EOMI.  Lymphatic: No cervical lymphadenopathy  Cardiovascular: Normal S1 S2, + mid to late peaking systolic ejection murmur radiates  Respiratory: Lungs clear to auscultation  Gastrointestinal:  Soft, Non-tender, + BS  Lower Extremities: No edema  Psychiatry: Patient is calm. No agitation. Mood & affect appropriate  Skin: No rashes/ ecchymoses/cyanosis/ulcers visualized on the face, hands or feet.      CURRENT MEDICATIONS:  amLODIPine   Tablet 5 milliGRAM(s) Oral daily  hydrALAZINE 50 milliGRAM(s) Oral every 8 hours  hydrALAZINE Injectable 10 milliGRAM(s) IV Push once  isosorbide   mononitrate ER Tablet (IMDUR) 30 milliGRAM(s) Oral daily  losartan 100 milliGRAM(s) Oral daily    gabapentin  senna  atorvastatin  dextrose 50% Injectable  dextrose 50% Injectable  dextrose 50% Injectable  insulin glargine Injectable (LANTUS)  insulin lispro (ADMELOG) corrective regimen sliding scale  insulin lispro (ADMELOG) corrective regimen sliding scale  insulin lispro Injectable (ADMELOG)  levothyroxine  aspirin  chewable  dextrose 5%.      DIAGNOSTIC TESTING:  [x ] Echocardiogram:   < from: TTE Echo Complete w/o Contrast w/ Doppler (10.23.20 @ 15:38) >  Summary:   1. Left ventricular ejection fraction, by visual estimation, is 60 to 65%.   2. Normal global left ventricular systolic function.   3. Spectral Doppler shows pseudonormal pattern of left ventricular myocardial filling (Grade II diastolic dysfunction). Elevated mean left atrial pressure.   4. Normal left ventricular internal cavity size.   5. There is mild concentric left ventricular hypertrophy.   6. Normal right ventricular size and function.   7. Moderately enlarged left atrium.   8. Normal right atrial size.   9. Moderate mitral annular calcification.  10. Mild thickening and calcification of the anterior and posterior mitral valve leaflets.  11. Trace mitral valve regurgitation.  12. Moderate to severe aortic valve stenosis. The Dimesionless Index value is 0.29.  13. Mild pulmonic valve regurgitation.  14. Dilatation of the aortic root.  15. There is no evidence of pericardial effusion.    MD Shellie Electronically signed on 10/24/2020 at 11:07:01 AM    < end of copied text >    [ ]  Catheterization:  [x ] Stress Test:    < from: Nuclear Stress Test-Pharmacologic (10.23.20 @ 09:34) >  IMPRESSIONS:Abnormal Study  * Unable to walk due to covid pandemic precautions  * Chest Pain: No chest pain with administration of  Regadenoson.  * Symptom: No Symptom.  * HR Response: Appropriate.  * BP Response: Appropriate.  * Heart Rhythm: Normal Sinus Rhythm.  * ECG Abnormalities: There were no diagnostic changes.  * Arrhythmia: 1 VPDs occurred.  * As per Stress  lab NP notes: "was Called by nuclear tech  to evaluate patient for elevated heart rate during last  minute of stress pictures. Patients speech was delayed,  NST on monitor. Aminophylline 75mg iv x 1 given, patient  hypertensive hydralzine 10mg IV x1 given. Rapid response  called and patient brought to ED."  * Review of raw data shows: Breast attenuation artifact.,  Diaphragmatic artifact.  * The left ventricle was normal LV size. There are small,  mild defects in mid anterior, mid anteroseptal  walls that  are predominantly reversible, suggestive of ischemia.There  are medium sized, moderate defects in basal and mid  inferior, basal and mid infero-lateral walls that are  minimally reversible suggestive of infarction with  mild  cathy-infarct ischemi  *  Hypokinesis of basal inferior and inferolateral wall.  Poststress LVEF  69%.    ------------------------------------------------------------------------      ------------------------------------------------------------------------    Confirmed on  10/23/2020 - 12:04:23 at [facility] Cardiology by  Devin Limon MD   Cardiology Fellow: Deedee Kumar    < end of copied text >    OTHER: 	      LABS:	 	  CARDIAC MARKERS ( 23 Oct 2020 11:58 )  x     / 0.01 ng/mL / x     / x     / x      p-BNP 23 Oct 2020 11:58: 1313 pg/mL                          10.8   7.31  )-----------( 126      ( 25 Oct 2020 07:04 )             34.9     10-25    140  |  106  |  21.0<H>  ----------------------------<  193<H>  3.8   |  24.0  |  0.94    Ca    8.5<L>      25 Oct 2020 07:04      proBNP: Serum Pro-Brain Natriuretic Peptide: 1313 pg/mL (10-23 @ 11:58)    Lipid Profile:   HgA1c:   TSH:       TELEMETRY: Reviewed by me; normal sinus rhythm   ECG:  Reviewed by me. 	10/24/2020: Sinus Bradycardia @ 54 bpm no changes compared to prior EKG

## 2020-10-26 NOTE — PROGRESS NOTE ADULT - SUBJECTIVE AND OBJECTIVE BOX
Interval History:  no new issues  MEDICATIONS  (STANDING):  aspirin  chewable 81 milliGRAM(s) Oral daily  atorvastatin 40 milliGRAM(s) Oral at bedtime  dextrose 5%. 1000 milliLiter(s) (50 mL/Hr) IV Continuous <Continuous>  dextrose 50% Injectable 12.5 Gram(s) IV Push once  dextrose 50% Injectable 25 Gram(s) IV Push once  dextrose 50% Injectable 25 Gram(s) IV Push once  enoxaparin Injectable 40 milliGRAM(s) SubCutaneous daily  gabapentin 100 milliGRAM(s) Oral three times a day  hydrALAZINE 50 milliGRAM(s) Oral every 8 hours  insulin glargine Injectable (LANTUS) 15 Unit(s) SubCutaneous at bedtime  insulin lispro (ADMELOG) corrective regimen sliding scale   SubCutaneous three times a day before meals  insulin lispro (ADMELOG) corrective regimen sliding scale   SubCutaneous at bedtime  insulin lispro Injectable (ADMELOG) 8 Unit(s) SubCutaneous three times a day before meals  isosorbide   mononitrate ER Tablet (IMDUR) 30 milliGRAM(s) Oral daily  levothyroxine 137 MICROGram(s) Oral daily  losartan 100 milliGRAM(s) Oral daily  senna 1 Tablet(s) Oral at bedtime    MEDICATIONS  (PRN):  dextrose 40% Gel 15 Gram(s) Oral once PRN Blood Glucose LESS THAN 70 milliGRAM(s)/deciliter  glucagon  Injectable 1 milliGRAM(s) IntraMuscular once PRN Glucose LESS THAN 70 milligrams/deciliter  ondansetron Injectable 4 milliGRAM(s) IV Push every 6 hours PRN Nausea and/or Vomiting      Allergies    No Known Allergies    Intolerances        PHYSICAL EXAM:  Vital Signs Last 24 Hrs  T(F): 98.2 (10-26-20 @ 08:07)  HR: 72 (10-26-20 @ 11:51)  BP: 160/83 (10-26-20 @ 13:42)  RR: 18 (10-26-20 @ 08:07)    NERVOUS SYSTEM:  Alert & Oriented X3, speech mild dysarthira. folllws all commands, milf right facial droop, right ue and le 4+/5, left side   LABS:                        10.8   7.31  )-----------( 126      ( 25 Oct 2020 07:04 )             34.9     10-25    140  |  106  |  21.0<H>  ----------------------------<  193<H>  3.8   |  24.0  |  0.94    Ca    8.5<L>      25 Oct 2020 07:04            RADIOLOGY & ADDITIONAL STUDIES:

## 2020-10-26 NOTE — PROGRESS NOTE ADULT - ASSESSMENT
possible hypertensive encephalopathy, ?new vs old small lacune on ct, waiting for mri amd mra head and neck, eeg nos eizures bit does show mild slowing left side, contunue antiplatelts.

## 2020-10-26 NOTE — PROGRESS NOTE ADULT - ASSESSMENT
69 y/o female with medical history DM Type I, CAD s/p PCI CABGX4, Stage III CKD, CVA, Anemia, hypothyroidism, who presents to [facility]-ED for sudden onset slurred speech and chest pain during nuclear stress test.     Nuclear stress test: The left ventricle was normal LV size. There are small, mild defects in mid anterior, mid anteroseptal  walls that are predominantly reversible, suggestive of ischemia.There are medium sized, moderate defects in basal and mid inferior, basal and mid infero-lateral walls that are minimally reversible suggestive of infarction with  mild cathy-infarct ischemic *  Hypokinesis of basal inferior and inferolateral wall. Poststress LVEF  69%.  TTE:  1. Left ventricular ejection fraction, by visual estimation, is 60 to 65%. Moderate to severe aortic valve stenosis. The Dimensionless Index value is 0.29. Mild pulmonic valve regurgitation. Dilatation of the aortic root.    Plan   1) Cardiac  a) Dyspnea on exertion possible anginal equivalent   - patient is euvolemic on physical examination  - abnormal Nuclear stress test   - continue with current home regimen for blood pressure  - continue with ASA and statin    b) CAD s/p CABG (LIMA -> 1st diag/ SVG ->2nd diag/SVG-> OM1 and RPDA)   - dyspnea on exertion likely anginal equivalent with abnormal nuclear stress test    - as stated above plan for cath post neuro work up     c) HTN Urgency    - blood pressure uncontrolled   - continue with home blood pressure regimen   - increase Norvasc 10mg po q daily and continue to monitor vitals   - Coreg 12.5mg po q 12hrs discontinued due to possible hypotension with vasovagal syncope possibly secondary to mod to severe AS / Hydralazine 50mg po q 6hrs / Losartan 100 mg po q daily     d) Moderate to Severe AS, DI 0.29  - will need to further assess gradients with LHC/RHC     2) Neurology   a) TIA ( w/ slurred speech), prior h/o CVA in 2004   - currently baseline mental status   - CT head negative for any acute intracranial pathology   - Neurology consult appreciated   - MRI /MRAs, w/ EEG   - post Neurology work up would arrange for LHC   - continue with ASA and Lipitor    Thank You  Gavi VILLALTAO.   Cardiology. 71 y/o female with medical history DM Type I, CAD s/p PCI CABGX4, Stage III CKD, CVA, Anemia, hypothyroidism, who presents to [facility]-ED for sudden onset slurred speech and chest pain during nuclear stress test.     Nuclear stress test: The left ventricle was normal LV size. There are small, mild defects in mid anterior, mid anteroseptal  walls that are predominantly reversible, suggestive of ischemia.There are medium sized, moderate defects in basal and mid inferior, basal and mid infero-lateral walls that are minimally reversible suggestive of infarction with  mild cathy-infarct ischemic *  Hypokinesis of basal inferior and inferolateral wall. Poststress LVEF  69%.  TTE:  1. Left ventricular ejection fraction, by visual estimation, is 60 to 65%. Moderate to severe aortic valve stenosis. The Dimensionless Index value is 0.29. Mild pulmonic valve regurgitation. Dilatation of the aortic root.    Plan   1) Cardiac  a) Dyspnea on exertion possible anginal equivalent   - patient is euvolemic on physical examination  - abnormal Nuclear stress test   - continue with current home regimen for blood pressure  - continue with ASA and statin    b) CAD s/p CABG (LIMA -> 1st diag/ SVG ->2nd diag/SVG-> OM1 and RPDA)   - dyspnea on exertion likely anginal equivalent with abnormal nuclear stress test    - as stated above plan for cath post neuro work up     c) HTN Urgency    - blood pressure uncontrolled   - continue with home blood pressure regimen   - increase Norvasc 10mg po q daily and continue to monitor vitals   - Coreg 12.5mg po q 12hrs discontinued due to possible hypotension with vasovagal syncope possibly secondary to mod to severe AS / Hydralazine 50mg po q 6hrs / Losartan 100 mg po q daily     d) Moderate to Severe AS, DI 0.29  - will need to further assess gradients with LHC/RHC   - once cleared from Neurology will proceed with above work up     2) Neurology   a) TIA ( w/ slurred speech), prior h/o CVA in 2004   - currently baseline mental status   - CT head negative for any acute intracranial pathology   - Neurology consult appreciated   - MRI /MRAs, w/ EEG   - post Neurology work up would arrange for LHC   - continue with ASA and Lipitor    Thank You  Gavi Sahu D.O.   Cardiology.

## 2020-10-26 NOTE — PROGRESS NOTE ADULT - SUBJECTIVE AND OBJECTIVE BOX
SERGIO PHILLIP  ----------------------------------------  The patient was seen and evaluated for hypertensive urgency. Reports mild headache. Denied chest pain or dyspnea.    Vital Signs Last 24 Hrs  T(C): 36.8 (26 Oct 2020 08:07), Max: 37 (25 Oct 2020 15:21)  T(F): 98.2 (26 Oct 2020 08:07), Max: 98.6 (25 Oct 2020 15:21)  HR: 65 (26 Oct 2020 09:30) (65 - 76)  BP: 186/88 (26 Oct 2020 09:30) (159/87 - 194/83)  BP(mean): --  RR: 18 (26 Oct 2020 08:07) (16 - 20)  SpO2: 97% (26 Oct 2020 08:07) (97% - 99%)    CAPILLARY BLOOD GLUCOSE  POCT Blood Glucose.: 197 mg/dL (26 Oct 2020 08:05)  POCT Blood Glucose.: 166 mg/dL (25 Oct 2020 21:11)  POCT Blood Glucose.: 252 mg/dL (25 Oct 2020 16:02)  POCT Blood Glucose.: 281 mg/dL (25 Oct 2020 10:53)    PHYSICAL EXAMINATION:  ----------------------------------------  General appearance: No acute distress, Awake, Alert  HEENT: Normocephalic, Atraumatic, Conjunctiva clear, EOMI  Neck: Supple, No JVD, No tenderness  Lungs: Breath sound equal bilaterally, No wheezes, No rales  Cardiovascular: S1S2, Regular rhythm  Abdomen: Soft, Nontender, Nondistended, No guarding/rebound, Positive bowel sounds  Extremities: No clubbing, No cyanosis, No edema, No calf tenderness  Neuro: Strength equal bilaterally, No tremors  Psychiatric: Appropriate mood, Normal affect    LABORATORY STUDIES:  ----------------------------------------             10.8   7.31  )-----------( 126      ( 25 Oct 2020 07:04 )             34.9     10-25    140  |  106  |  21.0<H>  ----------------------------<  193<H>  3.8   |  24.0  |  0.94    Ca    8.5<L>      25 Oct 2020 07:04    MEDICATIONS  (STANDING):  amLODIPine   Tablet 5 milliGRAM(s) Oral daily  aspirin  chewable 81 milliGRAM(s) Oral daily  atorvastatin 40 milliGRAM(s) Oral at bedtime  dextrose 5%. 1000 milliLiter(s) (50 mL/Hr) IV Continuous <Continuous>  dextrose 50% Injectable 12.5 Gram(s) IV Push once  dextrose 50% Injectable 25 Gram(s) IV Push once  dextrose 50% Injectable 25 Gram(s) IV Push once  gabapentin 100 milliGRAM(s) Oral two times a day  hydrALAZINE 50 milliGRAM(s) Oral every 8 hours  insulin glargine Injectable (LANTUS) 15 Unit(s) SubCutaneous at bedtime  insulin lispro (ADMELOG) corrective regimen sliding scale   SubCutaneous three times a day before meals  insulin lispro (ADMELOG) corrective regimen sliding scale   SubCutaneous at bedtime  insulin lispro Injectable (ADMELOG) 8 Unit(s) SubCutaneous three times a day before meals  isosorbide   mononitrate ER Tablet (IMDUR) 30 milliGRAM(s) Oral daily  levothyroxine 137 MICROGram(s) Oral daily  losartan 100 milliGRAM(s) Oral daily  senna 1 Tablet(s) Oral at bedtime    MEDICATIONS  (PRN):  dextrose 40% Gel 15 Gram(s) Oral once PRN Blood Glucose LESS THAN 70 milliGRAM(s)/deciliter  glucagon  Injectable 1 milliGRAM(s) IntraMuscular once PRN Glucose LESS THAN 70 milligrams/deciliter  ondansetron Injectable 4 milliGRAM(s) IV Push every 6 hours PRN Nausea and/or Vomiting      ASSESSMENT / PLAN:  ----------------------------------------  70F with a history of coronary artery disease, CABG, hypertension, diabetes, and hypothyroidism who initially presented to the hospital for a nuclear stress test due to dyspnea on exertion. During the stress test, the patient was noted to have elevated blood pressures(200/110) and difficulty with speech and slow response to questions. A rapid response was activated and the patient transferred to the emergency department for evaluation.    Hypertensive emergency - Persistent episodes of hypertension. On hydralazine, isosorbide, and losartan. Carvedilol previously discontinued. Amlodipine dose adjusted  Telemetry monitoring.    Slurred speech and confusion - CT of the head was without acute intracranial pathology. Awaiting MRI to further assess. No neurological deficits on examination.    Chest pain - Nuclear stress test results noted small mild reversible defects in mid anterior and mid anteroseptal walls as well as medium sized moderate defects in basal, mid inferior, basal and mid infero-lateral walls. Planned for eventual cardiac catheterization. On aspirin and atorvastatin.    Diabetes - Insulin coverage, close monitoring of blood glucose levels.    Hypothyroidism - On levothyroxine.

## 2020-10-27 ENCOUNTER — TRANSCRIPTION ENCOUNTER (OUTPATIENT)
Age: 70
End: 2020-10-27

## 2020-10-27 DIAGNOSIS — I67.4 HYPERTENSIVE ENCEPHALOPATHY: ICD-10-CM

## 2020-10-27 LAB
ANION GAP SERPL CALC-SCNC: 12 MMOL/L — SIGNIFICANT CHANGE UP (ref 5–17)
BUN SERPL-MCNC: 18 MG/DL — SIGNIFICANT CHANGE UP (ref 8–20)
CALCIUM SERPL-MCNC: 8.7 MG/DL — SIGNIFICANT CHANGE UP (ref 8.6–10.2)
CHLORIDE SERPL-SCNC: 105 MMOL/L — SIGNIFICANT CHANGE UP (ref 98–107)
CHOLEST SERPL-MCNC: 167 MG/DL — SIGNIFICANT CHANGE UP
CO2 SERPL-SCNC: 23 MMOL/L — SIGNIFICANT CHANGE UP (ref 22–29)
CREAT SERPL-MCNC: 1.01 MG/DL — SIGNIFICANT CHANGE UP (ref 0.5–1.3)
GLUCOSE BLDC GLUCOMTR-MCNC: 125 MG/DL — HIGH (ref 70–99)
GLUCOSE BLDC GLUCOMTR-MCNC: 128 MG/DL — HIGH (ref 70–99)
GLUCOSE BLDC GLUCOMTR-MCNC: 172 MG/DL — HIGH (ref 70–99)
GLUCOSE BLDC GLUCOMTR-MCNC: 289 MG/DL — HIGH (ref 70–99)
GLUCOSE BLDC GLUCOMTR-MCNC: 73 MG/DL — SIGNIFICANT CHANGE UP (ref 70–99)
GLUCOSE BLDC GLUCOMTR-MCNC: 78 MG/DL — SIGNIFICANT CHANGE UP (ref 70–99)
GLUCOSE SERPL-MCNC: 101 MG/DL — HIGH (ref 70–99)
HDLC SERPL-MCNC: 66 MG/DL — SIGNIFICANT CHANGE UP
LIPID PNL WITH DIRECT LDL SERPL: 89 MG/DL — SIGNIFICANT CHANGE UP
NON HDL CHOLESTEROL: 101 MG/DL — SIGNIFICANT CHANGE UP
POTASSIUM SERPL-MCNC: 3.9 MMOL/L — SIGNIFICANT CHANGE UP (ref 3.5–5.3)
POTASSIUM SERPL-SCNC: 3.9 MMOL/L — SIGNIFICANT CHANGE UP (ref 3.5–5.3)
SODIUM SERPL-SCNC: 140 MMOL/L — SIGNIFICANT CHANGE UP (ref 135–145)
TRIGL SERPL-MCNC: 61 MG/DL — SIGNIFICANT CHANGE UP

## 2020-10-27 PROCEDURE — 70547 MR ANGIOGRAPHY NECK W/O DYE: CPT | Mod: 26

## 2020-10-27 PROCEDURE — 70551 MRI BRAIN STEM W/O DYE: CPT | Mod: 26

## 2020-10-27 PROCEDURE — 99232 SBSQ HOSP IP/OBS MODERATE 35: CPT

## 2020-10-27 PROCEDURE — 70544 MR ANGIOGRAPHY HEAD W/O DYE: CPT | Mod: 26,59

## 2020-10-27 RX ORDER — ACETAMINOPHEN 500 MG
650 TABLET ORAL EVERY 6 HOURS
Refills: 0 | Status: DISCONTINUED | OUTPATIENT
Start: 2020-10-27 | End: 2020-10-31

## 2020-10-27 RX ADMIN — Medication 8 UNIT(S): at 12:30

## 2020-10-27 RX ADMIN — Medication 2: at 22:10

## 2020-10-27 RX ADMIN — Medication 50 MILLIGRAM(S): at 12:30

## 2020-10-27 RX ADMIN — ATORVASTATIN CALCIUM 40 MILLIGRAM(S): 80 TABLET, FILM COATED ORAL at 22:05

## 2020-10-27 RX ADMIN — Medication 650 MILLIGRAM(S): at 15:42

## 2020-10-27 RX ADMIN — INSULIN GLARGINE 15 UNIT(S): 100 INJECTION, SOLUTION SUBCUTANEOUS at 22:09

## 2020-10-27 RX ADMIN — ENOXAPARIN SODIUM 40 MILLIGRAM(S): 100 INJECTION SUBCUTANEOUS at 22:05

## 2020-10-27 RX ADMIN — GABAPENTIN 100 MILLIGRAM(S): 400 CAPSULE ORAL at 12:30

## 2020-10-27 RX ADMIN — GABAPENTIN 100 MILLIGRAM(S): 400 CAPSULE ORAL at 05:48

## 2020-10-27 RX ADMIN — GABAPENTIN 100 MILLIGRAM(S): 400 CAPSULE ORAL at 22:09

## 2020-10-27 RX ADMIN — Medication 8 UNIT(S): at 08:52

## 2020-10-27 RX ADMIN — Medication 81 MILLIGRAM(S): at 12:30

## 2020-10-27 RX ADMIN — SENNA PLUS 1 TABLET(S): 8.6 TABLET ORAL at 22:05

## 2020-10-27 RX ADMIN — Medication 650 MILLIGRAM(S): at 16:12

## 2020-10-27 RX ADMIN — LOSARTAN POTASSIUM 100 MILLIGRAM(S): 100 TABLET, FILM COATED ORAL at 05:47

## 2020-10-27 RX ADMIN — AMLODIPINE BESYLATE 10 MILLIGRAM(S): 2.5 TABLET ORAL at 05:47

## 2020-10-27 RX ADMIN — Medication 50 MILLIGRAM(S): at 05:47

## 2020-10-27 RX ADMIN — ISOSORBIDE MONONITRATE 30 MILLIGRAM(S): 60 TABLET, EXTENDED RELEASE ORAL at 12:30

## 2020-10-27 RX ADMIN — Medication 50 MILLIGRAM(S): at 22:04

## 2020-10-27 RX ADMIN — Medication 137 MICROGRAM(S): at 05:47

## 2020-10-27 NOTE — PROGRESS NOTE ADULT - SUBJECTIVE AND OBJECTIVE BOX
INTERVAL HISTORY:  Seen at bedside and no new concerns overnight.    No Known Allergies      VITAL SIGNS:  Vital Signs Last 24 Hrs  T(C): 37 (27 Oct 2020 05:41), Max: 37.4 (26 Oct 2020 22:10)  T(F): 98.6 (27 Oct 2020 05:41), Max: 99.4 (26 Oct 2020 22:10)  HR: 73 (27 Oct 2020 05:41) (72 - 74)  BP: 167/93 (27 Oct 2020 05:41) (121/66 - 175/95)  BP(mean): --  RR: 18 (27 Oct 2020 05:41) (18 - 18)  SpO2: 96% (27 Oct 2020 05:41) (94% - 96%)    PHYSICAL EXAMINATION:  General: Well-developed, well nourished, in no acute distress.  Eyes: Conjunctiva and sclera clear.  Neurologic:  - Mental Status:  Alert, awake, oriented to person, place, and time; Speech is normal; Affect is normal.  - Cranial Nerves: II-XI intact.  - Motor:  Strength is 5/5 throughout.  There is no pronator drift.  Normal muscle bulk and tone throughout.  - Reflexes:  2+ throughout  - Sensory:  Intact to light touch, pin prick, vibration, and joint-position sense throughout.  - Coordination:  No dysmetria/dysdiadochokinesis.    MEDS:  MEDICATIONS  (STANDING):  amLODIPine   Tablet 10 milliGRAM(s) Oral daily  aspirin  chewable 81 milliGRAM(s) Oral daily  atorvastatin 40 milliGRAM(s) Oral at bedtime  dextrose 5%. 1000 milliLiter(s) (50 mL/Hr) IV Continuous <Continuous>  dextrose 50% Injectable 12.5 Gram(s) IV Push once  dextrose 50% Injectable 25 Gram(s) IV Push once  dextrose 50% Injectable 25 Gram(s) IV Push once  enoxaparin Injectable 40 milliGRAM(s) SubCutaneous daily  gabapentin 100 milliGRAM(s) Oral three times a day  hydrALAZINE 50 milliGRAM(s) Oral every 8 hours  insulin glargine Injectable (LANTUS) 15 Unit(s) SubCutaneous at bedtime  insulin lispro (ADMELOG) corrective regimen sliding scale   SubCutaneous three times a day before meals  insulin lispro (ADMELOG) corrective regimen sliding scale   SubCutaneous at bedtime  insulin lispro Injectable (ADMELOG) 8 Unit(s) SubCutaneous three times a day before meals  isosorbide   mononitrate ER Tablet (IMDUR) 30 milliGRAM(s) Oral daily  levothyroxine 137 MICROGram(s) Oral daily  losartan 100 milliGRAM(s) Oral daily  senna 1 Tablet(s) Oral at bedtime    MEDICATIONS  (PRN):  dextrose 40% Gel 15 Gram(s) Oral once PRN Blood Glucose LESS THAN 70 milliGRAM(s)/deciliter  glucagon  Injectable 1 milliGRAM(s) IntraMuscular once PRN Glucose LESS THAN 70 milligrams/deciliter  ondansetron Injectable 4 milliGRAM(s) IV Push every 6 hours PRN Nausea and/or Vomiting      LABS:      10-27    140  |  105  |  18.0  ----------------------------<  101<H>  3.9   |  23.0  |  1.01    Ca    8.7      27 Oct 2020 06:48            RADIOLOGY & ADDITIONAL STUDIES:      < from: CT Head No Cont (10.23.20 @ 13:50) >  No acute intracranial hemorrhage or mass effect.    10/23/20  EEG CLASSIFICATION:   Findings: Abnormal. intermittent polymorphic delta activity, focal, left centrotemporal region.    INTERPRETATION:   No seizure sequences occurred. No epileptiform abnormalities noted.     This is an abnormal EEG indicating moderate left centro temporal focal cerebral dysfunction.

## 2020-10-27 NOTE — DISCHARGE NOTE NURSING/CASE MANAGEMENT/SOCIAL WORK - NSDCPEPTSTRK_GEN_ALL_CORE
Need for follow up after discharge/Risk factors for stroke/Stroke warning signs and symptoms/Signs and symptoms of stroke/Call 911 for stroke/Stroke education booklet/Stroke support groups for patients, families, and friends/Prescribed medications

## 2020-10-27 NOTE — PHYSICAL THERAPY INITIAL EVALUATION ADULT - ADDITIONAL COMMENTS
Pt reports living in a private home with her , daughter and 3 grandkids. Family available to assist if needed. Pt has 4 steps to enter with bilateral handrails and 7+5 stairs inside with 1 handrail. Pt independent prior to admission. Owns no DME. Pt does not work/drive.

## 2020-10-27 NOTE — DISCHARGE NOTE NURSING/CASE MANAGEMENT/SOCIAL WORK - PATIENT PORTAL LINK FT
You can access the FollowMyHealth Patient Portal offered by [facility] by registering at the following website: [email or web address]. By joining [facility]’s FollowMyHealth portal, you will also be able to view your health information using other applications (apps) compatible with our system.

## 2020-10-27 NOTE — PHYSICAL THERAPY INITIAL EVALUATION ADULT - PERTINENT HX OF CURRENT PROBLEM, REHAB EVAL
70F with a history of coronary artery disease, CABG, hypertension, diabetes, and hypothyroidism who initially presented to the hospital for a nuclear stress test due to dyspnea on exertion. During the stress test, the patient was noted to have elevated blood pressures(200/110) and difficulty with speech and slow response to question

## 2020-10-27 NOTE — DISCHARGE NOTE NURSING/CASE MANAGEMENT/SOCIAL WORK - HAS THE PATIENT USED TOBACCO IN THE PAST 30 DAYS?
Patient tolerated infusion well  Denies any discomfort  Pt is aware of all future appointments   Refused AVS  No

## 2020-10-27 NOTE — PROGRESS NOTE ADULT - ASSESSMENT
71 y/o female with medical history DM Type I, CAD s/p PCI CABGX4, Stage III CKD, CVA, Anemia, hypothyroidism, who presents to [facility]-ED for sudden onset slurred speech and chest pain during nuclear stress test.     Nuclear stress test: The left ventricle was normal LV size. There are small, mild defects in mid anterior, mid anteroseptal  walls that are predominantly reversible, suggestive of ischemia.There are medium sized, moderate defects in basal and mid inferior, basal and mid infero-lateral walls that are minimally reversible suggestive of infarction with  mild cathy-infarct ischemic *  Hypokinesis of basal inferior and inferolateral wall. Poststress LVEF  69%.  TTE:  1. Left ventricular ejection fraction, by visual estimation, is 60 to 65%. Moderate to severe aortic valve stenosis. The Dimensionless Index value is 0.29. Mild pulmonic valve regurgitation. Dilatation of the aortic root.    Plan   1) Cardiac  a) Dyspnea on exertion possible anginal equivalent   - patient is euvolemic on physical examination  - abnormal Nuclear stress test   - continue with current home regimen for blood pressure  - continue with ASA and statin    b) CAD s/p CABG (LIMA -> 1st diag/ SVG ->2nd diag/SVG-> OM1 and RPDA)   - dyspnea on exertion likely anginal equivalent with abnormal nuclear stress test    - as stated above plan for cath post neuro work up     c) HTN Urgency    - blood pressure uncontrolled   - continue with home blood pressure regimen   - BP better improved today, continue current regimen.   - If further control, can increase Imdur to 60mg PO qday.  - Coreg 12.5mg po q 12hrs discontinued due to possible hypotension with vasovagal syncope possibly secondary to mod to severe AS / Hydralazine 50mg po q 6hrs / Losartan 100 mg po q daily     d) Moderate to Severe AS, DI 0.29  - will need to further assess gradients with LHC/RHC   - once cleared from Neurology will proceed with above work up     2) Neurology   a) TIA ( w/ slurred speech), prior h/o CVA in 2004   - currently baseline mental status   - CT head negative for any acute intracranial pathology   - Neurology consult appreciated   - MRI /MRAs, w/ EEG   - post Neurology work up would arrange for LHC   - continue with ASA and Lipitor    Preliminary evaluation, please await official recommendations by Dr. Sahu

## 2020-10-27 NOTE — PHYSICAL THERAPY INITIAL EVALUATION ADULT - GENERAL OBSERVATIONS, REHAB EVAL
Pt received in bed + IV Loc, +Tele, breathing on RA in NAD, in 8/10 left lower back due to pt reports straining her back getting in/out of stretcher to bed post MRI, agreeable to PT evaluation

## 2020-10-27 NOTE — PROGRESS NOTE ADULT - ASSESSMENT
Impression:  Encephalopathy likely hypertensive related    Plan:    Await for mri and MRA's head/neck as ordered.  On baby ASA.  Continue per cardiology and medicine.  DVT prophylaxis recommended.  Will follow.

## 2020-10-27 NOTE — PROGRESS NOTE ADULT - SUBJECTIVE AND OBJECTIVE BOX
[place] [facility]                                                               Office: [address] [address]                                                              Telephone: [phone]. Fax:[phone]                                                                             PROGRESS NOTE  Reason for follow up: Chest Pain/Slurred Speech  Overnight: No new events.   Update: Patient states that she is frustrated since she is still awaiting her MRI, and feels a little lethargic today. Patient with no further chest pain or dyspnea.       Review of symptoms:   Cardiac:  No chest pain. No dyspnea. No palpitations.  Respiratory: No cough. No dyspnea  Gastrointestinal: No diarrhea. No abdominal pain. No bleeding.     Past medical history: No updates.   	  Vitals:  T(C): 37 (10-27-20 @ 05:41), Max: 37.4 (10-26-20 @ 22:10)  HR: 73 (10-27-20 @ 05:41) (72 - 74)  BP: 167/93 (10-27-20 @ 05:41) (121/66 - 175/95)  RR: 18 (10-27-20 @ 05:41) (18 - 18)  SpO2: 96% (10-27-20 @ 05:41) (94% - 96%)  Wt(kg): --  I&O's Summary    26 Oct 2020 07:01  -  27 Oct 2020 07:00  --------------------------------------------------------  IN: 0 mL / OUT: 400 mL / NET: -400 mL        PHYSICAL EXAM:  Appearance: Comfortable. No acute distress  HEENT:  Head and neck: Atraumatic. Normocephalic.  Normal oral mucosa, PERRL, Neck is supple. No JVD, No carotid bruit.   Neurologic: A & O x 3, no focal deficits. EOMI.  Lymphatic: No cervical lymphadenopathy  Cardiovascular: Normal S1 S2, + mid to late peaking systolic ejection murmur radiates  Respiratory: Lungs clear to auscultation  Gastrointestinal:  Soft, Non-tender, + BS  Lower Extremities: No edema  Psychiatry: Patient is calm. No agitation. Mood & affect appropriate  Skin: No rashes/ ecchymoses/cyanosis/ulcers visualized on the face, hands or feet.      CURRENT MEDICATIONS:  amLODIPine   Tablet 10 milliGRAM(s) Oral daily  hydrALAZINE 50 milliGRAM(s) Oral every 8 hours  isosorbide   mononitrate ER Tablet (IMDUR) 30 milliGRAM(s) Oral daily  losartan 100 milliGRAM(s) Oral daily    gabapentin  senna  atorvastatin  dextrose 50% Injectable  dextrose 50% Injectable  dextrose 50% Injectable  insulin glargine Injectable (LANTUS)  insulin lispro (ADMELOG) corrective regimen sliding scale  insulin lispro (ADMELOG) corrective regimen sliding scale  insulin lispro Injectable (ADMELOG)  levothyroxine  aspirin  chewable  dextrose 5%.  enoxaparin Injectable      DIAGNOSTIC TESTING:  [ ] Echocardiogram:   < from: TTE Echo Complete w/o Contrast w/ Doppler (10.23.20 @ 15:38) >  PHYSICIAN INTERPRETATION:  LeftVentricle: The left ventricular internal cavity size is normal.  Global LV systolic function was normal. Left ventricular ejection fraction, by visual estimation, is 60 to 65%. Spectral Doppler shows pseudonormal pattern of left ventricular myocardial filling (Grade II diastolic dysfunction). Elevated mean left atrial pressure.  Right Ventricle: Normal right ventricular size and function.  Left Atrium: Moderately enlarged left atrium.  Right Atrium: Normal right atrial size.  Pericardium: There is no evidence of pericardial effusion.  Mitral Valve: Structurally normal mitral valve, with normal leaflet excursion. Mild thickening and calcification of the anterior and posterior mitral valve leaflets. There is moderate mitral annular calcification. Trace mitral valve regurgitation is seen.  Tricuspid Valve: Structurally normal tricuspid valve, with normal leaflet excursion. Trivial tricuspid regurgitation is visualized. Adequate TR velocity was not obtained to accurately assess RVSP.  Aortic Valve: The aortic valve is trileaflet. Moderate to severe aortic stenosis is present. Trivial aortic valve regurgitation is seen. The Dimesionless Index value is 0.29.  Pulmonic Valve: Mild pulmonic valve regurgitation.  Aorta: There is dilatation of the aortic root.  Venous: The inferior vena cava is normal. The inferior vena cava was normal sized, with respiratory size variation greater than 50%.      Summary:   1. Left ventricular ejection fraction, by visual estimation, is 60 to 65%.   2. Normal global left ventricular systolic function.   3. Spectral Doppler shows pseudonormal pattern of left ventricular myocardial filling (Grade II diastolic dysfunction). Elevated mean left atrial pressure.   4. Normal left ventricular internal cavity size.   5. There is mild concentric left ventricular hypertrophy.   6. Normal right ventricular size and function.   7. Moderately enlarged left atrium.   8. Normal right atrial size.   9. Moderate mitral annular calcification.  10. Mild thickening and calcification of the anterior and posterior mitral valve leaflets.  11. Trace mitral valve regurgitation.  12. Moderate to severe aortic valve stenosis. The Dimesionless Index value is 0.29.  13. Mild pulmonic valve regurgitation.  14. Dilatation of the aortic root.  15. There is no evidence of pericardial effusion.    MD Shellie Electronically signed on 10/24/2020 at 11:07:01 AM    < end of copied text >    [ ]  Catheterization:  < from: Cardiac Cath Lab - Adult (11.06.18 @ 16:04) >  VENTRICLES: No LV gram was performed; however, a recent echocardiogram  demonstrated an EF of 55 %.  CORONARY VESSELS: The coronary circulation is right dominant.  LM:   --  Distal left main: There was a 50 % stenosis.  LAD:   --Ostial LAD: There was a 80 % stenosis.  --  Proximal LAD: There was a 50 % stenosis in-stent.  --  Mid LAD: There was a 70 % stenosis in-stent.  CX:   --  Proximal circumflex: There was a 70 % stenosis.  RCA:   --  Proximal RCA: There was a 80 % stenosis.  --  Distal RCA: There was a 50 % stenosis.  GRAFTS:   --  Graft to the distal LAD: The graft was a sequential LIMA.  Graft angiography showed no evidence of disease.  --  Graft to the 1st diagonal: The graft was a LIMA. Graft angiography  showed no evidence of disease.  --  Graft to the 2nd diagonal: The graft was a saphenous vein graft. Graft  angiography showed no evidence of disease.  --  Graft to the 1st obtuse marginal: The graft was a saphenous vein graft.  Graft angiography showed noevidence of disease.  --  Graft to the RPDA: The graft was a sequential vein graft. Graft  angiography showed no evidence of disease.  COMPLICATIONS: No complications occurred during the cath lab visit.  DIAGNOSTIC IMPRESSIONS: Patent Grafts.  No newdisease.  DIAGNOSTIC RECOMMENDATIONS: Assess for non cardiac causes of chest pain.  Patient has hiatal hernia which could be the etiology. Increase PPI to bid.  Prepared and signed by  Riccardo Erazo MD  Signed 11/06/2018 17:53:43    < end of copied text >    [ ] Stress Test:    < from: Nuclear Stress Test-Pharmacologic (10.23.20 @ 09:34) >  NUCLEAR FINDINGS:  Review of raw data shows: Breast attenuation artifact.,  Diaphragmatic artifact.  The left ventricle was normal LV size. There are small,  mild defects in mid anterior, mid anteroseptalwalls that  are predominantly reversible, suggestive of ischemia.There  are medium sized, moderate defects in basal and mid  inferior, basal and mid infero-lateral walls that are  minimally reversible suggestive of infarction with  mild  cathy-infarctischemi  ------------------------------------------------------------------------      GATED ANALYSIS:   Hypokinesis of basal inferior and inferolateral wall.  Poststress LVEF  69%.  ------------------------------------------------------------------------    IMPRESSIONS:Abnormal Study  * Unable to walk due to covid pandemic precautions  * Chest Pain: No chest pain with administration of  Regadenoson.  * Symptom: No Symptom.  * HR Response: Appropriate.  * BP Response: Appropriate.  * Heart Rhythm: Normal Sinus Rhythm.  * ECG Abnormalities: There were no diagnostic changes.  * Arrhythmia: 1 VPDs occurred.  * As per Stress  lab NP notes: "was Called by nuclear tech  to evaluate patient for elevated heart rate during last  minute of stress pictures. Patients speech was delayed,  NST on monitor. Aminophylline 75mg iv x 1 given, patient  hypertensive hydralzine 10mg IV x1 given. Rapid response  called and patient brought to ED."  * Review of raw data shows: Breast attenuation artifact.,  Diaphragmatic artifact.  * The left ventricle was normal LV size. There are small,  mild defects in mid anterior, mid anteroseptal  walls that  are predominantly reversible, suggestive of ischemia.There  are medium sized, moderate defects in basal and mid  inferior, basal and mid infero-lateral walls that are  minimally reversible suggestive of infarction with  mild  cathy-infarct ischemi  *  Hypokinesis of basal inferior and inferolateral wall.  Poststress LVEF  69%.    ------------------------------------------------------------------------      ------------------------------------------------------------------------    Confirmed on  10/23/2020 - 12:04:23 at [facility] by  Devin Limon MD   Cardiology Fellow: Deedee Kumar  ------------------------------------------------------------------------    < end of copied text >    OTHER: 	      LABS:	 	        10-27    140  |  105  |  18.0  ----------------------------<  101<H>  3.9   |  23.0  |  1.01    Ca    8.7      27 Oct 2020 06:48      proBNP: Serum Pro-Brain Natriuretic Peptide: 1313 pg/mL (10-23 @ 11:58)    Lipid Profile: Date: 10-27 @ 06:48  Total cholesterol 167; Direct LDL: --; HDL: 66; Triglycerides:61    HgA1c:   TSH:       TELEMETRY: Reviewed  SR, ST

## 2020-10-28 LAB
GLUCOSE BLDC GLUCOMTR-MCNC: 129 MG/DL — HIGH (ref 70–99)
GLUCOSE BLDC GLUCOMTR-MCNC: 138 MG/DL — HIGH (ref 70–99)
GLUCOSE BLDC GLUCOMTR-MCNC: 240 MG/DL — HIGH (ref 70–99)
GLUCOSE BLDC GLUCOMTR-MCNC: 320 MG/DL — HIGH (ref 70–99)

## 2020-10-28 PROCEDURE — 99232 SBSQ HOSP IP/OBS MODERATE 35: CPT

## 2020-10-28 RX ADMIN — LOSARTAN POTASSIUM 100 MILLIGRAM(S): 100 TABLET, FILM COATED ORAL at 05:51

## 2020-10-28 RX ADMIN — Medication 137 MICROGRAM(S): at 05:51

## 2020-10-28 RX ADMIN — ISOSORBIDE MONONITRATE 30 MILLIGRAM(S): 60 TABLET, EXTENDED RELEASE ORAL at 11:29

## 2020-10-28 RX ADMIN — Medication 8: at 08:59

## 2020-10-28 RX ADMIN — GABAPENTIN 100 MILLIGRAM(S): 400 CAPSULE ORAL at 13:53

## 2020-10-28 RX ADMIN — Medication 8 UNIT(S): at 17:43

## 2020-10-28 RX ADMIN — Medication 4: at 12:30

## 2020-10-28 RX ADMIN — Medication 50 MILLIGRAM(S): at 21:48

## 2020-10-28 RX ADMIN — Medication 50 MILLIGRAM(S): at 05:51

## 2020-10-28 RX ADMIN — Medication 8 UNIT(S): at 08:57

## 2020-10-28 RX ADMIN — AMLODIPINE BESYLATE 10 MILLIGRAM(S): 2.5 TABLET ORAL at 05:51

## 2020-10-28 RX ADMIN — GABAPENTIN 100 MILLIGRAM(S): 400 CAPSULE ORAL at 21:48

## 2020-10-28 RX ADMIN — GABAPENTIN 100 MILLIGRAM(S): 400 CAPSULE ORAL at 05:51

## 2020-10-28 RX ADMIN — ENOXAPARIN SODIUM 40 MILLIGRAM(S): 100 INJECTION SUBCUTANEOUS at 22:03

## 2020-10-28 RX ADMIN — ATORVASTATIN CALCIUM 40 MILLIGRAM(S): 80 TABLET, FILM COATED ORAL at 21:48

## 2020-10-28 RX ADMIN — Medication 81 MILLIGRAM(S): at 11:29

## 2020-10-28 RX ADMIN — INSULIN GLARGINE 15 UNIT(S): 100 INJECTION, SOLUTION SUBCUTANEOUS at 21:49

## 2020-10-28 RX ADMIN — Medication 50 MILLIGRAM(S): at 13:53

## 2020-10-28 RX ADMIN — SENNA PLUS 1 TABLET(S): 8.6 TABLET ORAL at 21:48

## 2020-10-28 RX ADMIN — Medication 8 UNIT(S): at 12:32

## 2020-10-28 NOTE — PROGRESS NOTE ADULT - SUBJECTIVE AND OBJECTIVE BOX
[facility] [facility]  [address] [address]    REASON FOR VISIT:  Follow up on abnormal stress test  UPDATE:  Denies any cp sob  TELEMETRY MONITORING:  Nsr    10-27    140  |  105  |  18.0  ----------------------------<  101<H>  3.9   |  23.0  |  1.01    Ca    8.7      27 Oct 2020 06:48    MEDICATIONS  (STANDING):  amLODIPine   Tablet 10 milliGRAM(s) Oral daily  aspirin  chewable 81 milliGRAM(s) Oral daily  atorvastatin 40 milliGRAM(s) Oral at bedtime  dextrose 5%. 1000 milliLiter(s) (50 mL/Hr) IV Continuous <Continuous>  dextrose 50% Injectable 12.5 Gram(s) IV Push once  dextrose 50% Injectable 25 Gram(s) IV Push once  dextrose 50% Injectable 25 Gram(s) IV Push once  enoxaparin Injectable 40 milliGRAM(s) SubCutaneous daily  gabapentin 100 milliGRAM(s) Oral three times a day  hydrALAZINE 50 milliGRAM(s) Oral every 8 hours  insulin glargine Injectable (LANTUS) 15 Unit(s) SubCutaneous at bedtime  insulin lispro (ADMELOG) corrective regimen sliding scale   SubCutaneous three times a day before meals  insulin lispro (ADMELOG) corrective regimen sliding scale   SubCutaneous at bedtime  insulin lispro Injectable (ADMELOG) 8 Unit(s) SubCutaneous three times a day before meals  isosorbide   mononitrate ER Tablet (IMDUR) 30 milliGRAM(s) Oral daily  levothyroxine 137 MICROGram(s) Oral daily  losartan 100 milliGRAM(s) Oral daily  senna 1 Tablet(s) Oral at bedtime    ROS:  No fever chills  Cardiac  No cp sob or palp  Resp  no cough no mucus production  Gi  no abd pain no melana  Ext No calf tenderness, no edema    Vital Signs Last 24 Hrs  T(C): 37.1 (28 Oct 2020 08:31), Max: 37.1 (27 Oct 2020 15:37)  T(F): 98.7 (28 Oct 2020 08:31), Max: 98.8 (27 Oct 2020 21:58)  HR: 85 (28 Oct 2020 08:31) (76 - 95)  BP: 144/86 (28 Oct 2020 08:31) (106/62 - 167/80)  BP(mean): --  RR: 14 (28 Oct 2020 08:31) (14 - 18)  SpO2: 95% (28 Oct 2020 08:31) (95% - 96%)  T(C): 37.1 (10-28-20 @ 08:31), Max: 37.1 (10-27-20 @ 15:37)  HR: 85 (10-28-20 @ 08:31) (76 - 95)  BP: 144/86 (10-28-20 @ 08:31) (106/62 - 167/80)  RR: 14 (10-28-20 @ 08:31) (14 - 18)  SpO2: 95% (10-28-20 @ 08:31) (95% - 96%)    HEENT Head atraumatic eomi, oral mucosa moist  CV S1&S2     regular 2/6 kenney lsb  RESP  clear  GI  Soft active bowel sounds non tender  EXT  No clubbing/Cyanosis /Edema  NEURO  Alert oriented  No gross motor or sensory deficits    < from: TTE Echo Complete w/o Contrast w/ Doppler (10.23.20 @ 15:38) >  Summary:   1. Left ventricular ejection fraction, by visual estimation, is 60 to 65%.   2. Normal global left ventricular systolic function.   3. Spectral Doppler shows pseudonormal pattern of left ventricular myocardial filling (Grade II diastolic dysfunction). Elevated mean left atrial pressure.   4. Normal left ventricular internal cavity size.   5. There is mild concentric left ventricular hypertrophy.   6. Normal right ventricular size and function.   7. Moderately enlarged left atrium.   8. Normal right atrial size.   9. Moderate mitral annular calcification.  10. Mild thickening and calcification of the anterior and posterior mitral valve leaflets.  11. Trace mitral valve regurgitation.  12. Moderate to severe aortic valve stenosis. The Dimesionless Index value is 0.29.  13. Mild pulmonic valve regurgitation.  14. Dilatation of the aortic root.  15. There is no evidence of pericardial effusion.    < from: Cardiac Cath Lab - Adult (11.06.18 @ 16:04) >  VENTRICLES: No LV gram was performed; however, a recent echocardiogram  demonstrated an EF of 55 %.  CORONARY VESSELS: The coronary circulation is right dominant.  LM:   --  Distal left main: There was a 50 % stenosis.  LAD:   --Ostial LAD: There was a 80 % stenosis.sm  --  Proximal LAD: There was a 50 % stenosis in-stent.  --  Mid LAD: There was a 70 % stenosis in-stent.  CX:   --  Proximal circumflex: There was a 70 % stenosis.  RCA:   --  Proximal RCA: There was a 80 % stenosis.  --  Distal RCA: There was a 50 % stenosis.  GRAFTS:   --  Graft to the distal LAD: The graft was a sequential LIMA.  Graft angiography showed no evidence of disease.  --  Graft to the 1st diagonal: The graft was a LIMA. Graft angiography  showed no evidence of disease.  --  Graft to the 2nd diagonal: The graft was a saphenous vein graft. Graft  angiography showed no evidence of disease.  --  Graft to the 1st obtuse marginal: The graft was a saphenous vein graft.  Graft angiography showed noevidence of disease.  --  Graft to the RPDA: The graft was a sequential vein graft. Graft  angiography showed no evidence of disease.  COMPLICATIONS: No complications occurred during the cath lab visit.  DIAGNOSTIC IMPRESSIONS: Patent Grafts.  No newdisease.  DIAGNOSTIC RECOMMENDATIONS: Assess for non cardiac causes of chest pain.  Patient has hiatal hernia which could be the etiology. Increase PPI to bid.  Prepared and signed by  Riccardo Erazo MD  Signed 11/06/2018 17:53:43    < end of copied text >    [ ] Stress Test:    < from: Nuclear Stress Test-Pharmacologic (10.23.20 @ 09:34) >  NUCLEAR FINDINGS:  Review of raw data shows: Breast attenuation artifact.,  Diaphragmatic artifact.  The left ventricle was normal LV size. There are small,  mild defects in mid anterior, mid anteroseptalwalls that  are predominantly reversible, suggestive of ischemia.There  are medium sized, moderate defects in basal and mid  inferior, basal and mid infero-lateral walls that are  minimally reversible suggestive of infarction with  mild  cathy-infarctischemi  ------------------------------------------------------------------------    < from: MR Angio Head No Cont (10.27.20 @ 13:43) >    1. Chronic left MCA occlusion in the proximal left M1 segment.  2. Fairly extensive atherosclerotic changes the in the vertebrobasilar system and both posterior cerebral arteries. Atherosclerotic changes also noted in the right middle cerebral and both anterior cerebral arteries.    < end of copied text >  < from: MR Head No Cont (10.27.20 @ 13:31) >    IMPRESSION:  1)  Old left MCA infarct. Chronic ischemic changes scattered in the white matter of both hemispheres as well as within the posterior fossa..  2)  no diffusion restriction or MR evidence of acute ischemia. No space-occupying lesion noted..

## 2020-10-28 NOTE — PROGRESS NOTE ADULT - ASSESSMENT
71 y/o female with medical history DM Type I, CAD s/p PCI CABGX4, Stage III CKD, CVA, Anemia, hypothyroidism, who presents to [facility]ED for sudden onset slurred speech and chest pain during nuclear stress test.  MRI with old left MCA infrct with ischemic changes, MRA neck with extensive atherosclerotic changes Echo EF 65%  grade 2 diastolic dysfunction,  Moderate to severe aortic valve stenosis. The Dimesionless Index value is 0.29.  NST .small mild defects in the mid ant ant/septsl walls suggestive of ischemia    HYPERTENSIVE ENCEPHELOPATHY  Tight b/p control  discussed importance of taking meds on a regular basis  Low na diet discussed    Hx CAD s/p CABG for LEFT heart cath to evaluate grafts and gradients for Aortic stenosis    DM  sliding scale coverage    HX Ckd  bun creat normal at this time

## 2020-10-28 NOTE — PROGRESS NOTE ADULT - SUBJECTIVE AND OBJECTIVE BOX
INTERVAL HISTORY:  stable, no interval changes      VITAL SIGNS:  Vital Signs Last 24 Hrs  T(C): 37.1 (28 Oct 2020 08:31), Max: 37.1 (27 Oct 2020 15:37)  T(F): 98.7 (28 Oct 2020 08:31), Max: 98.8 (27 Oct 2020 21:58)  HR: 85 (28 Oct 2020 08:31) (76 - 95)  BP: 144/86 (28 Oct 2020 08:31) (111/60 - 167/80)  BP(mean): --  RR: 14 (28 Oct 2020 08:31) (14 - 18)  SpO2: 95% (28 Oct 2020 08:31) (95% - 96%)    PHYSICAL EXAMINATION:    Mentation:  awake and cooperative  Language/Speech:  CN:  Visual Fields:  Motor:  Sensory:  DTR:  Babinski:      MEDS:  MEDICATIONS  (STANDING):  amLODIPine   Tablet 10 milliGRAM(s) Oral daily  aspirin  chewable 81 milliGRAM(s) Oral daily  atorvastatin 40 milliGRAM(s) Oral at bedtime  dextrose 5%. 1000 milliLiter(s) (50 mL/Hr) IV Continuous <Continuous>  dextrose 50% Injectable 12.5 Gram(s) IV Push once  dextrose 50% Injectable 25 Gram(s) IV Push once  dextrose 50% Injectable 25 Gram(s) IV Push once  enoxaparin Injectable 40 milliGRAM(s) SubCutaneous daily  gabapentin 100 milliGRAM(s) Oral three times a day  hydrALAZINE 50 milliGRAM(s) Oral every 8 hours  insulin glargine Injectable (LANTUS) 15 Unit(s) SubCutaneous at bedtime  insulin lispro (ADMELOG) corrective regimen sliding scale   SubCutaneous three times a day before meals  insulin lispro (ADMELOG) corrective regimen sliding scale   SubCutaneous at bedtime  insulin lispro Injectable (ADMELOG) 8 Unit(s) SubCutaneous three times a day before meals  isosorbide   mononitrate ER Tablet (IMDUR) 30 milliGRAM(s) Oral daily  levothyroxine 137 MICROGram(s) Oral daily  losartan 100 milliGRAM(s) Oral daily  senna 1 Tablet(s) Oral at bedtime    MEDICATIONS  (PRN):  acetaminophen   Tablet .. 650 milliGRAM(s) Oral every 6 hours PRN Temp greater or equal to 38C (100.4F), Mild Pain (1 - 3)  dextrose 40% Gel 15 Gram(s) Oral once PRN Blood Glucose LESS THAN 70 milliGRAM(s)/deciliter  glucagon  Injectable 1 milliGRAM(s) IntraMuscular once PRN Glucose LESS THAN 70 milligrams/deciliter  ondansetron Injectable 4 milliGRAM(s) IV Push every 6 hours PRN Nausea and/or Vomiting      LABS:      10-27    140  |  105  |  18.0  ----------------------------<  101<H>  3.9   |  23.0  |  1.01    Ca    8.7      27 Oct 2020 06:48            RADIOLOGY & ADDITIONAL STUDIES:    MRI-brain 0- old left CVA  MRA neck- negaitve  MRA brain-  Left M1 occlusion- old  Moderate posterior intracranial cerebrovascular disease    IMPRESSION & PLAN:    Syncope/TIa with stress test.  h/o Aortic valve stenosis  Moderate posterior [id number] disease  Left M1 occlusion    Patient has significant compromise of collateral flow circulation in the CNS  Need to avoid hypotension or decreased cardiac output to avoid syncope or stroke  It is possible the that severe hypertension of admission was compensatory for  decreased CBF during the stress tests.  Aside from optimization of BP and cardiac output, there is nothing further to add  Please recontact as needdd

## 2020-10-28 NOTE — PROGRESS NOTE ADULT - SUBJECTIVE AND OBJECTIVE BOX
SERGIO PHILLIP  ----------------------------------------  The patient was seen and evaluated for hypertensive encephalopathy. Offers no complaints except for dyspnea with exertion. Denied chest pain, palpitations, or dyspnea at rest.    Vital Signs Last 24 Hrs  T(C): 37.1 (28 Oct 2020 08:31), Max: 37.1 (27 Oct 2020 15:37)  T(F): 98.7 (28 Oct 2020 08:31), Max: 98.8 (27 Oct 2020 21:58)  HR: 85 (28 Oct 2020 08:31) (76 - 95)  BP: 144/86 (28 Oct 2020 08:31) (106/62 - 167/80)  BP(mean): --  RR: 14 (28 Oct 2020 08:31) (14 - 18)  SpO2: 95% (28 Oct 2020 08:31) (95% - 96%)    CAPILLARY BLOOD GLUCOSE  POCT Blood Glucose.: 320 mg/dL (28 Oct 2020 08:24)  POCT Blood Glucose.: 289 mg/dL (27 Oct 2020 22:09)  POCT Blood Glucose.: 172 mg/dL (27 Oct 2020 18:37)  POCT Blood Glucose.: 78 mg/dL (27 Oct 2020 17:31)  POCT Blood Glucose.: 73 mg/dL (27 Oct 2020 16:54)  POCT Blood Glucose.: 128 mg/dL (27 Oct 2020 12:08)    PHYSICAL EXAMINATION:  ----------------------------------------  General appearance: No acute distress, Awake, Alert  HEENT: Normocephalic, Atraumatic, Conjunctiva clear, EOMI  Neck: Supple, No JVD, No tenderness  Lungs: Breath sound equal bilaterally, No wheezes, No rales  Cardiovascular: S1S2, Regular rhythm  Abdomen: Soft, Nontender, Nondistended, No guarding/rebound, Positive bowel sounds  Extremities: No clubbing, No cyanosis, No edema, No calf tenderness  Neuro: Strength equal bilaterally, No tremors  Psychiatric: Appropriate mood, Normal affect    LABORATORY STUDIES:  ----------------------------------------  10-27    140  |  105  |  18.0  ----------------------------<  101<H>  3.9   |  23.0  |  1.01    Ca    8.7      27 Oct 2020 06:48    MEDICATIONS  (STANDING):  amLODIPine   Tablet 10 milliGRAM(s) Oral daily  aspirin  chewable 81 milliGRAM(s) Oral daily  atorvastatin 40 milliGRAM(s) Oral at bedtime  dextrose 5%. 1000 milliLiter(s) (50 mL/Hr) IV Continuous <Continuous>  dextrose 50% Injectable 12.5 Gram(s) IV Push once  dextrose 50% Injectable 25 Gram(s) IV Push once  dextrose 50% Injectable 25 Gram(s) IV Push once  enoxaparin Injectable 40 milliGRAM(s) SubCutaneous daily  gabapentin 100 milliGRAM(s) Oral three times a day  hydrALAZINE 50 milliGRAM(s) Oral every 8 hours  insulin glargine Injectable (LANTUS) 15 Unit(s) SubCutaneous at bedtime  insulin lispro (ADMELOG) corrective regimen sliding scale   SubCutaneous three times a day before meals  insulin lispro (ADMELOG) corrective regimen sliding scale   SubCutaneous at bedtime  insulin lispro Injectable (ADMELOG) 8 Unit(s) SubCutaneous three times a day before meals  isosorbide   mononitrate ER Tablet (IMDUR) 30 milliGRAM(s) Oral daily  levothyroxine 137 MICROGram(s) Oral daily  losartan 100 milliGRAM(s) Oral daily  senna 1 Tablet(s) Oral at bedtime    MEDICATIONS  (PRN):  acetaminophen   Tablet .. 650 milliGRAM(s) Oral every 6 hours PRN Temp greater or equal to 38C (100.4F), Mild Pain (1 - 3)  dextrose 40% Gel 15 Gram(s) Oral once PRN Blood Glucose LESS THAN 70 milliGRAM(s)/deciliter  glucagon  Injectable 1 milliGRAM(s) IntraMuscular once PRN Glucose LESS THAN 70 milligrams/deciliter  ondansetron Injectable 4 milliGRAM(s) IV Push every 6 hours PRN Nausea and/or Vomiting      ASSESSMENT / PLAN:  ----------------------------------------  70F with a history of coronary artery disease, CABG, hypertension, diabetes, and hypothyroidism who initially presented to the hospital for a nuclear stress test due to dyspnea on exertion. During the stress test, the patient was noted to have elevated blood pressures(200/110) and difficulty with speech and slow response to questions. A rapid response was activated and the patient transferred to the emergency department for evaluation.    Hypertensive emergency - Blood pressure improved. On amlodipine, hydralazine, isosorbide, and losartan. Carvedilol previously discontinued. On telemetry.    Slurred speech and confusion - CT of the head was without acute intracranial pathology. MRI noted a prior stroke in the left basal ganglia and left thalamus but no acute infarcts.    Chest pain - On aspirin and atorvastatin. Nuclear stress test results noted small mild reversible defects in mid anterior and mid anteroseptal walls as well as medium sized moderate defects in basal, mid inferior, basal and mid infero-lateral walls. Planned for eventual cardiac catheterization.     Diabetes - Insulin coverage, close monitoring of blood glucose levels. Neuropathy improved with gabapentin.    Hypothyroidism - On levothyroxine.

## 2020-10-28 NOTE — STUDENT SIGN OFF DOCUMENT - DOCUMENTS STUDENTS ARE SIGNED OFF ON
Insulin administered when breakfast tray arrived to the unit. Cosign: Diandra Kern [facility] Nursing instructor

## 2020-10-28 NOTE — STUDENT SIGN OFF DOCUMENT - DOCUMENTS STUDENTS ARE SIGNED OFF ON
Torri [facility] Nursing Student RP VS/Vital Signs entered in error. VS entered by primary Nurse Lindy.

## 2020-10-28 NOTE — STUDENT SIGN OFF DOCUMENT - DOCUMENTS STUDENTS ARE SIGNED OFF ON
Torri [facility] Nursing Student VS KIMBERLY. Primary RN Lindy aware of VS./Vital Signs Entered in error.

## 2020-10-29 LAB
GLUCOSE BLDC GLUCOMTR-MCNC: 142 MG/DL — HIGH (ref 70–99)
GLUCOSE BLDC GLUCOMTR-MCNC: 163 MG/DL — HIGH (ref 70–99)
GLUCOSE BLDC GLUCOMTR-MCNC: 193 MG/DL — HIGH (ref 70–99)
GLUCOSE BLDC GLUCOMTR-MCNC: 220 MG/DL — HIGH (ref 70–99)

## 2020-10-29 PROCEDURE — 99152 MOD SED SAME PHYS/QHP 5/>YRS: CPT

## 2020-10-29 PROCEDURE — 99232 SBSQ HOSP IP/OBS MODERATE 35: CPT

## 2020-10-29 PROCEDURE — 93461 R&L HRT ART/VENTRICLE ANGIO: CPT | Mod: 26

## 2020-10-29 RX ORDER — HYDRALAZINE HCL 50 MG
5 TABLET ORAL ONCE
Refills: 0 | Status: COMPLETED | OUTPATIENT
Start: 2020-10-29 | End: 2020-10-29

## 2020-10-29 RX ORDER — HYDRALAZINE HCL 50 MG
50 TABLET ORAL THREE TIMES A DAY
Refills: 0 | Status: DISCONTINUED | OUTPATIENT
Start: 2020-10-29 | End: 2020-10-29

## 2020-10-29 RX ORDER — HYDRALAZINE HCL 50 MG
75 TABLET ORAL EVERY 8 HOURS
Refills: 0 | Status: DISCONTINUED | OUTPATIENT
Start: 2020-10-29 | End: 2020-10-29

## 2020-10-29 RX ADMIN — SENNA PLUS 1 TABLET(S): 8.6 TABLET ORAL at 21:33

## 2020-10-29 RX ADMIN — INSULIN GLARGINE 15 UNIT(S): 100 INJECTION, SOLUTION SUBCUTANEOUS at 21:34

## 2020-10-29 RX ADMIN — ISOSORBIDE MONONITRATE 30 MILLIGRAM(S): 60 TABLET, EXTENDED RELEASE ORAL at 08:59

## 2020-10-29 RX ADMIN — Medication 5 MILLIGRAM(S): at 19:03

## 2020-10-29 RX ADMIN — Medication 4: at 08:56

## 2020-10-29 RX ADMIN — GABAPENTIN 100 MILLIGRAM(S): 400 CAPSULE ORAL at 21:33

## 2020-10-29 RX ADMIN — Medication 50 MILLIGRAM(S): at 21:33

## 2020-10-29 RX ADMIN — Medication 2: at 12:37

## 2020-10-29 RX ADMIN — ENOXAPARIN SODIUM 40 MILLIGRAM(S): 100 INJECTION SUBCUTANEOUS at 22:44

## 2020-10-29 RX ADMIN — Medication 50 MILLIGRAM(S): at 05:30

## 2020-10-29 RX ADMIN — GABAPENTIN 100 MILLIGRAM(S): 400 CAPSULE ORAL at 13:38

## 2020-10-29 RX ADMIN — ATORVASTATIN CALCIUM 40 MILLIGRAM(S): 80 TABLET, FILM COATED ORAL at 21:33

## 2020-10-29 RX ADMIN — Medication 81 MILLIGRAM(S): at 08:58

## 2020-10-29 RX ADMIN — Medication 50 MILLIGRAM(S): at 16:25

## 2020-10-29 RX ADMIN — LOSARTAN POTASSIUM 100 MILLIGRAM(S): 100 TABLET, FILM COATED ORAL at 05:30

## 2020-10-29 RX ADMIN — Medication 137 MICROGRAM(S): at 05:30

## 2020-10-29 RX ADMIN — AMLODIPINE BESYLATE 10 MILLIGRAM(S): 2.5 TABLET ORAL at 05:30

## 2020-10-29 RX ADMIN — GABAPENTIN 100 MILLIGRAM(S): 400 CAPSULE ORAL at 05:31

## 2020-10-29 NOTE — PROGRESS NOTE ADULT - ASSESSMENT
71 y/o female with medical history DM Type I, CAD s/p PCI CABGX4, Stage III CKD, CVA, Anemia, hypothyroidism, who presents to [facility] for sudden onset slurred speech and chest pain during nuclear stress test.     Nuclear stress test: The left ventricle was normal LV size. There are small, mild defects in mid anterior, mid anteroseptal  walls that are predominantly reversible, suggestive of ischemia.There are medium sized, moderate defects in basal and mid inferior, basal and mid infero-lateral walls that are minimally reversible suggestive of infarction with  mild cathy-infarct ischemic *  Hypokinesis of basal inferior and inferolateral wall. Poststress LVEF  69%.  TTE:  1. Left ventricular ejection fraction, by visual estimation, is 60 to 65%. Moderate to severe aortic valve stenosis. The Dimensionless Index value is 0.29. Mild pulmonic valve regurgitation. Dilatation of the aortic root.    Plan   1) Cardiac  a) Dyspnea on exertion possible anginal equivalent   - patient is euvolemic on physical examination  - abnormal Nuclear stress test   - continue with current home regimen for blood pressure  - continue with ASA and statin    b) CAD s/p CABG (LIMA -> 1st diag/ SVG ->2nd diag/SVG-> OM1 and RPDA)   - dyspnea on exertion likely anginal equivalent with abnormal nuclear stress test    - LHC/RHC today to assess coronary anatomy      c) HTN Urgency    - blood pressure was controlled until @ 3:53 pm was elevated recommend repeat BP and if found to be persistent consider increasing Imdur to 60mg po q daily   - continue with home blood pressure regimen   - BP better improved today, continue current regimen.   - If further control, can increase Imdur to 60mg PO qday.  - Coreg 12.5mg po q 12hrs discontinued due to possible hypotension with vasovagal syncope possibly secondary to mod to severe AS / Hydralazine 50mg po q 6hrs / Losartan 100 mg po q daily     d) Moderate to Severe AS, DI 0.29  - will need to further assess gradients with LHC/RHC    2) Neurology   a) TIA ( w/ slurred speech), prior h/o CVA in 2004 2/2 hypertensive encephalopathy   - currently baseline mental status   - MRI shows old L MCA infarct and no acute ischemic changes   - No further work up from Neurology's perspective   - continue with ASA and Lipitor    3) DVT ppx: Hep SQ    Anxiety    CVA (cerebral vascular accident)    Depression    DM (diabetes mellitus)    HLD (hyperlipidemia)    HTN (hypertension)    Panic disorder

## 2020-10-29 NOTE — PROGRESS NOTE ADULT - SUBJECTIVE AND OBJECTIVE BOX
SERGIO PHILLIP  ----------------------------------------  The patient was seen and evaluated for hypertensive encephalopathy. Comfortable. Offers no complaints.    Vital Signs Last 24 Hrs  T(C): 36.6 (29 Oct 2020 09:46), Max: 36.8 (28 Oct 2020 21:47)  T(F): 97.9 (29 Oct 2020 09:46), Max: 98.2 (28 Oct 2020 21:47)  HR: 72 (29 Oct 2020 13:39) (70 - 79)  BP: 129/70 (29 Oct 2020 13:39) (122/70 - 164/81)  BP(mean): 99 (28 Oct 2020 17:38) (99 - 99)  RR: 17 (29 Oct 2020 09:46) (17 - 19)  SpO2: 98% (29 Oct 2020 09:46) (92% - 99%)    CAPILLARY BLOOD GLUCOSE  POCT Blood Glucose.: 163 mg/dL (29 Oct 2020 12:27)  POCT Blood Glucose.: 220 mg/dL (29 Oct 2020 08:52)  POCT Blood Glucose.: 138 mg/dL (28 Oct 2020 21:39)  POCT Blood Glucose.: 129 mg/dL (28 Oct 2020 17:07)    PHYSICAL EXAMINATION:  ----------------------------------------  General appearance: No acute distress, Awake, Alert  HEENT: Normocephalic, Atraumatic, Conjunctiva clear, EOMI  Neck: Supple, No JVD, No tenderness  Lungs: Breath sound equal bilaterally, No wheezes, No rales  Cardiovascular: S1S2, Regular rhythm  Abdomen: Soft, Nontender, Nondistended, No guarding/rebound, Positive bowel sounds  Extremities: No clubbing, No cyanosis, No edema, No calf tenderness  Neuro: Strength equal bilaterally, No tremors  Psychiatric: Appropriate mood, Normal affect    MEDICATIONS  (STANDING):  amLODIPine   Tablet 10 milliGRAM(s) Oral daily  aspirin  chewable 81 milliGRAM(s) Oral daily  atorvastatin 40 milliGRAM(s) Oral at bedtime  dextrose 5%. 1000 milliLiter(s) (50 mL/Hr) IV Continuous <Continuous>  dextrose 50% Injectable 12.5 Gram(s) IV Push once  dextrose 50% Injectable 25 Gram(s) IV Push once  dextrose 50% Injectable 25 Gram(s) IV Push once  enoxaparin Injectable 40 milliGRAM(s) SubCutaneous daily  gabapentin 100 milliGRAM(s) Oral three times a day  hydrALAZINE 50 milliGRAM(s) Oral every 8 hours  insulin glargine Injectable (LANTUS) 15 Unit(s) SubCutaneous at bedtime  insulin lispro (ADMELOG) corrective regimen sliding scale   SubCutaneous three times a day before meals  insulin lispro (ADMELOG) corrective regimen sliding scale   SubCutaneous at bedtime  insulin lispro Injectable (ADMELOG) 8 Unit(s) SubCutaneous three times a day before meals  isosorbide   mononitrate ER Tablet (IMDUR) 30 milliGRAM(s) Oral daily  levothyroxine 137 MICROGram(s) Oral daily  losartan 100 milliGRAM(s) Oral daily  senna 1 Tablet(s) Oral at bedtime    MEDICATIONS  (PRN):  acetaminophen   Tablet .. 650 milliGRAM(s) Oral every 6 hours PRN Temp greater or equal to 38C (100.4F), Mild Pain (1 - 3)  dextrose 40% Gel 15 Gram(s) Oral once PRN Blood Glucose LESS THAN 70 milliGRAM(s)/deciliter  glucagon  Injectable 1 milliGRAM(s) IntraMuscular once PRN Glucose LESS THAN 70 milligrams/deciliter  ondansetron Injectable 4 milliGRAM(s) IV Push every 6 hours PRN Nausea and/or Vomiting      ASSESSMENT / PLAN:  ----------------------------------------  70F with a history of coronary artery disease, CABG, hypertension, diabetes, and hypothyroidism who initially presented to the hospital for a nuclear stress test due to dyspnea on exertion. During the stress test, the patient was noted to have elevated blood pressures(200/110) and difficulty with speech and slow response to questions. A rapid response was activated and the patient transferred to the emergency department for evaluation.    Hypertensive emergency - Blood pressure improved. On amlodipine, hydralazine, isosorbide, and losartan. On telemetry.    Slurred speech and confusion - CT of the head was without acute intracranial pathology. MRI noted a prior stroke in the left basal ganglia and left thalamus but no acute infarcts.    Chest pain - On aspirin and atorvastatin. Nuclear stress test results noted small mild reversible defects in mid anterior and mid anteroseptal walls as well as medium sized moderate defects in basal, mid inferior, basal and mid infero-lateral walls. Planned for cardiac catheterization today.    Diabetes - Insulin coverage, close monitoring of blood glucose levels. Neuropathy improved on gabapentin.    Hypothyroidism - On levothyroxine.

## 2020-10-29 NOTE — PROGRESS NOTE ADULT - SUBJECTIVE AND OBJECTIVE BOX
Department of Cardiology                                             [facility]/[place] [address] [address] [facility]                                       Telephone: [phone]. Fax:[phone]                                             Post Procedure Cardiac Cath NP Note       Narrative:    69 y/o female with medical history DM Type I, CAD s/p PCI CABGX4 (2012), repeat cardiac catheterization (11/2018) secondary to recurrent chest pain with Dr. Erazo which showed patent grafts, Stage III CKD, CVA, Anemia, hypothyroidism, who presents to [facility]-ED for sudden onset slurred speech and chest pain during nuclear stress test.  Patient has been c/o SOB/MENEZES and PND. Had an outpatient NST c/b SOB, chest tightness and Hypertensive urgency. Patient is now admitted for management. Given patient's cardiac history and anginal symptoms, and Echo EF 65%  grade 2 diastolic dysfunction, Moderate to severe aortic valve stenosis, she is now s/p Right and Left heart catheterization.       s/p right and left heart catheterization via right groin approach with Dr. Fernandez  Found with patent grafts:  RPL --> OM -- > Ramus LAD, distal LAD with moderate disease   Moderate Pulmonary Hypertension 46/27 with normal wedge 11  Severe native vessel disease   Medical management recommended          PAST MEDICAL & SURGICAL HISTORY:  H/O diabetic retinopathy  vision loss L eye  Hiatal hernia  Breast cancer  Glaucoma  CVA (cerebral vascular accident)  R weakness  Diabetes mellitus  Hypothyroid  Hypercholesterolemia  Coronary artery disease  HTN (hypertension)  S/P hernia repair hiatal  S/P tonsillectomy  S/P appendectomy  S/P cardiac cath with stents  S/P CABG x 4  S/P breast reconstruction, bilateral  S/P mastectomy      FAMILY HISTORY:  No pertinent family history in first degree relatives      Home Medications:  aspirin 81 mg oral tablet: 1 tab(s) orally once a day (23 Oct 2020 14:56)  atorvastatin 40 mg oral tablet: 1 tab(s) orally once a day (23 Oct 2020 14:56)  carvedilol 12.5 mg oral tablet: 1 tab(s) orally 2 times a day (23 Oct 2020 14:56)  gabapentin 100 mg oral capsule: 1 cap(s) orally 2 times a day (23 Oct 2020 14:56)  hydrALAZINE 50 mg oral tablet: 1 tab(s) orally 2 times a day (23 Oct 2020 14:56)  Lantus 100 units/mL subcutaneous solution: 25 unit(s) subcutaneous once a day (23 Oct 2020 14:56)  levothyroxine 137 mcg (0.137 mg) oral tablet: 1 tab(s) orally once a day (23 Oct 2020 14:56)  losartan 100 mg oral tablet: 1 tab(s) orally once a day (23 Oct 2020 14:56)  NovoLOG 100 units/mL subcutaneous solution: 10 unit(s) subcutaneous 3 times a day (23 Oct 2020 14:56)      General: No fatigue, no fevers/chills  Respiratory: No dyspnea, no cough, no wheeze  CV: No chest pain, no palpitations  Abd: No nausea  Neuro: No headache, no dizziness  No Known Allergies      Objective:  Vital Signs Last 24 Hrs  T(C): 36.8 (29 Oct 2020 17:17), Max: 36.8 (28 Oct 2020 21:47)  T(F): 98.2 (29 Oct 2020 17:17), Max: 98.2 (28 Oct 2020 21:47)  HR: 72 (29 Oct 2020 17:17) (70 - 79)  BP: 177/84 (29 Oct 2020 17:17) (122/70 - 182/84)  RR: 20 (29 Oct 2020 17:17) (17 - 20)  SpO2: 99% (29 Oct 2020 17:17) (93% - 99%)      CM: SR  Neuro: A&OX3, CN 2-12 intact  HEENT: NC, AT  Lungs: CTA B/L  CV: S1, S2, no murmur, RRR  Abd: Soft  Right Groin: Soft, no bleeding, no hematoma  Extremity: + distal pulses    TELEMETRY:    NSR     DIAGNOSTICS:     [X ] Echocardiogram:    < from: TTE Echo Complete w/o Contrast w/ Doppler (10.23.20 @ 15:38) >  Summary:   1. Left ventricular ejection fraction, by visual estimation, is 60 to 65%.   2. Normal global left ventricular systolic function.   3. Spectral Doppler shows pseudonormal pattern of left ventricular myocardial filling (Grade II diastolic dysfunction). Elevated mean left atrial pressure.   4. Normal left ventricular internal cavity size.   5. There is mild concentric left ventricular hypertrophy.   6. Normal right ventricular size and function.   7. Moderately enlarged left atrium.   8. Normal right atrial size.   9. Moderate mitral annular calcification.  10. Mild thickening and calcification of the anterior and posterior mitral valve leaflets.  11. Trace mitral valve regurgitation.  12. Moderate to severe aortic valve stenosis. The Dimesionless Index value is 0.29.  13. Mild pulmonic valve regurgitation.  14. Dilatation of the aortic root.  15. There is no evidence of pericardial effusion.    MD Shellie Electronically signed on 10/24/2020 at 11:07:01 AM  *** Final ***    YASMINE MONTALVO MD; Attending Cardiologist  This document has been electronically signed. Oct 23 2020  3:38PM    < end of copied text >    [X ]  Catheterization:    < from: Cardiac Cath Lab - Adult (11.06.18 @ 16:04) >  PROCEDURE:  --  Left heart catheterization.  --  Left coronary angiography.  --  Right coronary angiography.  --  Sonosite.  --  Hemostasis with Mynx.    VENTRICLES: No LV gram was performed; however, a recent echocardiogram  demonstrated an EF of 55 %.  CORONARY VESSELS: The coronary circulation is right dominant.  LM:   --  Distal left main: There was a 50 % stenosis.  LAD:   --Ostial LAD: There was a 80 % stenosis.  --  Proximal LAD: There was a 50 % stenosis in-stent.  --  Mid LAD: There was a 70 % stenosis in-stent.  CX:   --  Proximal circumflex: There was a 70 % stenosis.  RCA:   --  Proximal RCA: There was a 80 % stenosis.  --  Distal RCA: There was a 50 % stenosis.  GRAFTS:   --  Graft to the distal LAD: The graft was a sequential LIMA.  Graft angiography showed no evidence of disease.  --  Graft to the 1st diagonal: The graft was a LIMA. Graft angiography  showed no evidence of disease.  --  Graft to the 2nd diagonal: The graft was a saphenous vein graft. Graft  angiography showed no evidence of disease.  --  Graft to the 1st obtuse marginal: The graft was a saphenous vein graft.  Graft angiography showed noevidence of disease.  --  Graft to the RPDA: The graft was a sequential vein graft. Graft  angiography showed no evidence of disease.  COMPLICATIONS: No complications occurred during the cath lab visit.  DIAGNOSTIC IMPRESSIONS: Patent Grafts.  No newdisease.  DIAGNOSTIC RECOMMENDATIONS: Assess for non cardiac causes of chest pain.  Patient has hiatal hernia which could be the etiology. Increase PPI to bid.  Prepared and signed by  Riccardo Erazo MD  Signed 11/06/2018 17:53:43    < end of copied text >      < from: Nuclear Stress Test-Pharmacologic (10.23.20 @ 09:34) >  IMPRESSIONS:Abnormal Study  * Unable to walk due to covid pandemic precautions  * Chest Pain: No chest pain with administration of  Regadenoson.  * Symptom: No Symptom.  * HR Response: Appropriate.  * BP Response: Appropriate.  * Heart Rhythm: Normal Sinus Rhythm.  * ECG Abnormalities: There were no diagnostic changes.  * Arrhythmia: 1 VPDs occurred.  * As per Stress  lab NP notes: "was Called by [employer] tech  to evaluate patient for elevated heart rate during last  minute of stress pictures. Patients speech was delayed,  NST on monitor. Aminophylline 75mg iv x 1 given, patient  hypertensive hydralzine 10mg IV x1 given. Rapid response  called and patient brought to ED."  * Review of raw data shows: Breast attenuation artifact.,  Diaphragmatic artifact.  * The left ventricle was normal LV size. There are small,  mild defects in mid anterior, mid anteroseptal  walls that  are predominantly reversible, suggestive of ischemia.There  are medium sized, moderate defects in basal and mid  inferior, basal and mid infero-lateral walls that are  minimally reversible suggestive of infarction with  mild  cathy-infarct ischemi  *  Hypokinesis of basal inferior and inferolateral wall.  Poststress LVEF  69%.      Confirmed on  10/23/2020 - 12:04:23 at [facility] Cardiology by  Devin Limon MD   Cardiology Fellow: Deedee Kumar  ------------------------------------------------------------------------    < end of copied text >      ASSESSMENT AND PLAN:      69 y/o female with medical history DM Type I, CAD s/p PCI CABGX4 (2012), repeat cardiac catheterization (11/2018) secondary to recurrent chest pain with Dr. Erazo which showed patent grafts, Stage III CKD, CVA, Anemia, hypothyroidism, who presents to [facility]-ED for sudden onset slurred speech and chest pain during nuclear stress test.  Outpatient NST c/b SOB, chest tightness and Hypertensive urgency. Given patient's cardiac history, presenting symptoms, abnormal NST and moderate to sever AS on echo she had a right and left heart catheterization to evaluate progression of CAD and gradient across aortic valve.       -post cardiac cath orders  -right groin precautions  -R groin site  dressing CDI no bleeding no hematoma noted, site soft non tender, positive pedal pulses bilat  -right groin hemostasis achieved via Angioseal   -recommend continue current medical therapy  -pt with elevated BP post procedure SBP ~180 - 190, hydralazine 5 mg IV x1 given  -will increase hydralazine to 75 mg po BID, if pts heartrate can tolerate it can increase coreg   -plan for continued medical management  - Department of Cardiology                                             [facility]/[place] [address] [address] [facility]                                       Telephone: [phone]. Fax:[phone]                                             Post Procedure Cardiac Cath NP Note       Narrative:    71 y/o female with medical history DM Type I, CAD s/p PCI CABGX4 (2012), repeat cardiac catheterization (11/2018) secondary to recurrent chest pain with Dr. Erazo which showed patent grafts, Stage III CKD, CVA, Anemia, hypothyroidism, who presents to [facility]-ED for sudden onset slurred speech and chest pain during nuclear stress test.  Patient has been c/o SOB/MENEZES and PND. Had an outpatient NST c/b SOB, chest tightness and Hypertensive urgency. Patient is now admitted for management. Given patient's cardiac history and anginal symptoms, and Echo EF 65%  grade 2 diastolic dysfunction, Moderate to severe aortic valve stenosis, she is now s/p Right and Left heart catheterization.       s/p right and left heart catheterization via right groin approach with Dr. Fernandez  Found with patent grafts:  RPL --> OM -- > Ramus LAD, distal LAD with moderate disease   Moderate Pulmonary Hypertension 46/27 with normal wedge 11  Severe native vessel disease   Medical management recommended          PAST MEDICAL & SURGICAL HISTORY:  H/O diabetic retinopathy  vision loss L eye  Hiatal hernia  Breast cancer  Glaucoma  CVA (cerebral vascular accident)  R weakness  Diabetes mellitus  Hypothyroid  Hypercholesterolemia  Coronary artery disease  HTN (hypertension)  S/P hernia repair hiatal  S/P tonsillectomy  S/P appendectomy  S/P cardiac cath with stents  S/P CABG x 4  S/P breast reconstruction, bilateral  S/P mastectomy      FAMILY HISTORY:  No pertinent family history in first degree relatives      Home Medications:  aspirin 81 mg oral tablet: 1 tab(s) orally once a day (23 Oct 2020 14:56)  atorvastatin 40 mg oral tablet: 1 tab(s) orally once a day (23 Oct 2020 14:56)  carvedilol 12.5 mg oral tablet: 1 tab(s) orally 2 times a day (23 Oct 2020 14:56)  gabapentin 100 mg oral capsule: 1 cap(s) orally 2 times a day (23 Oct 2020 14:56)  hydrALAZINE 50 mg oral tablet: 1 tab(s) orally 2 times a day (23 Oct 2020 14:56)  Lantus 100 units/mL subcutaneous solution: 25 unit(s) subcutaneous once a day (23 Oct 2020 14:56)  levothyroxine 137 mcg (0.137 mg) oral tablet: 1 tab(s) orally once a day (23 Oct 2020 14:56)  losartan 100 mg oral tablet: 1 tab(s) orally once a day (23 Oct 2020 14:56)  NovoLOG 100 units/mL subcutaneous solution: 10 unit(s) subcutaneous 3 times a day (23 Oct 2020 14:56)      General: No fatigue, no fevers/chills  Respiratory: No dyspnea, no cough, no wheeze  CV: No chest pain, no palpitations  Abd: No nausea  Neuro: No headache, no dizziness  No Known Allergies      Objective:  Vital Signs Last 24 Hrs  T(C): 36.8 (29 Oct 2020 17:17), Max: 36.8 (28 Oct 2020 21:47)  T(F): 98.2 (29 Oct 2020 17:17), Max: 98.2 (28 Oct 2020 21:47)  HR: 72 (29 Oct 2020 17:17) (70 - 79)  BP: 177/84 (29 Oct 2020 17:17) (122/70 - 182/84)  RR: 20 (29 Oct 2020 17:17) (17 - 20)  SpO2: 99% (29 Oct 2020 17:17) (93% - 99%)      CM: SR  Neuro: A&OX3, CN 2-12 intact  HEENT: NC, AT  Lungs: CTA B/L  CV: S1, S2, no murmur, RRR  Abd: Soft  Right Groin: Soft, no bleeding, no hematoma  Extremity: + distal pulses    TELEMETRY:    NSR     DIAGNOSTICS:     [X ] Echocardiogram:    < from: TTE Echo Complete w/o Contrast w/ Doppler (10.23.20 @ 15:38) >  Summary:   1. Left ventricular ejection fraction, by visual estimation, is 60 to 65%.   2. Normal global left ventricular systolic function.   3. Spectral Doppler shows pseudonormal pattern of left ventricular myocardial filling (Grade II diastolic dysfunction). Elevated mean left atrial pressure.   4. Normal left ventricular internal cavity size.   5. There is mild concentric left ventricular hypertrophy.   6. Normal right ventricular size and function.   7. Moderately enlarged left atrium.   8. Normal right atrial size.   9. Moderate mitral annular calcification.  10. Mild thickening and calcification of the anterior and posterior mitral valve leaflets.  11. Trace mitral valve regurgitation.  12. Moderate to severe aortic valve stenosis. The Dimesionless Index value is 0.29.  13. Mild pulmonic valve regurgitation.  14. Dilatation of the aortic root.  15. There is no evidence of pericardial effusion.    MD Shellie Electronically signed on 10/24/2020 at 11:07:01 AM  *** Final ***    YASMINE MONTALVO MD; Attending Cardiologist  This document has been electronically signed. Oct 23 2020  3:38PM    < end of copied text >    [X ]  Catheterization:    < from: Cardiac Cath Lab - Adult (11.06.18 @ 16:04) >  PROCEDURE:  --  Left heart catheterization.  --  Left coronary angiography.  --  Right coronary angiography.  --  Sonosite.  --  Hemostasis with Mynx.    VENTRICLES: No LV gram was performed; however, a recent echocardiogram  demonstrated an EF of 55 %.  CORONARY VESSELS: The coronary circulation is right dominant.  LM:   --  Distal left main: There was a 50 % stenosis.  LAD:   --Ostial LAD: There was a 80 % stenosis.  --  Proximal LAD: There was a 50 % stenosis in-stent.  --  Mid LAD: There was a 70 % stenosis in-stent.  CX:   --  Proximal circumflex: There was a 70 % stenosis.  RCA:   --  Proximal RCA: There was a 80 % stenosis.  --  Distal RCA: There was a 50 % stenosis.  GRAFTS:   --  Graft to the distal LAD: The graft was a sequential LIMA.  Graft angiography showed no evidence of disease.  --  Graft to the 1st diagonal: The graft was a LIMA. Graft angiography  showed no evidence of disease.  --  Graft to the 2nd diagonal: The graft was a saphenous vein graft. Graft  angiography showed no evidence of disease.  --  Graft to the 1st obtuse marginal: The graft was a saphenous vein graft.  Graft angiography showed noevidence of disease.  --  Graft to the RPDA: The graft was a sequential vein graft. Graft  angiography showed no evidence of disease.  COMPLICATIONS: No complications occurred during the cath lab visit.  DIAGNOSTIC IMPRESSIONS: Patent Grafts.  No newdisease.  DIAGNOSTIC RECOMMENDATIONS: Assess for non cardiac causes of chest pain.  Patient has hiatal hernia which could be the etiology. Increase PPI to bid.  Prepared and signed by  Riccardo Erazo MD  Signed 11/06/2018 17:53:43    < end of copied text >      < from: Nuclear Stress Test-Pharmacologic (10.23.20 @ 09:34) >  IMPRESSIONS:Abnormal Study  * Unable to walk due to covid pandemic precautions  * Chest Pain: No chest pain with administration of  Regadenoson.  * Symptom: No Symptom.  * HR Response: Appropriate.  * BP Response: Appropriate.  * Heart Rhythm: Normal Sinus Rhythm.  * ECG Abnormalities: There were no diagnostic changes.  * Arrhythmia: 1 VPDs occurred.  * As per Stress  lab NP notes: "was Called by [employer] tech  to evaluate patient for elevated heart rate during last  minute of stress pictures. Patients speech was delayed,  NST on monitor. Aminophylline 75mg iv x 1 given, patient  hypertensive hydralzine 10mg IV x1 given. Rapid response  called and patient brought to ED."  * Review of raw data shows: Breast attenuation artifact.,  Diaphragmatic artifact.  * The left ventricle was normal LV size. There are small,  mild defects in mid anterior, mid anteroseptal  walls that   are predominantly reversible, suggestive of ischemia. There  are medium sized, moderate defects in basal and mid  inferior, basal and mid infero-lateral walls that are  minimally reversible suggestive of infarction with  mild  cathy-infarct ischemia  *  Hypokinesis of basal inferior and inferolateral wall.  Poststress LVEF  69%.      Confirmed on  10/23/2020 - 12:04:23 at [facility] Cardiology by  Devin Limon MD   Cardiology Fellow: Deedee Kumar  ------------------------------------------------------------------------    < end of copied text >      ASSESSMENT AND PLAN:      71 y/o female with medical history DM Type I, CAD s/p PCI CABGX4 (2012), repeat cardiac catheterization (11/2018) secondary to recurrent chest pain with Dr. Erazo which showed patent grafts, Stage III CKD, CVA, Anemia, hypothyroidism, who presents to [facility]-ED for sudden onset slurred speech and chest pain during nuclear stress test.  Outpatient NST c/b SOB, chest tightness and Hypertensive urgency. Given patient's cardiac history, presenting symptoms, abnormal NST and moderate to sever AS on echo she had a right and left heart catheterization to evaluate progression of CAD and gradient across aortic valve.       -post cardiac cath orders  -right groin precautions  -R groin site  dressing CDI no bleeding no hematoma noted, site soft non tender, positive pedal pulses bilat  -right groin hemostasis achieved via Angioseal   -recommend continue current medical therapy of known CAD   -pt with elevated BP post procedure SBP ~180 - 190, hydralazine 5 mg IV x1 given  -will increase hydralazine to 75 mg po BID, if pt's heartrate can tolerate it can increase coreg   -plan for continued medical management    Department of Cardiology                                             [facility]/[place] [address] [address]                                       Telephone: [phone]. Fax:[phone]                                             Post Procedure Cardiac Cath NP Note       Narrative:    69 y/o female with medical history DM Type I, CAD s/p PCI CABGX4 (2012), repeat cardiac catheterization (11/2018) secondary to recurrent chest pain with Dr. Erazo which showed patent grafts, Stage III CKD, CVA, Anemia, hypothyroidism, who presents to [facility]-ED for sudden onset slurred speech and chest pain during nuclear stress test.  Patient has been c/o SOB/MENEZES and PND. Had an outpatient NST c/b SOB, chest tightness and Hypertensive urgency. Patient is now admitted for management. Given patient's cardiac history and anginal symptoms, and Echo EF 65%  grade 2 diastolic dysfunction, Moderate to severe aortic valve stenosis, she is now s/p Right and Left heart catheterization.       s/p right and left heart catheterization via right groin approach with Dr. Fernandez  Found with patent grafts:  RPL --> OM -- > Ramus LAD, distal LAD with moderate disease   Moderate Pulmonary Hypertension 46/27 with normal wedge 11  Severe native vessel disease   Medical management recommended          PAST MEDICAL & SURGICAL HISTORY:  H/O diabetic retinopathy  vision loss L eye  Hiatal hernia  Breast cancer  Glaucoma  CVA (cerebral vascular accident)  R weakness  Diabetes mellitus  Hypothyroid  Hypercholesterolemia  Coronary artery disease  HTN (hypertension)  S/P hernia repair hiatal  S/P tonsillectomy  S/P appendectomy  S/P cardiac cath with stents  S/P CABG x 4  S/P breast reconstruction, bilateral  S/P mastectomy      FAMILY HISTORY:  No pertinent family history in first degree relatives      Home Medications:  aspirin 81 mg oral tablet: 1 tab(s) orally once a day (23 Oct 2020 14:56)  atorvastatin 40 mg oral tablet: 1 tab(s) orally once a day (23 Oct 2020 14:56)  carvedilol 12.5 mg oral tablet: 1 tab(s) orally 2 times a day (23 Oct 2020 14:56)  gabapentin 100 mg oral capsule: 1 cap(s) orally 2 times a day (23 Oct 2020 14:56)  hydrALAZINE 50 mg oral tablet: 1 tab(s) orally 2 times a day (23 Oct 2020 14:56)  Lantus 100 units/mL subcutaneous solution: 25 unit(s) subcutaneous once a day (23 Oct 2020 14:56)  levothyroxine 137 mcg (0.137 mg) oral tablet: 1 tab(s) orally once a day (23 Oct 2020 14:56)  losartan 100 mg oral tablet: 1 tab(s) orally once a day (23 Oct 2020 14:56)  NovoLOG 100 units/mL subcutaneous solution: 10 unit(s) subcutaneous 3 times a day (23 Oct 2020 14:56)      General: No fatigue, no fevers/chills  Respiratory: No dyspnea, no cough, no wheeze  CV: No chest pain, no palpitations  Abd: No nausea  Neuro: No headache, no dizziness  No Known Allergies      Objective:  Vital Signs Last 24 Hrs  T(C): 36.8 (29 Oct 2020 17:17), Max: 36.8 (28 Oct 2020 21:47)  T(F): 98.2 (29 Oct 2020 17:17), Max: 98.2 (28 Oct 2020 21:47)  HR: 72 (29 Oct 2020 17:17) (70 - 79)  BP: 177/84 (29 Oct 2020 17:17) (122/70 - 182/84)  RR: 20 (29 Oct 2020 17:17) (17 - 20)  SpO2: 99% (29 Oct 2020 17:17) (93% - 99%)      CM: SR  Neuro: A&OX3, CN 2-12 intact  HEENT: NC, AT  Lungs: CTA B/L  CV: S1, S2, no murmur, RRR  Abd: Soft  Right Groin: Soft, no bleeding, no hematoma  Extremity: + distal pulses    TELEMETRY:    NSR     DIAGNOSTICS:     [X ] Echocardiogram:    < from: TTE Echo Complete w/o Contrast w/ Doppler (10.23.20 @ 15:38) >  Summary:   1. Left ventricular ejection fraction, by visual estimation, is 60 to 65%.   2. Normal global left ventricular systolic function.   3. Spectral Doppler shows pseudonormal pattern of left ventricular myocardial filling (Grade II diastolic dysfunction). Elevated mean left atrial pressure.   4. Normal left ventricular internal cavity size.   5. There is mild concentric left ventricular hypertrophy.   6. Normal right ventricular size and function.   7. Moderately enlarged left atrium.   8. Normal right atrial size.   9. Moderate mitral annular calcification.  10. Mild thickening and calcification of the anterior and posterior mitral valve leaflets.  11. Trace mitral valve regurgitation.  12. Moderate to severe aortic valve stenosis. The Dimesionless Index value is 0.29.  13. Mild pulmonic valve regurgitation.  14. Dilatation of the aortic root.  15. There is no evidence of pericardial effusion.    MD Shellie Electronically signed on 10/24/2020 at 11:07:01 AM  *** Final ***    YASMINE MONTALVO MD; Attending Cardiologist  This document has been electronically signed. Oct 23 2020  3:38PM    < end of copied text >    [X ]  Catheterization:    < from: Cardiac Cath Lab - Adult (11.06.18 @ 16:04) >  PROCEDURE:  --  Left heart catheterization.  --  Left coronary angiography.  --  Right coronary angiography.  --  Sonosite.  --  Hemostasis with Mynx.    VENTRICLES: No LV gram was performed; however, a recent echocardiogram  demonstrated an EF of 55 %.  CORONARY VESSELS: The coronary circulation is right dominant.  LM:   --  Distal left main: There was a 50 % stenosis.  LAD:   --Ostial LAD: There was a 80 % stenosis.  --  Proximal LAD: There was a 50 % stenosis in-stent.  --  Mid LAD: There was a 70 % stenosis in-stent.  CX:   --  Proximal circumflex: There was a 70 % stenosis.  RCA:   --  Proximal RCA: There was a 80 % stenosis.  --  Distal RCA: There was a 50 % stenosis.  GRAFTS:   --  Graft to the distal LAD: The graft was a sequential LIMA.  Graft angiography showed no evidence of disease.  --  Graft to the 1st diagonal: The graft was a LIMA. Graft angiography  showed no evidence of disease.  --  Graft to the 2nd diagonal: The graft was a saphenous vein graft. Graft  angiography showed no evidence of disease.  --  Graft to the 1st obtuse marginal: The graft was a saphenous vein graft.  Graft angiography showed noevidence of disease.  --  Graft to the RPDA: The graft was a sequential vein graft. Graft  angiography showed no evidence of disease.  COMPLICATIONS: No complications occurred during the cath lab visit.  DIAGNOSTIC IMPRESSIONS: Patent Grafts.  No newdisease.  DIAGNOSTIC RECOMMENDATIONS: Assess for non cardiac causes of chest pain.  Patient has hiatal hernia which could be the etiology. Increase PPI to bid.  Prepared and signed by  Riccardo Erazo MD  Signed 11/06/2018 17:53:43    < end of copied text >      < from: Nuclear Stress Test-Pharmacologic (10.23.20 @ 09:34) >  IMPRESSIONS:Abnormal Study  * Unable to walk due to covid pandemic precautions  * Chest Pain: No chest pain with administration of  Regadenoson.  * Symptom: No Symptom.  * HR Response: Appropriate.  * BP Response: Appropriate.  * Heart Rhythm: Normal Sinus Rhythm.  * ECG Abnormalities: There were no diagnostic changes.  * Arrhythmia: 1 VPDs occurred.  * As per Stress  lab NP notes: "was Called by [facility] tech  to evaluate patient for elevated heart rate during last  minute of stress pictures. Patients speech was delayed,  NST on monitor. Aminophylline 75mg iv x 1 given, patient  hypertensive hydralzine 10mg IV x1 given. Rapid response  called and patient brought to ED."  * Review of raw data shows: Breast attenuation artifact.,  Diaphragmatic artifact.  * The left ventricle was normal LV size. There are small,  mild defects in mid anterior, mid anteroseptal  walls that   are predominantly reversible, suggestive of ischemia. There  are medium sized, moderate defects in basal and mid  inferior, basal and mid infero-lateral walls that are  minimally reversible suggestive of infarction with  mild  cathy-infarct ischemia  *  Hypokinesis of basal inferior and inferolateral wall.  Poststress LVEF  69%.      Confirmed on  10/23/2020 - 12:04:23 at [facility] by  Devin Limon MD   Cardiology Fellow: Deedee Kumar  ------------------------------------------------------------------------    < end of copied text >      ASSESSMENT AND PLAN:      69 y/o female with medical history DM Type I, CAD s/p PCI CABGX4 (2012), repeat cardiac catheterization (11/2018) secondary to recurrent chest pain with Dr. Erazo which showed patent grafts, Stage III CKD, CVA, Anemia, hypothyroidism, who presents to [facility]ED for sudden onset slurred speech and chest pain during nuclear stress test.  Outpatient NST c/b SOB, chest tightness and Hypertensive urgency. Given patient's cardiac history, presenting symptoms, abnormal NST and moderate to sever AS on echo she had a right and left heart catheterization to evaluate progression of CAD and gradient across aortic valve.       -post cardiac cath orders  -right groin precautions  -R groin site  dressing CDI no bleeding no hematoma noted, site soft non tender, positive pedal pulses bilat  -right groin hemostasis achieved via Angioseal   -recommend continue current medical therapy of known CAD   -pt with elevated BP post procedure SBP ~180 - 190, hydralazine 5 mg IV x1 given  -can increase hydralazine to 75 mg po BID, if pt's heartrate can tolerate it can increase coreg   -plan for continued medical management

## 2020-10-29 NOTE — PROGRESS NOTE ADULT - SUBJECTIVE AND OBJECTIVE BOX
[place] [facility]                                                               Office: [address] [address]                                                              Telephone: [phone]. Fax:[phone]                                                                             PROGRESS NOTE  Reason for follow up: Moderate to severe AS with Chest pain and dyspnea on exertion w/ abnormal nuclear stress test  Overnight: No new events.   Update:   MRI shows no evidence of acute ischemic changes   Subjective: "  _____no complaints _________________"      	  Vitals:  T(C): 36.7 (10-29-20 @ 15:53), Max: 36.8 (10-28-20 @ 21:47)  HR: 73 (10-29-20 @ 15:53) (70 - 79)  BP: 180/73 (10-29-20 @ 15:53) (122/70 - 180/73)  RR: 17 (10-29-20 @ 15:53) (17 - 19)  SpO2: 93% (10-29-20 @ 15:53) (93% - 99%)  Wt(kg): --  I&O's Summary          PHYSICAL EXAM:  Appearance: Comfortable. No acute distress  HEENT:  Head and neck: Atraumatic. Normocephalic.  Normal oral mucosa, PERRL, Neck is supple. No JVD, No carotid bruit.   Neurologic: A & O x 3, no focal deficits. EOMI.  Lymphatic: No cervical lymphadenopathy  Cardiovascular: Normal S1 S2, No murmur, rubs/gallops. No JVD, No edema + MICHELLE radiating to the carotids   Respiratory: Lungs clear to auscultation  Gastrointestinal:  Soft, Non-tender, + BS  Lower Extremities: No edema  Psychiatry: Patient is calm. No agitation. Mood & affect appropriate  Skin: No rashes/ ecchymoses/cyanosis/ulcers visualized on the face, hands or feet.      CURRENT MEDICATIONS:  amLODIPine   Tablet 10 milliGRAM(s) Oral daily  hydrALAZINE 50 milliGRAM(s) Oral every 8 hours  isosorbide   mononitrate ER Tablet (IMDUR) 30 milliGRAM(s) Oral daily  losartan 100 milliGRAM(s) Oral daily    gabapentin  senna  atorvastatin  dextrose 50% Injectable  dextrose 50% Injectable  dextrose 50% Injectable  insulin glargine Injectable (LANTUS)  insulin lispro (ADMELOG) corrective regimen sliding scale  insulin lispro (ADMELOG) corrective regimen sliding scale  insulin lispro Injectable (ADMELOG)  levothyroxine  aspirin  chewable  dextrose 5%.  enoxaparin Injectable      DIAGNOSTIC TESTING:  [ ] Echocardiogram:     < from: TTE Echo Complete w/o Contrast w/ Doppler (10.23.20 @ 15:38) >  Summary:   1. Left ventricular ejection fraction, by visual estimation, is 60 to 65%.   2. Normal global left ventricular systolic function.   3. Spectral Doppler shows pseudonormal pattern of left ventricular myocardial filling (Grade II diastolic dysfunction). Elevated mean left atrial pressure.   4. Normal left ventricular internal cavity size.   5. There is mild concentric left ventricular hypertrophy.   6. Normal right ventricular size and function.   7. Moderately enlarged left atrium.   8. Normal right atrial size.   9. Moderate mitral annular calcification.  10. Mild thickening and calcification of the anterior and posterior mitral valve leaflets.  11. Trace mitral valve regurgitation.  12. Moderate to severe aortic valve stenosis. The Dimesionless Index value is 0.29.  13. Mild pulmonic valve regurgitation.  14. Dilatation of the aortic root.  15. There is no evidence of pericardial effusion.    < from: Cardiac Cath Lab - Adult (11.06.18 @ 16:04) >  VENTRICLES: No LV gram was performed; however, a recent echocardiogram  demonstrated an EF of 55 %.  CORONARY VESSELS: The coronary circulation is right dominant.  LM:   --  Distal left main: There was a 50 % stenosis.  LAD:   --Ostial LAD: There was a 80 % stenosis.sm  --  Proximal LAD: There was a 50 % stenosis in-stent.  --  Mid LAD: There was a 70 % stenosis in-stent.  CX:   --  Proximal circumflex: There was a 70 % stenosis.  RCA:   --  Proximal RCA: There was a 80 % stenosis.  --  Distal RCA: There was a 50 % stenosis.  GRAFTS:   --  Graft to the distal LAD: The graft was a sequential LIMA.  Graft angiography showed no evidence of disease.  --  Graft to the 1st diagonal: The graft was a LIMA. Graft angiography  showed no evidence of disease.  --  Graft to the 2nd diagonal: The graft was a saphenous vein graft. Graft  angiography showed no evidence of disease.  --  Graft to the 1st obtuse marginal: The graft was a saphenous vein graft.  Graft angiography showed noevidence of disease.  --  Graft to the RPDA: The graft was a sequential vein graft. Graft  angiography showed no evidence of disease.  COMPLICATIONS: No complications occurred during the cath lab visit.  DIAGNOSTIC IMPRESSIONS: Patent Grafts.  No newdisease.  DIAGNOSTIC RECOMMENDATIONS: Assess for non cardiac causes of chest pain.  Patient has hiatal hernia which could be the etiology. Increase PPI to bid.  Prepared and signed by  Riccardo Erazo MD  Signed 11/06/2018 17:53:43    < end of copied text >    [ ] Stress Test:    < from: Nuclear Stress Test-Pharmacologic (10.23.20 @ 09:34) >  NUCLEAR FINDINGS:  Review of raw data shows: Breast attenuation artifact.,  Diaphragmatic artifact.  The left ventricle was normal LV size. There are small,  mild defects in mid anterior, mid anteroseptalwalls that  are predominantly reversible, suggestive of ischemia.There  are medium sized, moderate defects in basal and mid  inferior, basal and mid infero-lateral walls that are  minimally reversible suggestive of infarction with  mild  cathy-infarctischemi  ------------------------------------------------------------------------    < from: MR Angio Head No Cont (10.27.20 @ 13:43) >    1. Chronic left MCA occlusion in the proximal left M1 segment.  2. Fairly extensive atherosclerotic changes the in the vertebrobasilar system and both posterior cerebral arteries. Atherosclerotic changes also noted in the right middle cerebral and both anterior cerebral arteries.    < end of copied text >  < from: MR Head No Cont (10.27.20 @ 13:31) >    IMPRESSION:  1)  Old left MCA infarct. Chronic ischemic changes scattered in the white matter of both hemispheres as well as within the posterior fossa..  2)  no diffusion restriction or MR evidence of acute ischemia. No space-occupying lesion noted..  [ ]  Catheterization:  [ ] Stress Test:    OTHER: 	      LABS:	 	              proBNP:   Lipid Profile: Date: 10-27 @ 06:48  Total cholesterol 167; Direct LDL: --; HDL: 66; Triglycerides:61    HgA1c:   TSH:       TELEMETRY: Reviewed    ECG:  Reviewed by me.

## 2020-10-29 NOTE — PROGRESS NOTE ADULT - REASON FOR ADMISSION
TIA
Dysarthria / Hypertensive urgency
Moderate to severe AS with Chest pain and dyspnea on exertion w/ abnormal nuclear stress test
htn
TIA-
slurred speech/dizziness

## 2020-10-29 NOTE — PROGRESS NOTE ADULT - SUBJECTIVE AND OBJECTIVE BOX
Department of Cardiology                                                [facility]/[place] [address] [address] [facility]                                            Telephone: [phone]. Fax:[phone]                                                 Pre Procedure Cardiac Cath NP Note      Narrative:      71 y/o female with medical history DM Type I, CAD s/p PCI CABGX4 (2012), repeat cardiac catheterization (11/2018) secondary to recurrent chest pain with Dr. Erazo which showed patent grafts, Stage III CKD, CVA, Anemia, hypothyroidism, who presents to [facility]ED for sudden onset slurred speech and chest pain during nuclear stress test.  MRI with old left MCA infrct with ischemic changes, MRA neck with extensive atherosclerotic changes Echo EF 65%  grade 2 diastolic dysfunction, Moderate to severe aortic valve stenosis. The Dimensionless Index value is 0.29.  NST with small mild defects in the mid ant anteroseptal walls suggestive of ischemia. Patient presented to [facility] from outpatient setting while undergoing NST c/b RRT. Was found to be hypertensive with change in mental status. Patient c/o band-like tightness across chest associated with SOB. States that for the past 3 months has been having exertional SOB, and PND for the past year. Patient is now scheduled for right and left heart catheterization to evaluate progression of CAD and gradient across aortic valve.       ASA:   3  Mallampati:    2  Bleeding Risk:   6.9%  Creatinine:  1.01  GFR:  56     	  MEDICATIONS:  amLODIPine   Tablet 10 milliGRAM(s) Oral daily  hydrALAZINE 50 milliGRAM(s) Oral every 8 hours  isosorbide   mononitrate ER Tablet (IMDUR) 30 milliGRAM(s) Oral daily  losartan 100 milliGRAM(s) Oral daily  acetaminophen   Tablet .. 650 milliGRAM(s) Oral every 6 hours PRN  gabapentin 100 milliGRAM(s) Oral three times a day  ondansetron Injectable 4 milliGRAM(s) IV Push every 6 hours PRN  senna 1 Tablet(s) Oral at bedtime  atorvastatin 40 milliGRAM(s) Oral at bedtime  dextrose 40% Gel 15 Gram(s) Oral once PRN  dextrose 50% Injectable 12.5 Gram(s) IV Push once  dextrose 50% Injectable 25 Gram(s) IV Push once  dextrose 50% Injectable 25 Gram(s) IV Push once  glucagon  Injectable 1 milliGRAM(s) IntraMuscular once PRN  insulin glargine Injectable (LANTUS) 15 Unit(s) SubCutaneous at bedtime  insulin lispro (ADMELOG) corrective regimen sliding scale   SubCutaneous three times a day before meals  insulin lispro (ADMELOG) corrective regimen sliding scale   SubCutaneous at bedtime  insulin lispro Injectable (ADMELOG) 8 Unit(s) SubCutaneous three times a day before meals  levothyroxine 137 MICROGram(s) Oral daily  aspirin  chewable 81 milliGRAM(s) Oral daily  dextrose 5%. 1000 milliLiter(s) IV Continuous <Continuous>  enoxaparin Injectable 40 milliGRAM(s) SubCutaneous daily        PHYSICAL EXAM:    T(C): 36.7 (10-29-20 @ 15:53), Max: 36.8 (10-28-20 @ 21:47)  HR: 70 (10-29-20 @ 16:46) (70 - 79)  BP: 148/90 (10-29-20 @ 16:46) (122/70 - 182/84)  RR: 17 (10-29-20 @ 15:53) (17 - 19)  SpO2: 93% (10-29-20 @ 15:53) (93% - 99%)      Constitutional: A & O x 3  HEENT:   Normal oral mucosa, PERRL, EOMI	  Cardiovascular: Normal S1 S2, No JVD, No murmurs, No edema  Respiratory: Lungs clear to auscultation	  Gastrointestinal:  Soft, Non-tender, + BS	  Skin: No rashes, No ecchymoses, No cyanosis  Neurologic: Non-focal  Extremities: Normal range of motion, No clubbing, cyanosis or edema  Vascular: Peripheral pulses palpable 2+ bilaterally    TELEMETRY: 	NSR       ECG:  	  < from: 12 Lead ECG (10.23.20 @ 11:33) >  Normal sinus rhythm  Normal ECG    Confirmed by SHARLENE HUNTER ([id number]) on 10/24/2020 12:36:55 PM    < end of copied text >    RADIOLOGY:   DIAGNOSTIC TESTING:  [X ] Echocardiogram:    < from: TTE Echo Complete w/o Contrast w/ Doppler (10.23.20 @ 15:38) >  Summary:   1. Left ventricular ejection fraction, by visual estimation, is 60 to 65%.   2. Normal global left ventricular systolic function.   3. Spectral Doppler shows pseudonormal pattern of left ventricular myocardial filling (Grade II diastolic dysfunction). Elevated mean left atrial pressure.   4. Normal left ventricular internal cavity size.   5. There is mild concentric left ventricular hypertrophy.   6. Normal right ventricular size and function.   7. Moderately enlarged left atrium.   8. Normal right atrial size.   9. Moderate mitral annular calcification.  10. Mild thickening and calcification of the anterior and posterior mitral valve leaflets.  11. Trace mitral valve regurgitation.  12. Moderate to severe aortic valve stenosis. The Dimesionless Index value is 0.29.  13. Mild pulmonic valve regurgitation.  14. Dilatation of the aortic root.  15. There is no evidence of pericardial effusion.    MD Shellie Electronically signed on 10/24/2020 at 11:07:01 AM  *** Final ***    YASMINE MONTALVO MD; Attending Cardiologist  This document has been electronically signed. Oct 23 2020  3:38PM    < end of copied text >    [X ]  Catheterization:    < from: Cardiac Cath Lab - Adult (11.06.18 @ 16:04) >  PROCEDURE:  --  Left heart catheterization.  --  Left coronary angiography.  --  Right coronary angiography.  --  Sonosite.  --  Hemostasis with Mynx.    VENTRICLES: No LV gram was performed; however, a recent echocardiogram  demonstrated an EF of 55 %.  CORONARY VESSELS: The coronary circulation is right dominant.  LM:   --  Distal left main: There was a 50 % stenosis.  LAD:   --Ostial LAD: There was a 80 % stenosis.  --  Proximal LAD: There was a 50 % stenosis in-stent.  --  Mid LAD: There was a 70 % stenosis in-stent.  CX:   --  Proximal circumflex: There was a 70 % stenosis.  RCA:   --  Proximal RCA: There was a 80 % stenosis.  --  Distal RCA: There was a 50 % stenosis.  GRAFTS:   --  Graft to the distal LAD: The graft was a sequential LIMA.  Graft angiography showed no evidence of disease.  --  Graft to the 1st diagonal: The graft was a LIMA. Graft angiography  showed no evidence of disease.  --  Graft to the 2nd diagonal: The graft was a saphenous vein graft. Graft  angiography showed no evidence of disease.  --  Graft to the 1st obtuse marginal: The graft was a saphenous vein graft.  Graft angiography showed noevidence of disease.  --  Graft to the RPDA: The graft was a sequential vein graft. Graft  angiography showed no evidence of disease.  COMPLICATIONS: No complications occurred during the cath lab visit.  DIAGNOSTIC IMPRESSIONS: Patent Grafts.  No newdisease.  DIAGNOSTIC RECOMMENDATIONS: Assess for non cardiac causes of chest pain.  Patient has hiatal hernia which could be the etiology. Increase PPI to bid.  Prepared and signed by  Riccardo Erazo MD  Signed 11/06/2018 17:53:43    < end of copied text >        ASSESSMENT AND PLAN:     71 y/o female with medical history DM Type I, CAD s/p PCI CABGX4 (2012), repeat cardiac catheterization (11/2018) secondary to recurrent chest pain with Dr. Erazo which showed patent grafts, Stage III CKD, CVA, Anemia, hypothyroidism, who presents to [facility]ED for sudden onset slurred speech and chest pain during nuclear stress test.  Patient has been c/o SOB/MENEZES and PND. Had an outpatient NST c/b SOB, chest tightness and Hypertensive urgency. Patient is now admitted for management. Given patient's cardiac history and anginal symptoms, and Echo EF 65%  grade 2 diastolic dysfunction, Moderate to severe aortic valve stenosis, she is now scheduled for right and left heart catheterization to evaluate progression of CAD and gradient across aortic valve.       -Patient seen and examined  -Labs and EKG reviewed   -Pre-procedure teaching completed with patient, questions answered   -Informed consent to be obtained by attending   -Pt received Asprin prior to cardiac cath   -pt instructed IF STENT PLACED WILL REQUIRE TO STAY OVERNIGHT FOR MONITORING    -cardiac rehab info provided if stent is placed recommended to start if needed post PCI          Department of Cardiology                                                [facility]/[place] [address] [address] [facility]                                            Telephone: [phone]. Fax:[phone]                                                 Pre Procedure Cardiac Cath NP Note      Narrative:      71 y/o female with medical history DM Type I, CAD s/p PCI CABGX4 (2012), repeat cardiac catheterization (11/2018) secondary to recurrent chest pain with Dr. Erazo which showed patent grafts, Stage III CKD, CVA, Anemia, hypothyroidism, who presents to [facility]ED for sudden onset slurred speech and chest pain during nuclear stress test.  MRI with old left MCA infrct with ischemic changes, MRA neck with extensive atherosclerotic changes Echo EF 65%  grade 2 diastolic dysfunction, Moderate to severe aortic valve stenosis. The Dimensionless Index value is 0.29.  NST with small mild defects in the mid ant anteroseptal walls suggestive of ischemia. Patient presented to [facility] from outpatient setting while undergoing NST c/b RRT. Was found to be hypertensive with change in mental status. Patient c/o band-like tightness across chest associated with SOB. States that for the past 3 months has been having exertional SOB, and PND for the past year. Patient is now scheduled for right and left heart catheterization to evaluate progression of CAD and gradient across aortic valve.       ASA:   3  Mallampati:    2  Bleeding Risk:   6.9%  Creatinine:  1.01  GFR:  56     	  MEDICATIONS:  amLODIPine   Tablet 10 milliGRAM(s) Oral daily  hydrALAZINE 50 milliGRAM(s) Oral every 8 hours  isosorbide   mononitrate ER Tablet (IMDUR) 30 milliGRAM(s) Oral daily  losartan 100 milliGRAM(s) Oral daily  acetaminophen   Tablet .. 650 milliGRAM(s) Oral every 6 hours PRN  gabapentin 100 milliGRAM(s) Oral three times a day  ondansetron Injectable 4 milliGRAM(s) IV Push every 6 hours PRN  senna 1 Tablet(s) Oral at bedtime  atorvastatin 40 milliGRAM(s) Oral at bedtime  dextrose 40% Gel 15 Gram(s) Oral once PRN  dextrose 50% Injectable 12.5 Gram(s) IV Push once  dextrose 50% Injectable 25 Gram(s) IV Push once  dextrose 50% Injectable 25 Gram(s) IV Push once  glucagon  Injectable 1 milliGRAM(s) IntraMuscular once PRN  insulin glargine Injectable (LANTUS) 15 Unit(s) SubCutaneous at bedtime  insulin lispro (ADMELOG) corrective regimen sliding scale   SubCutaneous three times a day before meals  insulin lispro (ADMELOG) corrective regimen sliding scale   SubCutaneous at bedtime  insulin lispro Injectable (ADMELOG) 8 Unit(s) SubCutaneous three times a day before meals  levothyroxine 137 MICROGram(s) Oral daily  aspirin  chewable 81 milliGRAM(s) Oral daily  dextrose 5%. 1000 milliLiter(s) IV Continuous <Continuous>  enoxaparin Injectable 40 milliGRAM(s) SubCutaneous daily        PHYSICAL EXAM:    T(C): 36.7 (10-29-20 @ 15:53), Max: 36.8 (10-28-20 @ 21:47)  HR: 70 (10-29-20 @ 16:46) (70 - 79)  BP: 148/90 (10-29-20 @ 16:46) (122/70 - 182/84)  RR: 17 (10-29-20 @ 15:53) (17 - 19)  SpO2: 93% (10-29-20 @ 15:53) (93% - 99%)      Constitutional: A & O x 3  HEENT:   Normal oral mucosa, PERRL, EOMI	  Cardiovascular: Normal S1 S2, No JVD, No murmurs, No edema  Respiratory: Lungs clear to auscultation	  Gastrointestinal:  Soft, Non-tender, + BS	  Skin: No rashes, No ecchymoses, No cyanosis  Neurologic: Non-focal  Extremities: Normal range of motion, No clubbing, cyanosis or edema  Vascular: Peripheral pulses palpable 2+ bilaterally    TELEMETRY: 	NSR       ECG:  	  < from: 12 Lead ECG (10.23.20 @ 11:33) >  Normal sinus rhythm  Normal ECG    Confirmed by SHARLENE HUNTER ([id number]) on 10/24/2020 12:36:55 PM    < end of copied text >    RADIOLOGY:   DIAGNOSTIC TESTING:  [X ] Echocardiogram:    < from: TTE Echo Complete w/o Contrast w/ Doppler (10.23.20 @ 15:38) >  Summary:   1. Left ventricular ejection fraction, by visual estimation, is 60 to 65%.   2. Normal global left ventricular systolic function.   3. Spectral Doppler shows pseudonormal pattern of left ventricular myocardial filling (Grade II diastolic dysfunction). Elevated mean left atrial pressure.   4. Normal left ventricular internal cavity size.   5. There is mild concentric left ventricular hypertrophy.   6. Normal right ventricular size and function.   7. Moderately enlarged left atrium.   8. Normal right atrial size.   9. Moderate mitral annular calcification.  10. Mild thickening and calcification of the anterior and posterior mitral valve leaflets.  11. Trace mitral valve regurgitation.  12. Moderate to severe aortic valve stenosis. The Dimesionless Index value is 0.29.  13. Mild pulmonic valve regurgitation.  14. Dilatation of the aortic root.  15. There is no evidence of pericardial effusion.    MD Shellie Electronically signed on 10/24/2020 at 11:07:01 AM  *** Final ***    YASMINE MONTALVO MD; Attending Cardiologist  This document has been electronically signed. Oct 23 2020  3:38PM    < end of copied text >    [X ]  Catheterization:    < from: Cardiac Cath Lab - Adult (11.06.18 @ 16:04) >  PROCEDURE:  --  Left heart catheterization.  --  Left coronary angiography.  --  Right coronary angiography.  --  Sonosite.  --  Hemostasis with Mynx.    VENTRICLES: No LV gram was performed; however, a recent echocardiogram  demonstrated an EF of 55 %.  CORONARY VESSELS: The coronary circulation is right dominant.  LM:   --  Distal left main: There was a 50 % stenosis.  LAD:   --Ostial LAD: There was a 80 % stenosis.  --  Proximal LAD: There was a 50 % stenosis in-stent.  --  Mid LAD: There was a 70 % stenosis in-stent.  CX:   --  Proximal circumflex: There was a 70 % stenosis.  RCA:   --  Proximal RCA: There was a 80 % stenosis.  --  Distal RCA: There was a 50 % stenosis.  GRAFTS:   --  Graft to the distal LAD: The graft was a sequential LIMA.  Graft angiography showed no evidence of disease.  --  Graft to the 1st diagonal: The graft was a LIMA. Graft angiography  showed no evidence of disease.  --  Graft to the 2nd diagonal: The graft was a saphenous vein graft. Graft  angiography showed no evidence of disease.  --  Graft to the 1st obtuse marginal: The graft was a saphenous vein graft.  Graft angiography showed noevidence of disease.  --  Graft to the RPDA: The graft was a sequential vein graft. Graft  angiography showed no evidence of disease.  COMPLICATIONS: No complications occurred during the cath lab visit.  DIAGNOSTIC IMPRESSIONS: Patent Grafts.  No newdisease.  DIAGNOSTIC RECOMMENDATIONS: Assess for non cardiac causes of chest pain.  Patient has hiatal hernia which could be the etiology. Increase PPI to bid.  Prepared and signed by  Riccardo Erazo MD  Signed 11/06/2018 17:53:43    < end of copied text >      < from: Nuclear Stress Test-Pharmacologic (10.23.20 @ 09:34) >  IMPRESSIONS:Abnormal Study  * Unable to walk due to covid pandemic precautions  * Chest Pain: No chest pain with administration of  Regadenoson.  * Symptom: No Symptom.  * HR Response: Appropriate.  * BP Response: Appropriate.  * Heart Rhythm: Normal Sinus Rhythm.  * ECG Abnormalities: There were no diagnostic changes.  * Arrhythmia: 1 VPDs occurred.  * As per Stress  lab NP notes: "was Called by nuclear tech  to evaluate patient for elevated heart rate during last  minute of stress pictures. Patients speech was delayed,  NST on monitor. Aminophylline 75mg iv x 1 given, patient  hypertensive hydralzine 10mg IV x1 given. Rapid response  called and patient brought to ED."  * Review of raw data shows: Breast attenuation artifact.,  Diaphragmatic artifact.  * The left ventricle was normal LV size. There are small,  mild defects in mid anterior, mid anteroseptal  walls that  are predominantly reversible, suggestive of ischemia.There  are medium sized, moderate defects in basal and mid  inferior, basal and mid infero-lateral walls that are  minimally reversible suggestive of infarction with  mild  cathy-infarct ischemi  *  Hypokinesis of basal inferior and inferolateral wall.  Poststress LVEF  69%.      Confirmed on  10/23/2020 - 12:04:23 at [facility] by  Devin Limon MD   Cardiology Fellow: Deedee Kumar  ------------------------------------------------------------------------    < end of copied text >    ASSESSMENT AND PLAN:     71 y/o female with medical history DM Type I, CAD s/p PCI CABGX4 (2012), repeat cardiac catheterization (11/2018) secondary to recurrent chest pain with Dr. Erazo which showed patent grafts, Stage III CKD, CVA, Anemia, hypothyroidism, who presents to [facility]ED for sudden onset slurred speech and chest pain during nuclear stress test.  Patient has been c/o SOB/MENEZES and PND. Had an outpatient NST c/b SOB, chest tightness and Hypertensive urgency. Patient is now admitted for management. Given patient's cardiac history and anginal symptoms, and Echo EF 65%  grade 2 diastolic dysfunction, Moderate to severe aortic valve stenosis, she is now scheduled for right and left heart catheterization to evaluate progression of CAD and gradient across aortic valve.       -Patient seen and examined  -Labs and EKG reviewed   -Pre-procedure teaching completed with patient, questions answered   -Informed consent to be obtained by attending   -Pt received Asprin prior to cardiac cath   -pt instructed IF STENT PLACED WILL REQUIRE TO STAY OVERNIGHT FOR MONITORING    -cardiac rehab info provided if stent is placed recommended to start if needed post PCI

## 2020-10-30 ENCOUNTER — TRANSCRIPTION ENCOUNTER (OUTPATIENT)
Age: 70
End: 2020-10-30

## 2020-10-30 LAB
GLUCOSE BLDC GLUCOMTR-MCNC: 211 MG/DL — HIGH (ref 70–99)
GLUCOSE BLDC GLUCOMTR-MCNC: 261 MG/DL — HIGH (ref 70–99)
GLUCOSE BLDC GLUCOMTR-MCNC: 312 MG/DL — HIGH (ref 70–99)
GLUCOSE BLDC GLUCOMTR-MCNC: 83 MG/DL — SIGNIFICANT CHANGE UP (ref 70–99)

## 2020-10-30 PROCEDURE — 99232 SBSQ HOSP IP/OBS MODERATE 35: CPT

## 2020-10-30 RX ORDER — CARVEDILOL PHOSPHATE 80 MG/1
12.5 CAPSULE, EXTENDED RELEASE ORAL EVERY 12 HOURS
Refills: 0 | Status: DISCONTINUED | OUTPATIENT
Start: 2020-10-30 | End: 2020-10-30

## 2020-10-30 RX ADMIN — Medication 8 UNIT(S): at 12:39

## 2020-10-30 RX ADMIN — Medication 50 MILLIGRAM(S): at 06:04

## 2020-10-30 RX ADMIN — Medication 8: at 12:38

## 2020-10-30 RX ADMIN — AMLODIPINE BESYLATE 10 MILLIGRAM(S): 2.5 TABLET ORAL at 06:03

## 2020-10-30 RX ADMIN — Medication 81 MILLIGRAM(S): at 08:26

## 2020-10-30 RX ADMIN — Medication 8 UNIT(S): at 17:24

## 2020-10-30 RX ADMIN — Medication 6: at 17:24

## 2020-10-30 RX ADMIN — INSULIN GLARGINE 15 UNIT(S): 100 INJECTION, SOLUTION SUBCUTANEOUS at 22:21

## 2020-10-30 RX ADMIN — ISOSORBIDE MONONITRATE 30 MILLIGRAM(S): 60 TABLET, EXTENDED RELEASE ORAL at 08:23

## 2020-10-30 RX ADMIN — LOSARTAN POTASSIUM 100 MILLIGRAM(S): 100 TABLET, FILM COATED ORAL at 06:04

## 2020-10-30 RX ADMIN — GABAPENTIN 100 MILLIGRAM(S): 400 CAPSULE ORAL at 12:37

## 2020-10-30 RX ADMIN — Medication 8 UNIT(S): at 08:24

## 2020-10-30 RX ADMIN — ENOXAPARIN SODIUM 40 MILLIGRAM(S): 100 INJECTION SUBCUTANEOUS at 22:22

## 2020-10-30 RX ADMIN — SENNA PLUS 1 TABLET(S): 8.6 TABLET ORAL at 22:22

## 2020-10-30 RX ADMIN — Medication 4: at 08:24

## 2020-10-30 RX ADMIN — Medication 50 MILLIGRAM(S): at 22:22

## 2020-10-30 RX ADMIN — ATORVASTATIN CALCIUM 40 MILLIGRAM(S): 80 TABLET, FILM COATED ORAL at 22:22

## 2020-10-30 RX ADMIN — GABAPENTIN 100 MILLIGRAM(S): 400 CAPSULE ORAL at 22:22

## 2020-10-30 RX ADMIN — Medication 137 MICROGRAM(S): at 06:03

## 2020-10-30 RX ADMIN — Medication 50 MILLIGRAM(S): at 14:00

## 2020-10-30 RX ADMIN — GABAPENTIN 100 MILLIGRAM(S): 400 CAPSULE ORAL at 06:03

## 2020-10-30 NOTE — PROGRESS NOTE ADULT - SUBJECTIVE AND OBJECTIVE BOX
SERGIO PHILLIP  ----------------------------------------  The patient was seen and evaluated for hypertensive encephalopathy. Offers no complaints. Feels well.    Vital Signs Last 24 Hrs  T(C): 36.9 (30 Oct 2020 08:21), Max: 37.3 (30 Oct 2020 06:02)  T(F): 98.5 (30 Oct 2020 08:21), Max: 99.1 (30 Oct 2020 06:02)  HR: 89 (30 Oct 2020 08:21) (70 - 91)  BP: 107/63 (30 Oct 2020 08:21) (107/63 - 194/89)  BP(mean): --  RR: 20 (30 Oct 2020 08:21) (17 - 20)  SpO2: 94% (30 Oct 2020 08:21) (93% - 99%)    CAPILLARY BLOOD GLUCOSE  POCT Blood Glucose.: 211 mg/dL (30 Oct 2020 07:59)  POCT Blood Glucose.: 193 mg/dL (29 Oct 2020 21:06)  POCT Blood Glucose.: 142 mg/dL (29 Oct 2020 16:44)  POCT Blood Glucose.: 163 mg/dL (29 Oct 2020 12:27)    PHYSICAL EXAMINATION:  ----------------------------------------  General appearance: No acute distress, Awake, Alert  HEENT: Normocephalic, Atraumatic, Conjunctiva clear, EOMI  Neck: Supple, No JVD, No tenderness  Lungs: Breath sound equal bilaterally, No wheezes, No rales  Cardiovascular: S1S2, Regular rhythm  Abdomen: Soft, Nontender, Nondistended, No guarding/rebound, Positive bowel sounds  Extremities: No clubbing, No cyanosis, No edema, No calf tenderness  Neuro: Strength equal bilaterally, No tremors  Psychiatric: Appropriate mood, Normal affect    MEDICATIONS  (STANDING):  amLODIPine   Tablet 10 milliGRAM(s) Oral daily  aspirin  chewable 81 milliGRAM(s) Oral daily  atorvastatin 40 milliGRAM(s) Oral at bedtime  dextrose 5%. 1000 milliLiter(s) (50 mL/Hr) IV Continuous <Continuous>  dextrose 50% Injectable 12.5 Gram(s) IV Push once  dextrose 50% Injectable 25 Gram(s) IV Push once  dextrose 50% Injectable 25 Gram(s) IV Push once  enoxaparin Injectable 40 milliGRAM(s) SubCutaneous daily  gabapentin 100 milliGRAM(s) Oral three times a day  hydrALAZINE 50 milliGRAM(s) Oral every 8 hours  insulin glargine Injectable (LANTUS) 15 Unit(s) SubCutaneous at bedtime  insulin lispro (ADMELOG) corrective regimen sliding scale   SubCutaneous three times a day before meals  insulin lispro (ADMELOG) corrective regimen sliding scale   SubCutaneous at bedtime  insulin lispro Injectable (ADMELOG) 8 Unit(s) SubCutaneous three times a day before meals  isosorbide   mononitrate ER Tablet (IMDUR) 30 milliGRAM(s) Oral daily  levothyroxine 137 MICROGram(s) Oral daily  losartan 100 milliGRAM(s) Oral daily  senna 1 Tablet(s) Oral at bedtime    MEDICATIONS  (PRN):  acetaminophen   Tablet .. 650 milliGRAM(s) Oral every 6 hours PRN Temp greater or equal to 38C (100.4F), Mild Pain (1 - 3)  dextrose 40% Gel 15 Gram(s) Oral once PRN Blood Glucose LESS THAN 70 milliGRAM(s)/deciliter  glucagon  Injectable 1 milliGRAM(s) IntraMuscular once PRN Glucose LESS THAN 70 milligrams/deciliter  ondansetron Injectable 4 milliGRAM(s) IV Push every 6 hours PRN Nausea and/or Vomiting      ASSESSMENT / PLAN:  ----------------------------------------  70F with a history of coronary artery disease, CABG, hypertension, diabetes, and hypothyroidism who initially presented to the hospital for a nuclear stress test due to dyspnea on exertion. During the stress test, the patient was noted to have elevated blood pressures(200/110) and difficulty with speech and slow response to questions. A rapid response was activated and the patient transferred to the emergency department for evaluation.     Hypertensive emergency - On amlodipine, hydralazine, isosorbide, and losartan. On telemetry. Discussed with Cardiology, to monitor blood pressures overnight prior to discharge home.    Slurred speech and confusion - CT of the head was without acute intracranial pathology. MRI noted a prior stroke in the left basal ganglia and left thalamus but no acute infarcts.    Chest pain - On aspirin and atorvastatin. Nuclear stress test results noted small mild reversible defects in mid anterior and mid anteroseptal walls as well as medium sized moderate defects in basal, mid inferior, basal and mid infero-lateral walls. Cardiac catheterization completed without intervention, for medical management.    Diabetes - Insulin coverage, close monitoring of blood glucose levels. Neuropathy improved on gabapentin.    Hypothyroidism - On levothyroxine. no

## 2020-10-30 NOTE — DISCHARGE NOTE PROVIDER - NSDCCPCAREPLAN_GEN_ALL_CORE_FT
PRINCIPAL DISCHARGE DIAGNOSIS  Diagnosis: Hypertensive urgency  Assessment and Plan of Treatment: Continue on the current cardiac medications. Follow up with your cardiologist for further management.      SECONDARY DISCHARGE DIAGNOSES  Diagnosis: Diabetes  Assessment and Plan of Treatment: Resume your home diabetes medications. Monitor glucose levels.    Diagnosis: Chest pain  Assessment and Plan of Treatment: Cardiac catheterization noted patent grafts.

## 2020-10-30 NOTE — DISCHARGE NOTE PROVIDER - HOSPITAL COURSE
70F with a three month history of exertional dyspnea presented for an outpatient nuclear stress test and was found to have hypertensive emergency and change in mental status as well as chest discomfort and dyspnea during the test requiring the administration of aminophylline and hydralazine. On presentation, BP(210/91). Nuclear stress test noted small mild defects in mid anterior, mid anteroseptal walls that were predominantly reversible suggestive of ischemia, medium sized moderate defects in basal and mid inferior, basal and mid infero-lateral walls that were minimally reversible suggestive of infarction with mild cathy-infarct ischemia. CT of the head noted encephalomalacia and gliosis in the left corona radiata and basal ganglia, no acute intracranial hemorrhage, mass effect, or extra-axial fluid collection, noted areas of hypodensity in the bilateral hemispheric white matter suggesting white matter microvascular ischemic change. The patient was seen by Neurology in consultation and the symptoms were thought to be unlikely from a stroke. Echocardiogram noted normal global left ventricular systolic function, ejection fraction of 60 to 65%, grade II diastolic dysfunction, moderately enlarged left atrium, no pericardial effusion, moderate to severe aortic stenosis, The patient had an episode of hypotension and sinus bradycardia which was attributed to vagal response due to abdominal discomfort and urge to have a bowel movement. The patient was seen by Cardiology and carvedilol was discontinued. The patient had improvement in the blood pressure. MRI of the brain noted chronic ischemic changes noted in the white matter of both hemispheres, old left basal ganglia infarct, chronic infarct in the left thalamus, no acute ischemia, no space-occupying lesion. MRA of the head noted a chronic left MCA occlusion in the proximal left M1 segment, fairly extensive atherosclerotic changes the in the vertebrobasilar system and both posterior cerebral arteries, atherosclerotic changes also noted in the right middle cerebral and both anterior cerebral arteries. MRA of the neck noted no significant stenosis or occlusion. The patient underwent cardiac catheterization noted patent grafts with recommendations for medical management.      35 minutes total time   70F with a three month history of exertional dyspnea presented for an outpatient nuclear stress test and was found to have hypertensive emergency and change in mental status as well as chest discomfort and dyspnea during the test requiring the administration of aminophylline and hydralazine. On presentation, BP(210/91). Nuclear stress test noted small mild defects in mid anterior, mid anteroseptal walls that were predominantly reversible suggestive of ischemia, medium sized moderate defects in basal and mid inferior, basal and mid infero-lateral walls that were minimally reversible suggestive of infarction with mild cathy-infarct ischemia. CT of the head noted encephalomalacia and gliosis in the left corona radiata and basal ganglia, no acute intracranial hemorrhage, mass effect, or extra-axial fluid collection, noted areas of hypodensity in the bilateral hemispheric white matter suggesting white matter microvascular ischemic change. The patient was seen by Neurology in consultation and the symptoms were thought to be unlikely from a stroke. Echocardiogram noted normal global left ventricular systolic function, ejection fraction of 60 to 65%, grade II diastolic dysfunction, moderately enlarged left atrium, no pericardial effusion, moderate to severe aortic stenosis, The patient had an episode of hypotension and sinus bradycardia which was attributed to vagal response due to abdominal discomfort and urge to have a bowel movement. The patient was seen by Cardiology and carvedilol was discontinued. The patient had improvement in the blood pressure. MRI of the brain noted chronic ischemic changes noted in the white matter of both hemispheres, old left basal ganglia infarct, chronic infarct in the left thalamus, no acute ischemia, no space-occupying lesion. MRA of the head noted a chronic left MCA occlusion in the proximal left M1 segment, fairly extensive atherosclerotic changes the in the vertebrobasilar system and both posterior cerebral arteries, atherosclerotic changes also noted in the right middle cerebral and both anterior cerebral arteries. MRA of the neck noted no significant stenosis or occlusion. The patient underwent cardiac catheterization noted patent grafts with recommendations for medical management.    Vital Signs Last 24 Hrs  T(C): 36.9 (31 Oct 2020 06:05), Max: 36.9 (31 Oct 2020 06:05)  T(F): 98.5 (31 Oct 2020 06:05), Max: 98.5 (31 Oct 2020 06:05)  HR: 73 (31 Oct 2020 06:05) (73 - 81)  BP: 149/73 (31 Oct 2020 06:05) (122/64 - 149/73)  BP(mean): --  RR: 18 (31 Oct 2020 06:05) (18 - 20)  SpO2: 96% (31 Oct 2020 06:05) (94% - 98%)    CONSTITUTIONAL:  NAD  ENMT: moist mucous membranes  EYES: +EOMI  CARDIAC: Regular rate, regular rhythm.  normal +S1, S2. +systolic ejection murmur  RESPIRATORY: Clear to auscultation bilaterally, no wheezes noted  GASTROINTESTINAL: Abdomen soft, non-tender, no guarding.  NEUROLOGICAL: Alert and oriented, no focal deficits, no motor or sensory deficits.  SKIN: warm, dry, no rashes noted      35 minutes total time

## 2020-10-30 NOTE — DISCHARGE NOTE PROVIDER - PROVIDER TOKENS
PROVIDER:[TOKEN:[[id number]]],PROVIDER:[TOKEN:[[id number]]] PROVIDER:[TOKEN:[[id number]]],PROVIDER:[TOKEN:[[id number]],FOLLOWUP:[1 week]]

## 2020-10-30 NOTE — PROGRESS NOTE ADULT - SUBJECTIVE AND OBJECTIVE BOX
[facility]                          [address] [place] [address]                       Phone: [phone]. Fax:[phone]                      ________________________________________________    HPI:  69 yo female with CABG and CAD (PCI in the past), HTN, DM II (on insulin), hypothyroidism who presents to the hospital after arriving for outpatient NST at which she had an RRT. Patient was found to have hypertensive emergency with change in mental status. Patient describes that during event she had band-like tightness across her chest associated with SOB. Patient had CABG in  and last PCI 2 years ago she self reports.     Patient for past 3 months has been having exertional SOB but denies chest pain, diaphoresis, orthopnea, leg swelling. She has been sleeping with 2 pillows at night for past year. Denies fever, chills, cough. Patient saw her cardiologist in follow up recently and was recommended for NST which was today. During NST patient BP was reportedly >200 SBP and >100 DBP. She received adenosine during stress test which she was given reversal with aminophylline. Patient at this time is alert and oriented and feels her normal self. (23 Oct 2020 14:14)    ROS: All review of systems negative unless indicated otherwise below.                          LAB RESULTS                   COMPLETE BLOOD COUNT( 25 Oct 2020 07:04 )                            10.8 g/dL<L>  7.31 K/uL )---------------( 126 K/uL<L>                        34.9 %      Automated Differential     Auto Basophil # - X      Auto Basophil % - X      Auto Eosinophil # - X      Auto Eosinophil % - X      Auto Immature Granulocyte # - X      Auto Immature Granulocyte % - X      Auto Lymphocyte # - X      Auto Lymphocyte % - X      Auto Monocyte # - X      Auto Monocyte % - X      Auto Neutrophil # - X      Auto Neutrophil % - X                                      CHEMISTRY                 Basic Metabolic Panel (10-27-20 @ 06:48)    140  |  105  |  18.0  ----------------------------<  101<H>  3.9   |  23.0  |  1.01    Ca    8.7      27 Oct 2020 06:48  Mg     2.0     10                    Liver Functions (10-23-20 @ 11:58))  TPro  7.2  /  Alb  3.8  /  TBili  0.5  /  DBili  x   /  AST  21  /  ALT  17  /  AlkPhos  147     PT/INR/PTT ( 23 Oct 2020 11:58 )                        :                       :      11.8         :       27.1                  .        .                   .              .           .       1.02        .                                                                 Cardiac Enzymes   ( 23 Oct 2020 11:58 )  Troponin T  0.01 ,  CPK  X    , CKMB  X    , BNP 1313<H>                          RADIOLOGY RESULTS: Personally visualized   < from: Xray Chest 1 View- PORTABLE-Urgent (10.23.20 @ 12:51) >  IMPRESSION: No acute finding or change.    < end of copied text >                            CARDIOLOGY RESULTS: Official Report/Preliminary Verbal Reports    ECHO:   < from: TTE Echo Complete w/o Contrast w/ Doppler (10.23.20 @ 15:38) >  Summary:   1. Left ventricular ejection fraction, by visual estimation, is 60 to 65%.   2. Normal global left ventricular systolic function.   3. Spectral Doppler shows pseudonormal pattern of left ventricular myocardial filling (Grade II diastolic dysfunction). Elevated mean left atrial pressure.   4. Normal left ventricular internal cavity size.   5. There is mild concentric left ventricular hypertrophy.   6. Normal right ventricular size and function.   7. Moderately enlarged left atrium.   8. Normal right atrial size.   9. Moderate mitral annular calcification.  10. Mild thickening and calcification of the anterior and posterior mitral valve leaflets.  11. Trace mitral valve regurgitation.  12. Moderate to severe aortic valve stenosis. The Dimesionless Index value is 0.29.  13. Mild pulmonic valve regurgitation.  14. Dilatation of the aortic root.  15. There is no evidence of pericardial effusion.    MD Shellie Electronically signed on 10/24/2020 at 11:07:01 AM    < end of copied text >    CATH:     < from: Cardiac Cath Lab - Adult (18 @ 16:04) >  VENTRICLES: No LV gram was performed; however, a recent echocardiogram  demonstrated an EF of 55 %.  CORONARY VESSELS: The coronary circulation is right dominant.  LM:   --  Distal left main: There was a 50 % stenosis.  LAD:   --Ostial LAD: There was a 80 % stenosis.  --  Proximal LAD: There was a 50 % stenosis in-stent.  --  Mid LAD: There was a 70 % stenosis in-stent.  CX:   --  Proximal circumflex: There was a 70 % stenosis.  RCA:   --  Proximal RCA: There was a 80 % stenosis.  --  Distal RCA: There was a 50 % stenosis.  GRAFTS:   --  Graft to the distal LAD: The graft was a sequential LIMA.  Graft angiography showed no evidence of disease.  --  Graft to the 1st diagonal: The graft was a LIMA. Graft angiography  showed no evidence of disease.  --  Graft to the 2nd diagonal: The graft was a saphenous vein graft. Graft  angiography showed no evidence of disease.  --  Graft to the 1st obtuse marginal: The graft was a saphenous vein graft.  Graft angiography showed noevidence of disease.  --  Graft to the RPDA: The graft was a sequential vein graft. Graft  angiography showed no evidence of disease.  COMPLICATIONS: No complications occurred during the cath lab visit.  DIAGNOSTIC IMPRESSIONS: Patent Grafts.  No newdisease.  DIAGNOSTIC RECOMMENDATIONS: Assess for non cardiac causes of chest pain.  Patient has hiatal hernia which could be the etiology. Increase PPI to bid.  Prepared and signed by  Riccardo Erazo MD    < end of copied text >                          CARDIOLOGY REVIEW: Personally visualized and reviewed  Telemetry Last 24h: SR  PACs                              DAILY WEIGHTS - 48 HOUR TREND   Daily Weight in k (29 Oct 2020 06:25)    HOME MEDICATIONS:  aspirin 81 mg oral tablet: 1 tab(s) orally once a day (23 Oct 2020 14:56)  atorvastatin 40 mg oral tablet: 1 tab(s) orally once a day (23 Oct 2020 14:56)  carvedilol 12.5 mg oral tablet: 1 tab(s) orally 2 times a day (23 Oct 2020 14:56)  gabapentin 100 mg oral capsule: 1 cap(s) orally 2 times a day (23 Oct 2020 14:56)  hydrALAZINE 50 mg oral tablet: 1 tab(s) orally 2 times a day (23 Oct 2020 14:56)  Lantus 100 units/mL subcutaneous solution: 25 unit(s) subcutaneous once a day (23 Oct 2020 14:56)  levothyroxine 137 mcg (0.137 mg) oral tablet: 1 tab(s) orally once a day (23 Oct 2020 14:56)  losartan 100 mg oral tablet: 1 tab(s) orally once a day (23 Oct 2020 14:56)  NovoLOG 100 units/mL subcutaneous solution: 10 unit(s) subcutaneous 3 times a day (23 Oct 2020 14:56)                             Current Admission Active Medications    acetaminophen   Tablet .. 650 milliGRAM(s) Oral every 6 hours PRN Temp greater or equal to 38C (100.4F), Mild Pain (1 - 3)  amLODIPine   Tablet 10 milliGRAM(s) Oral daily  aspirin  chewable 81 milliGRAM(s) Oral daily  atorvastatin 40 milliGRAM(s) Oral at bedtime  dextrose 40% Gel 15 Gram(s) Oral once PRN Blood Glucose LESS THAN 70 milliGRAM(s)/deciliter  dextrose 5%. 1000 milliLiter(s) (50 mL/Hr) IV Continuous <Continuous>  dextrose 50% Injectable 12.5 Gram(s) IV Push once  dextrose 50% Injectable 25 Gram(s) IV Push once  dextrose 50% Injectable 25 Gram(s) IV Push once  enoxaparin Injectable 40 milliGRAM(s) SubCutaneous daily  gabapentin 100 milliGRAM(s) Oral three times a day  glucagon  Injectable 1 milliGRAM(s) IntraMuscular once PRN Glucose LESS THAN 70 milligrams/deciliter  hydrALAZINE 50 milliGRAM(s) Oral every 8 hours  insulin glargine Injectable (LANTUS) 15 Unit(s) SubCutaneous at bedtime  insulin lispro (ADMELOG) corrective regimen sliding scale   SubCutaneous three times a day before meals  insulin lispro (ADMELOG) corrective regimen sliding scale   SubCutaneous at bedtime  insulin lispro Injectable (ADMELOG) 8 Unit(s) SubCutaneous three times a day before meals  isosorbide   mononitrate ER Tablet (IMDUR) 30 milliGRAM(s) Oral daily  levothyroxine 137 MICROGram(s) Oral daily  losartan 100 milliGRAM(s) Oral daily  ondansetron Injectable 4 milliGRAM(s) IV Push every 6 hours PRN Nausea and/or Vomiting  senna 1 Tablet(s) Oral at bedtime                        PHYSICAL EXAM:    Vital Signs Last 24 Hrs  T(C): 36.9 (30 Oct 2020 08:21), Max: 37.3 (30 Oct 2020 06:02)  T(F): 98.5 (30 Oct 2020 08:21), Max: 99.1 (30 Oct 2020 06:02)  HR: 79 (30 Oct 2020 12:35) (70 - 91)  BP: 129/68 (30 Oct 2020 12:35) (107/63 - 194/89)  BP(mean): --  RR: 20 (30 Oct 2020 08:21) (17 - 20)  SpO2: 94% (30 Oct 2020 08:21) (93% - 99%)    GENERAL: NAD  NECK: Supple, No JVD  NERVOUS SYSTEM:  Alert & Oriented X3, non focal neuro exam.   CHEST/LUNG: clear lungs, No rales, rhonchi, wheezing, or rubs  HEART: Regular rate and rhythm; s1 and s2 auscultated, No murmurs, rubs, or gallops  ABDOMEN: Soft, Nontender, Nondistended; Bowel sounds present and normoactive.   EXTREMITIES:  2+ Peripheral Pulses, No clubbing, cyanosis, or edema  CATH SITE: Right groin benign s/p cath.  No bleeding, no ecchymosis, no hematoma. Extremity Warm to touch, with palpable distal pulses, and brisk capillary refill.

## 2020-10-30 NOTE — DIETITIAN INITIAL EVALUATION ADULT. - OTHER INFO
70F with a history of coronary artery disease, CABG, hypertension, diabetes, and hypothyroidism who initially presented to the hospital for a nuclear stress test due to dyspnea on exertion. During the stress test, the patient was noted to have elevated blood pressures(200/110) and difficulty with speech and slow response to questions. A rapid response was activated and the patient transferred to the emergency department for evaluation. Pt continues with good po intake. Denied recent wt changes. Pt provided with written and verbal nutrition education. Pt verbalized understanding. HgbA1c 9.2% noted. Last documented BM 10/29.

## 2020-10-30 NOTE — PROGRESS NOTE ADULT - ASSESSMENT
69 y/o female with medical history DM Type I, CAD s/p PCI CABGX4, Stage III CKD, CVA, Anemia, hypothyroidism, who presents to [facility]ED for sudden onset slurred speech and chest pain during nuclear stress test.     Nuclear stress test: The left ventricle was normal LV size. There are small, mild defects in mid anterior, mid anteroseptal  walls that are predominantly reversible, suggestive of ischemia.There are medium sized, moderate defects in basal and mid inferior, basal and mid infero-lateral walls that are minimally reversible suggestive of infarction with  mild cathy-infarct ischemic *  Hypokinesis of basal inferior and inferolateral wall. Poststress LVEF  69%.  TTE:  1. Left ventricular ejection fraction, by visual estimation, is 60 to 65%. Moderate to severe aortic valve stenosis. The Dimensionless Index value is 0.29. Mild pulmonic valve regurgitation. Dilatation of the aortic root.    10/30 - now s/p LHC showing patent grafts.     CAD s/p CABG   - now s/p LHC showing patent grafts  - Echo with normal LVEF and moderate AS   - patient asymptomatic now   - ok to increase imdur as long as BP can tolerate  - continue ASA, imdur, losartan, lipitor   - coreg on hold 2/2 to previous vasovagal   - Diet/lifestyle modifications and medication compliance heavily reinforced   - cardiac rehab info provided/referral and communication to cardiac rehab completed   - Patient educated about groin site care, signs and symptoms of complication post procedure, and plan of care moving forward. Patient verbalized understanding with teach-back.      HTN Urgency    - increase imdur to 60 daily   - hold coreg  - continue losartan, amlodipine, hydralazine  - continue with home blood pressure regimen   - BP better improved today, continue current regimen.   - if stable BP tomorrow likely DC    Moderate to Severe AS, DI 0.29  - gradients consistent w/ moderate AS     TIA   - ( w/ slurred speech) prior h/o CVA in 2004 2/2 hypertensive encephalopathy   - currently baseline mental status   - MRI shows old L MCA infarct and no acute ischemic changes   - No further work up from Neurology's perspective   - continue with ASA and Lipitor    DVT ppx  - Hep SQ     S/p coronary angiogram   - Patient educated about groin site care, signs and symptoms of complication post procedure, and plan of care moving forward. Patient verbalized understanding with teach-back.   -  patient not a candidate for cardiac rehab secondary to: no intervention     Disposition  - monitor on current regiment of BP meds overnight, if stable ok DC in AM   - follow up with Adelina MELTON within 1-2 weeks 69 y/o female with medical history DM Type I, CAD s/p PCI CABGX4, Stage III CKD, CVA, Anemia, hypothyroidism, who presents to [facility]-ED for sudden onset slurred speech and chest pain during nuclear stress test.     Nuclear stress test: The left ventricle was normal LV size. There are small, mild defects in mid anterior, mid anteroseptal  walls that are predominantly reversible, suggestive of ischemia.There are medium sized, moderate defects in basal and mid inferior, basal and mid infero-lateral walls that are minimally reversible suggestive of infarction with  mild cathy-infarct ischemic *  Hypokinesis of basal inferior and inferolateral wall. Poststress LVEF  69%.  TTE:  1. Left ventricular ejection fraction, by visual estimation, is 60 to 65%. Moderate to severe aortic valve stenosis. The Dimensionless Index value is 0.29. Mild pulmonic valve regurgitation. Dilatation of the aortic root.    10/30 - now s/p LHC showing patent grafts.     CAD s/p CABG   - now s/p LHC showing patent grafts  - Echo with normal LVEF and moderate AS   - patient asymptomatic now   - ok to increase imdur as long as BP can tolerate  - continue ASA, imdur, losartan, lipitor   - coreg on hold 2/2 to previous vasovagal   - Diet/lifestyle modifications and medication compliance heavily reinforced   - cardiac rehab info provided/referral and communication to cardiac rehab completed   - Patient educated about groin site care, signs and symptoms of complication post procedure, and plan of care moving forward. Patient verbalized understanding with teach-back.      HTN Urgency    - increase imdur to 60 daily   - hold coreg  - continue losartan, amlodipine, hydralazine  - continue with home blood pressure regimen   - BP better improved today, continue current regimen.   - if stable BP tomorrow likely DC    Moderate AS (ALISSA via cath 1.25 w/ Mean gradient 39 mmHg)  - gradients consistent w/ moderate AS   - recommend follow up echocardiogram in 6 months to 1 yr     TIA   - ( w/ slurred speech) prior h/o CVA in 2004 2/2 hypertensive encephalopathy   - currently baseline mental status   - MRI shows old L MCA infarct and no acute ischemic changes   - No further work up from Neurology's perspective   - continue with ASA and Lipitor    DVT ppx  - Hep SQ     S/p coronary angiogram   - Patient educated about groin site care, signs and symptoms of complication post procedure, and plan of care moving forward. Patient verbalized understanding with teach-back.   -  patient not a candidate for cardiac rehab secondary to: no intervention   - increase antianginal therapy, Increase Imdur to 60mg po q daily   - in the outpatient setting consider starting Lopressor     Disposition  - monitor on current regiment of BP meds overnight, if stable ok DC in AM   - follow up with Andrea MELTON @ [facility] within 1-2 weeks

## 2020-10-30 NOTE — DISCHARGE NOTE PROVIDER - CARE PROVIDER_API CALL
Carolina Serrano  FAMILY MEDICINE  [address] [place] [address]  Phone: [phone]  Fax: [phone]  Follow Up Time:     Devin Limon  CARDIOVASCULAR DISEASE  [address] [facility], [place] [address]  Phone: [phone]  Fax: [phone]  Follow Up Time:    Carolina Serrano  FAMILY MEDICINE  [address] [place] [address]  Phone: [phone]  Fax: [phone]  Follow Up Time:     Devin Limon  CARDIOVASCULAR DISEASE  [address] [facility], [place] [address]  Phone: [phone]  Fax: [phone]  Follow Up Time: 1 week

## 2020-10-30 NOTE — DISCHARGE NOTE PROVIDER - NSDCMRMEDTOKEN_GEN_ALL_CORE_FT
.: Rolling Walker    ICD 10: E11.9  aspirin 81 mg oral tablet: 1 tab(s) orally once a day  atorvastatin 40 mg oral tablet: 1 tab(s) orally once a day  carvedilol 12.5 mg oral tablet: 1 tab(s) orally 2 times a day  gabapentin 100 mg oral capsule: 1 cap(s) orally 2 times a day  hydrALAZINE 50 mg oral tablet: 1 tab(s) orally 2 times a day  Lantus 100 units/mL subcutaneous solution: 25 unit(s) subcutaneous once a day  levothyroxine 137 mcg (0.137 mg) oral tablet: 1 tab(s) orally once a day  losartan 100 mg oral tablet: 1 tab(s) orally once a day  NovoLOG 100 units/mL subcutaneous solution: 10 unit(s) subcutaneous 3 times a day   .: Rolling Walker    ICD 10: E11.9  amLODIPine 10 mg oral tablet: 1 tab(s) orally once a day  aspirin 81 mg oral tablet: 1 tab(s) orally once a day  atorvastatin 40 mg oral tablet: 1 tab(s) orally once a day  gabapentin 100 mg oral capsule: 1 cap(s) orally 2 times a day  hydrALAZINE 50 mg oral tablet: 1 tab(s) orally every 8 hours  isosorbide mononitrate 30 mg oral tablet, extended release: 1 tab(s) orally once a day  Lantus 100 units/mL subcutaneous solution: 25 unit(s) subcutaneous once a day  levothyroxine 137 mcg (0.137 mg) oral tablet: 1 tab(s) orally once a day  losartan 100 mg oral tablet: 1 tab(s) orally once a day  NovoLOG 100 units/mL subcutaneous solution: 10 unit(s) subcutaneous 3 times a day

## 2020-10-31 VITALS
SYSTOLIC BLOOD PRESSURE: 126 MMHG | OXYGEN SATURATION: 98 % | TEMPERATURE: 98 F | RESPIRATION RATE: 18 BRPM | HEART RATE: 68 BPM | DIASTOLIC BLOOD PRESSURE: 73 MMHG

## 2020-10-31 LAB
GLUCOSE BLDC GLUCOMTR-MCNC: 111 MG/DL — HIGH (ref 70–99)
GLUCOSE BLDC GLUCOMTR-MCNC: 95 MG/DL — SIGNIFICANT CHANGE UP (ref 70–99)

## 2020-10-31 PROCEDURE — 82962 GLUCOSE BLOOD TEST: CPT

## 2020-10-31 PROCEDURE — C1887: CPT

## 2020-10-31 PROCEDURE — 84484 ASSAY OF TROPONIN QUANT: CPT

## 2020-10-31 PROCEDURE — 83690 ASSAY OF LIPASE: CPT

## 2020-10-31 PROCEDURE — 70450 CT HEAD/BRAIN W/O DYE: CPT

## 2020-10-31 PROCEDURE — 85027 COMPLETE CBC AUTOMATED: CPT

## 2020-10-31 PROCEDURE — 80053 COMPREHEN METABOLIC PANEL: CPT

## 2020-10-31 PROCEDURE — 97163 PT EVAL HIGH COMPLEX 45 MIN: CPT

## 2020-10-31 PROCEDURE — 99153 MOD SED SAME PHYS/QHP EA: CPT

## 2020-10-31 PROCEDURE — 95819 EEG AWAKE AND ASLEEP: CPT

## 2020-10-31 PROCEDURE — 93005 ELECTROCARDIOGRAM TRACING: CPT

## 2020-10-31 PROCEDURE — 86769 SARS-COV-2 COVID-19 ANTIBODY: CPT

## 2020-10-31 PROCEDURE — 99152 MOD SED SAME PHYS/QHP 5/>YRS: CPT

## 2020-10-31 PROCEDURE — 97116 GAIT TRAINING THERAPY: CPT

## 2020-10-31 PROCEDURE — 80048 BASIC METABOLIC PNL TOTAL CA: CPT

## 2020-10-31 PROCEDURE — 85730 THROMBOPLASTIN TIME PARTIAL: CPT

## 2020-10-31 PROCEDURE — 83735 ASSAY OF MAGNESIUM: CPT

## 2020-10-31 PROCEDURE — C1769: CPT

## 2020-10-31 PROCEDURE — C1760: CPT

## 2020-10-31 PROCEDURE — 70547 MR ANGIOGRAPHY NECK W/O DYE: CPT

## 2020-10-31 PROCEDURE — 99239 HOSP IP/OBS DSCHRG MGMT >30: CPT

## 2020-10-31 PROCEDURE — 97530 THERAPEUTIC ACTIVITIES: CPT

## 2020-10-31 PROCEDURE — 80061 LIPID PANEL: CPT

## 2020-10-31 PROCEDURE — 83880 ASSAY OF NATRIURETIC PEPTIDE: CPT

## 2020-10-31 PROCEDURE — 99285 EMERGENCY DEPT VISIT HI MDM: CPT

## 2020-10-31 PROCEDURE — C1889: CPT

## 2020-10-31 PROCEDURE — 85610 PROTHROMBIN TIME: CPT

## 2020-10-31 PROCEDURE — 70544 MR ANGIOGRAPHY HEAD W/O DYE: CPT

## 2020-10-31 PROCEDURE — 70551 MRI BRAIN STEM W/O DYE: CPT

## 2020-10-31 PROCEDURE — C1894: CPT

## 2020-10-31 PROCEDURE — 83036 HEMOGLOBIN GLYCOSYLATED A1C: CPT

## 2020-10-31 PROCEDURE — 93306 TTE W/DOPPLER COMPLETE: CPT

## 2020-10-31 PROCEDURE — 36415 COLL VENOUS BLD VENIPUNCTURE: CPT

## 2020-10-31 PROCEDURE — 93461 R&L HRT ART/VENTRICLE ANGIO: CPT

## 2020-10-31 PROCEDURE — 71045 X-RAY EXAM CHEST 1 VIEW: CPT

## 2020-10-31 PROCEDURE — 86803 HEPATITIS C AB TEST: CPT

## 2020-10-31 PROCEDURE — 85025 COMPLETE CBC W/AUTO DIFF WBC: CPT

## 2020-10-31 PROCEDURE — U0003: CPT

## 2020-10-31 RX ORDER — HYDRALAZINE HCL 50 MG
1 TABLET ORAL
Qty: 0 | Refills: 0 | DISCHARGE
Start: 2020-10-31

## 2020-10-31 RX ORDER — ISOSORBIDE MONONITRATE 60 MG/1
1 TABLET, EXTENDED RELEASE ORAL
Qty: 30 | Refills: 0
Start: 2020-10-31 | End: 2020-11-29

## 2020-10-31 RX ORDER — HYDRALAZINE HCL 50 MG
1 TABLET ORAL
Qty: 0 | Refills: 0 | DISCHARGE

## 2020-10-31 RX ORDER — AMLODIPINE BESYLATE 2.5 MG/1
1 TABLET ORAL
Qty: 30 | Refills: 0
Start: 2020-10-31 | End: 2020-11-29

## 2020-10-31 RX ORDER — CARVEDILOL PHOSPHATE 80 MG/1
1 CAPSULE, EXTENDED RELEASE ORAL
Qty: 0 | Refills: 0 | DISCHARGE

## 2020-10-31 RX ADMIN — Medication 81 MILLIGRAM(S): at 08:52

## 2020-10-31 RX ADMIN — Medication 8 UNIT(S): at 12:29

## 2020-10-31 RX ADMIN — Medication 137 MICROGRAM(S): at 06:06

## 2020-10-31 RX ADMIN — AMLODIPINE BESYLATE 10 MILLIGRAM(S): 2.5 TABLET ORAL at 06:06

## 2020-10-31 RX ADMIN — Medication 8 UNIT(S): at 08:50

## 2020-10-31 RX ADMIN — GABAPENTIN 100 MILLIGRAM(S): 400 CAPSULE ORAL at 06:06

## 2020-10-31 RX ADMIN — ISOSORBIDE MONONITRATE 30 MILLIGRAM(S): 60 TABLET, EXTENDED RELEASE ORAL at 08:50

## 2020-10-31 RX ADMIN — Medication 50 MILLIGRAM(S): at 06:07

## 2020-10-31 RX ADMIN — LOSARTAN POTASSIUM 100 MILLIGRAM(S): 100 TABLET, FILM COATED ORAL at 06:06

## 2020-12-25 ENCOUNTER — APPOINTMENT (OUTPATIENT)
Dept: DISASTER EMERGENCY | Facility: CLINIC | Age: 70
End: 2020-12-25

## 2020-12-25 DIAGNOSIS — Z01.818 ENCOUNTER FOR OTHER PREPROCEDURAL EXAMINATION: ICD-10-CM

## 2020-12-25 RX ORDER — CHLORHEXIDINE GLUCONATE 213 G/1000ML
1 SOLUTION TOPICAL ONCE
Refills: 0 | Status: DISCONTINUED | OUTPATIENT
Start: 2020-12-28 | End: 2021-01-11

## 2020-12-26 LAB — SARS-COV-2 N GENE NPH QL NAA+PROBE: NOT DETECTED

## 2020-12-28 ENCOUNTER — TRANSCRIPTION ENCOUNTER (OUTPATIENT)
Age: 70
End: 2020-12-28

## 2020-12-28 ENCOUNTER — OUTPATIENT (OUTPATIENT)
Dept: OUTPATIENT SERVICES | Facility: HOSPITAL | Age: 70
LOS: 1 days | End: 2020-12-28
Payer: MEDICARE

## 2020-12-28 VITALS — WEIGHT: 190.04 LBS | HEIGHT: 62 IN

## 2020-12-28 VITALS
SYSTOLIC BLOOD PRESSURE: 174 MMHG | RESPIRATION RATE: 18 BRPM | DIASTOLIC BLOOD PRESSURE: 70 MMHG | HEART RATE: 84 BPM | OXYGEN SATURATION: 100 %

## 2020-12-28 DIAGNOSIS — Z98.89 OTHER SPECIFIED POSTPROCEDURAL STATES: Chronic | ICD-10-CM

## 2020-12-28 DIAGNOSIS — Z98.890 OTHER SPECIFIED POSTPROCEDURAL STATES: Chronic | ICD-10-CM

## 2020-12-28 DIAGNOSIS — Z98.49 CATARACT EXTRACTION STATUS, UNSPECIFIED EYE: Chronic | ICD-10-CM

## 2020-12-28 DIAGNOSIS — Z90.10 ACQUIRED ABSENCE OF UNSPECIFIED BREAST AND NIPPLE: Chronic | ICD-10-CM

## 2020-12-28 DIAGNOSIS — Z90.710 ACQUIRED ABSENCE OF BOTH CERVIX AND UTERUS: Chronic | ICD-10-CM

## 2020-12-28 DIAGNOSIS — Z95.1 PRESENCE OF AORTOCORONARY BYPASS GRAFT: Chronic | ICD-10-CM

## 2020-12-28 DIAGNOSIS — I73.9 PERIPHERAL VASCULAR DISEASE, UNSPECIFIED: ICD-10-CM

## 2020-12-28 LAB
ANION GAP SERPL CALC-SCNC: 11 MMOL/L — SIGNIFICANT CHANGE UP (ref 5–17)
APTT BLD: 34 SEC — SIGNIFICANT CHANGE UP (ref 27.5–35.5)
BASOPHILS # BLD AUTO: 0.03 K/UL — SIGNIFICANT CHANGE UP (ref 0–0.2)
BASOPHILS NFR BLD AUTO: 0.4 % — SIGNIFICANT CHANGE UP (ref 0–2)
BLD GP AB SCN SERPL QL: SIGNIFICANT CHANGE UP
BUN SERPL-MCNC: 22 MG/DL — HIGH (ref 8–20)
CALCIUM SERPL-MCNC: 9.5 MG/DL — SIGNIFICANT CHANGE UP (ref 8.6–10.2)
CHLORIDE SERPL-SCNC: 103 MMOL/L — SIGNIFICANT CHANGE UP (ref 98–107)
CO2 SERPL-SCNC: 23 MMOL/L — SIGNIFICANT CHANGE UP (ref 22–29)
CREAT SERPL-MCNC: 0.82 MG/DL — SIGNIFICANT CHANGE UP (ref 0.5–1.3)
EOSINOPHIL # BLD AUTO: 0.05 K/UL — SIGNIFICANT CHANGE UP (ref 0–0.5)
EOSINOPHIL NFR BLD AUTO: 0.7 % — SIGNIFICANT CHANGE UP (ref 0–6)
GLUCOSE SERPL-MCNC: 226 MG/DL — HIGH (ref 70–99)
HCT VFR BLD CALC: 37 % — SIGNIFICANT CHANGE UP (ref 34.5–45)
HGB BLD-MCNC: 11.8 G/DL — SIGNIFICANT CHANGE UP (ref 11.5–15.5)
IMM GRANULOCYTES NFR BLD AUTO: 0.1 % — SIGNIFICANT CHANGE UP (ref 0–1.5)
INR BLD: 0.97 RATIO — SIGNIFICANT CHANGE UP (ref 0.88–1.16)
LYMPHOCYTES # BLD AUTO: 1.31 K/UL — SIGNIFICANT CHANGE UP (ref 1–3.3)
LYMPHOCYTES # BLD AUTO: 19 % — SIGNIFICANT CHANGE UP (ref 13–44)
MAGNESIUM SERPL-MCNC: 1.9 MG/DL — SIGNIFICANT CHANGE UP (ref 1.6–2.6)
MCHC RBC-ENTMCNC: 28.4 PG — SIGNIFICANT CHANGE UP (ref 27–34)
MCHC RBC-ENTMCNC: 31.9 GM/DL — LOW (ref 32–36)
MCV RBC AUTO: 89.2 FL — SIGNIFICANT CHANGE UP (ref 80–100)
MONOCYTES # BLD AUTO: 0.54 K/UL — SIGNIFICANT CHANGE UP (ref 0–0.9)
MONOCYTES NFR BLD AUTO: 7.8 % — SIGNIFICANT CHANGE UP (ref 2–14)
NEUTROPHILS # BLD AUTO: 4.96 K/UL — SIGNIFICANT CHANGE UP (ref 1.8–7.4)
NEUTROPHILS NFR BLD AUTO: 72 % — SIGNIFICANT CHANGE UP (ref 43–77)
PLATELET # BLD AUTO: 151 K/UL — SIGNIFICANT CHANGE UP (ref 150–400)
POTASSIUM SERPL-MCNC: 4.5 MMOL/L — SIGNIFICANT CHANGE UP (ref 3.5–5.3)
POTASSIUM SERPL-SCNC: 4.5 MMOL/L — SIGNIFICANT CHANGE UP (ref 3.5–5.3)
PROTHROM AB SERPL-ACNC: 11.3 SEC — SIGNIFICANT CHANGE UP (ref 10.6–13.6)
RBC # BLD: 4.15 M/UL — SIGNIFICANT CHANGE UP (ref 3.8–5.2)
RBC # FLD: 14 % — SIGNIFICANT CHANGE UP (ref 10.3–14.5)
SODIUM SERPL-SCNC: 136 MMOL/L — SIGNIFICANT CHANGE UP (ref 135–145)
WBC # BLD: 6.9 K/UL — SIGNIFICANT CHANGE UP (ref 3.8–10.5)
WBC # FLD AUTO: 6.9 K/UL — SIGNIFICANT CHANGE UP (ref 3.8–10.5)

## 2020-12-28 PROCEDURE — 93010 ELECTROCARDIOGRAM REPORT: CPT

## 2020-12-28 RX ORDER — LOSARTAN POTASSIUM 100 MG/1
50 TABLET, FILM COATED ORAL ONCE
Refills: 0 | Status: COMPLETED | OUTPATIENT
Start: 2020-12-28 | End: 2020-12-28

## 2020-12-28 RX ORDER — ASPIRIN/CALCIUM CARB/MAGNESIUM 324 MG
81 TABLET ORAL ONCE
Refills: 0 | Status: COMPLETED | OUTPATIENT
Start: 2020-12-28 | End: 2020-12-28

## 2020-12-28 RX ORDER — HYDRALAZINE HCL 50 MG
50 TABLET ORAL ONCE
Refills: 0 | Status: COMPLETED | OUTPATIENT
Start: 2020-12-28 | End: 2020-12-28

## 2020-12-28 RX ORDER — AMLODIPINE BESYLATE 2.5 MG/1
10 TABLET ORAL ONCE
Refills: 0 | Status: COMPLETED | OUTPATIENT
Start: 2020-12-28 | End: 2020-12-28

## 2020-12-28 RX ADMIN — LOSARTAN POTASSIUM 50 MILLIGRAM(S): 100 TABLET, FILM COATED ORAL at 09:40

## 2020-12-28 RX ADMIN — AMLODIPINE BESYLATE 10 MILLIGRAM(S): 2.5 TABLET ORAL at 10:04

## 2020-12-28 RX ADMIN — Medication 81 MILLIGRAM(S): at 09:40

## 2020-12-28 RX ADMIN — Medication 50 MILLIGRAM(S): at 09:40

## 2020-12-28 NOTE — H&P PST ADULT - NSICDXPASTMEDICALHX_GEN_ALL_CORE_FT
PAST MEDICAL HISTORY:  Breast cancer     Coronary artery disease     CVA (cerebral vascular accident) R weakness    Diabetes mellitus     Glaucoma     H/O diabetic retinopathy vision loss L eye    Hiatal hernia     HTN (hypertension)     Hypercholesterolemia     Hypothyroid      PAST MEDICAL HISTORY:  Anemia, unspecified type     Blind left eye     Breast cancer Chemo and radiation    Cerebrovascular accident (CVA) due to embolism of left middle cerebral artery     Coronary artery disease involving native coronary artery of native heart with unstable angina pector     Essential hypertension     Gastroesophageal reflux disease with esophagitis, unspecified whether hemorrhage     Glaucoma     H/O diabetic retinopathy vision loss L eye    Heart failure with preserved ejection fraction     Hiatal hernia     History of myocardial infarction     Hypercholesterolemia     Hypothyroid     Microalbuminuria     Stage 3 chronic kidney disease, unspecified whether stage 3a or 3b CKD     Transient ischemic attack     Type 1 diabetes mellitus with diabetic neuropathy

## 2020-12-28 NOTE — DISCHARGE NOTE NURSING/CASE MANAGEMENT/SOCIAL WORK - PATIENT PORTAL LINK FT
You can access the FollowMyHealth Patient Portal offered by [facility] by registering at the following website: [email or web address]. By joining [facility]’s FollowMyHealth portal, you will also be able to view your health information using other applications (apps) compatible with our system.

## 2020-12-28 NOTE — H&P PST ADULT - PRIMARY CARE PROVIDER
Carolina Serrano ([facility], [address] [place] [address], [place] [address], [phone])

## 2020-12-28 NOTE — DISCHARGE NOTE PROVIDER - PROVIDER TOKENS
FREE:[LAST:[Braxton],FIRST:[Devin],PHONE:[phone]],FAX:[phone]   [phone]    -],ADDRESS:[[place] [address]],FOLLOWUP:[2 weeks],ESTABLISHEDPATIENT:[T]]

## 2020-12-28 NOTE — H&P PST ADULT - OTHER CARE PROVIDERS
Dr. Limon Devin Limon ([facility], [address] [place] [address], [place] [address], [phone])

## 2020-12-28 NOTE — DISCHARGE NOTE PROVIDER - CARE PROVIDER_API CALL
Devin Limon  [facility]  [address] [place] [address]  Phone: [phone]  Fax: (   )    -  Established Patient  Follow Up Time: 2 weeks

## 2020-12-28 NOTE — DISCHARGE NOTE PROVIDER - INSTRUCTIONS
With a diagnosis of diabetes comes a strict diet regimen to help control blood sugars by watching carbohydrate consumption. Carbohydrates, such as starches, fruits, dairy and starchy vegetables, is the food group that affects glucose levels in the body. Carefully monitoring carbohydrate intake is a main component of the diabetic diet; eating three meals each day that are roughly equivalent in size can help control blood sugars. A registered dietitian can help you plan a diet customized to your individual needs.

## 2020-12-28 NOTE — H&P PST ADULT - NSICDXPASTSURGICALHX_GEN_ALL_CORE_FT
PAST SURGICAL HISTORY:  S/P appendectomy     S/P breast reconstruction, bilateral     S/P CABG x 4     S/P cardiac cath with stents    S/P hernia repair hiatal    S/P mastectomy     S/P tonsillectomy      PAST SURGICAL HISTORY:  History of repair of hiatal hernia     S/P appendectomy     S/P breast reconstruction, bilateral     S/P CABG x 4 LIMA to the LAD, SVG to the RI, sequential SVG to the OM1 and dRCA, 2013 @ [facility]    S/P cardiac cath with stents    S/P laparoscopic hysterectomy     S/P mastectomy     S/P tonsillectomy     Status post cataract extraction and insertion of intraocular lens, unspecified laterality

## 2020-12-28 NOTE — ASU PATIENT PROFILE, ADULT - VISION (WITH CORRECTIVE LENSES IF THE PATIENT USUALLY WEARS THEM):
wears glasses blind in left eye/Partially impaired: cannot see medication labels or newsprint, but can see obstacles in path, and the surrounding layout; can count fingers at arm's length

## 2020-12-28 NOTE — H&P PST ADULT - HISTORY OF PRESENT ILLNESS
69 y/o female with medical history DM Type I, CAD s/p PCI CABGX4 (2013 at [facility]) LIMA, SVG to RI, sequential vein graft to OM and PDA, previous catheterization (2017) showing patent grafts, Stage III CKD, CVA (2004), Anemia, hypothyroidism, who presents to [facility] for sudden onset slurred speech and chest pain during nuclear stress test, subsequently underwent left heart catheterization (10/23/20) which showed severe native vessel disease; patent LIMA-LAD, SVG-RI and sequential SVG-OM1 and dRCA. milt pHTN PCWP 11. Moderate AS 1.25 cm2 for which medical management was recommended. Echocardiogram on 10/23/20 showed LVEF 60-65% mil cLVH, moderate to severe AS.    69 y/o female with medical history DM Type I, CAD s/p PCI CABGX4 (2013 at [facility]) LIMA, SVG to RI, sequential vein graft to OM and PDA, previous catheterization (2017) showing patent grafts, Stage III CKD, CVA (2004), Anemia, hypothyroidism, who presents to [facility]-ED for sudden onset slurred speech and chest pain during nuclear stress test, subsequently underwent left heart catheterization (10/23/20) which showed severe native vessel disease; patent LIMA-LAD, SVG-RI and sequential SVG-OM1 and dRCA. milt pHTN PCWP 11. Moderate AS 1.25 cm2 for which medical management was recommended. Echocardiogram on 10/23/20 showed LVEF 60-65% mil cLVH, moderate to severe AS. Patient with dyspnea now presenting for left heart catheterization to evaluate progression of CAD.    71 y/o female with medical history DM Type I, CAD s/p PCI CABGX4 (2013 at [facility]) LIMA, SVG to RI, sequential vein graft to OM and PDA, previous catheterization (2017) showing patent grafts, Stage III CKD, CVA (2004), Anemia, hypothyroidism, who presents to [facility]ED for sudden onset slurred speech and chest pain during nuclear stress test, subsequently underwent left heart catheterization (10/23/20) which showed severe native vessel disease; patent LIMA-LAD, SVG-RI and sequential SVG-OM1 and dRCA. milt pHTN PCWP 11. Moderate AS 1.25 cm2 for which medical management was recommended. Echocardiogram on 10/23/20 showed LVEF 60-65% mil cLVH, moderate to severe AS. Patient with dyspnea now presenting for left heart catheterization to evaluate progression of CAD.     ASA  Mallampati  GFR  Creat  Bleeding Risk  COVID-19      Symptoms:        Angina (Class):        Ischemic Symptoms:     Heart Failure:        Systolic/Diastolic/Combined:        NYHA Class (within 2 weeks):     Assessment of LVEF (Must be within 6 months):       EF:  60-65%        Assessed by:   Echocardiogram       Date:    10/2020     Prior Cardiac Interventions (LHC, stents, CABG):       PCI's (Date, Stents, Vessels):   CAD s/p PCI CABGX4 (2013 at [facility]) LIMA, SVG to RI, sequential vein graft to OM and PDA, previous catheterization (2017) showing patent grafts       CABG (Date, Grafts):   CABG x4 (2004)     Noninvasive Testing:   Stress Test: Date:   10/2020        Protocol:        Duration of Exercise:         Symptoms:        EKG Changes:  No ekg changes        DTS:        Myocardial Imaging:  The left ventricle was normal LV size. There are small,  mild defects in mid anterior, mid anteroseptal  walls that  are predominantly reversible, suggestive of ischemia.There  are medium sized, moderate defects in basal and mid  inferior, basal and mid infero-lateral walls that are  minimally reversible suggestive of infarction with  mild  cathy-infarct ischemi  *  Hypokinesis of basal inferior and inferolateral wall.  Poststress LVEF  69%.      Antianginal Therapies:        Beta Blockers:         Calcium Channel Blockers:        Long Acting Nitrates:        Ranexa:     Associated Risk Factors:        Cerebrovascular Disease: N/A       Chronic Lung Disease: N/A       Peripheral Arterial Disease: N/A       Chronic Kidney Disease (if yes, what is GFR): N/A       Uncontrolled Diabetes (if yes, what is HgbA1C or FBS): N/A       Poorly Controlled Hypertension (if yes, what is SBP): N/A       Morbid Obesity (if yes, what is BMI): N/A       History of Recent Ventricular Arrhythmia: N/A       Inability to Ambulate Safely: N/A       Need for Therapeutic Anticoagulation: N/A       Antiplatelet or Contrast Allergy: N/A 69 y/o female former smoker with medical history of DM Type I, CAD, s/p CABGX4 (2013 at [facility], LIMA to LAD, SVG to RI, sequential vein graft to OM and PDA), previous catheterization (2017) showing patent grafts, Stage III CKD, CVA (2004), Anemia, hypothyroidism, who presents to [facility]-ED for sudden onset slurred speech and chest pain during nuclear stress test, subsequently underwent left heart catheterization (10/23/20) which showed severe native vessel disease; patent LIMA-LAD, SVG-RI and sequential SVG-OM1 and dRCA. mild pHTN PCWP 11. Moderate AS 1.25 cm2 for which medical management was recommended. Echocardiogram on 10/23/20 showed LVEF 60-65% mil cLVH, moderate to severe AS. She continues to c/o exertional, sharp, left chest pain radiating to the back and relieved with rest while on 2 antianginal agents. She is also c/o fatigue and BLE edema. She has some mild left sided weakness from prior CVA. She denies palpitations, orthopnea, OND or syncope/near syncope.    C: 10/29/2020  VALVES: AORTIC VALVE: There was moderate to severe aortic stenosis., mean gradient 39 mmHg  CORONARY VESSELS: The coronary circulation is right dominant.  LM:     --  Distal left main: There was a 30 % stenosis.  LAD:     --  Ostial LAD: There was a 40 % stenosis.  --  Proximal LAD: Patent stent with moderate ISR (40%)  --  Mid LAD: There was a 100 % stenosis.  --  Distal LAD: The artery was supplied by a patent bypass graft. There was a diffuse 50 % stenosis.  CX:     --  Circumflex: Angiography showed severe atherosclerosis.  RI:     --  Ramus intermedius: There was a 80 % stenosis at the ostium of the vessel segment.  RCA:     --  Proximal RCA: There was a 70 % stenosis.  --  Distal RCA: There was a 70 % stenosis.  GRAFTS:     --  Graft to the LAD: The graft was a LIMA. Graft angiography showed no evidence of disease.  --  Graft to the ramus intermedius: The graft was a saphenous vein graft from the ascending aorta. Graft angiography showed mild atherosclerosis.  --  Graft to the 1st obtuse marginal: The graft was a large sized sequential vein graft from the ascending aorta. Graft angiography showed mild atherosclerosis.  --  Graft to the distal RCA: The graft was a large sized sequential vein graft from the ascending aorta. Graft angiography showed mild atherosclerosis.  Right Heart Pressures:       RA: 3       RV: 50/5       PA: 45/17/26       PCWP: 11       CO: 7.69       CI: 4.12       SVR: 15.74 Wood Units       PVR: 1.95 Wood Units    Symptoms:        Angina (Class): 3       Ischemic Symptoms: MENEZES (CCS class 3 anginal equivalent)    Heart Failure: ACC/AHA stage c HFpEF, NYHA functional class 3    Assessment of LVEF (Must be within 6 months):       EF:  60-65%        Assessed by:   Echocardiogram       Date:    10/2020     Prior Cardiac Interventions (LHC, stents, CABG):       PCI's (Date, Stents, Vessels): Unknown vessels       CABG (Date, Grafts): CABG X 4 (2013 at [facility]) LIMA, SVG to RI, sequential vein graft to OM and PDA     Noninvasive Testing:   Stress Test: Date: 10/23/2020        Protocol: Lexiscan       Duration of Exercise:         Symptoms: None       EKG Changes:  No diagnostic changes        DTS: N/A       Myocardial Imaging: There are small, mild defects in mid anterior, mid anteroseptal  walls that are predominantly reversible, suggestive of ischemia. There are medium sized, moderate defects in basal and mid inferior, basal and mid inferolateral walls that are minimally reversible suggestive of infarction with  mild cathy-infarct ischemia. Hypokinesis of basal inferior and inferolateral wall.    Echo: 10/16/2020       LVSF: Normal with mild concentric LVH.       EF: 61%       RVSF: Normal       LA: Normal       RA: Normal       Mitral Valve: Thickened leaflets, MAC, mild MR       Aortic Valve: Calcified with normal opening, mild AR.       Tricuspid Valve: Trace TR       Pulmonic Valve: Trace MO       Pericardium: No pericardial effusion    Antianginal Therapies:        Beta Blockers: N/A       Calcium Channel Blockers: Amlodipine 10 mg daily       Long Acting Nitrates: Imdur 30 mg daily       Ranexa: N/A    Associated Risk Factors:        Cerebrovascular Disease: TIA, CVA       Chronic Lung Disease: N/A       Peripheral Arterial Disease: N/A       Chronic Kidney Disease (if yes, what is GFR): N/A       Uncontrolled Diabetes (if yes, what is HgbA1C or FBS): N/A       Poorly Controlled Hypertension (if yes, what is SBP): N/A       Morbid Obesity (if yes, what is BMI): N/A       History of Recent Ventricular Arrhythmia: N/A       Inability to Ambulate Safely: N/A       Need for Therapeutic Anticoagulation: N/A       Antiplatelet or Contrast Allergy: N/A 69 y/o female former smoker with medical history of DM Type I, CAD, s/p CABGX4 (2013 at [facility], LIMA to LAD, SVG to RI, sequential vein graft to OM and PDA), previous catheterization (2017) showing patent grafts, Stage III CKD, CVA (2004), Anemia, hypothyroidism, who presents to [facility]-ED for sudden onset slurred speech and chest pain during nuclear stress test, subsequently underwent left heart catheterization (10/23/20) which showed severe native vessel disease; patent LIMA-LAD, SVG-RI and sequential SVG-OM1 and dRCA. mild pHTN PCWP 11. Moderate AS 1.25 cm2 for which medical management was recommended. Echocardiogram on 10/23/20 showed LVEF 60-65% mil cLVH, moderate to severe AS. She continues to c/o exertional, sharp, left chest pain radiating to the back and relieved with rest while on 2 antianginal agents. She is also c/o fatigue and BLE edema. She has some mild left sided weakness from prior CVA. She denies palpitations, orthopnea, OND or syncope/near syncope.    C: 10/29/2020  VALVES: AORTIC VALVE: There was moderate to severe aortic stenosis., mean gradient 39 mmHg  CORONARY VESSELS: The coronary circulation is right dominant.  LM:     --  Distal left main: There was a 30 % stenosis.  LAD:     --  Ostial LAD: There was a 40 % stenosis.  --  Proximal LAD: Patent stent with moderate ISR (40%)  --  Mid LAD: There was a 100 % stenosis.  --  Distal LAD: The artery was supplied by a patent bypass graft. There was a diffuse 50 % stenosis.  CX:     --  Circumflex: Angiography showed severe atherosclerosis.  RI:     --  Ramus intermedius: There was a 80 % stenosis at the ostium of the vessel segment.  RCA:     --  Proximal RCA: There was a 70 % stenosis.  --  Distal RCA: There was a 70 % stenosis.  GRAFTS:     --  Graft to the LAD: The graft was a LIMA. Graft angiography showed no evidence of disease.  --  Graft to the ramus intermedius: The graft was a saphenous vein graft from the ascending aorta. Graft angiography showed mild atherosclerosis.  --  Graft to the 1st obtuse marginal: The graft was a large sized sequential vein graft from the ascending aorta. Graft angiography showed mild atherosclerosis.  --  Graft to the distal RCA: The graft was a large sized sequential vein graft from the ascending aorta. Graft angiography showed mild atherosclerosis.  Right Heart Pressures:       RA: 3       RV: 50/5       PA: 45/17/26       PCWP: 11       CO: 7.69       CI: 4.12       SVR: 15.74 Wood Units       PVR: 1.95 Wood Units    Symptoms:        Angina (Class): 3       Ischemic Symptoms: MENEZES (CCS class 3 anginal equivalent)    Heart Failure: ACC/AHA stage c HFpEF, NYHA functional class 3    Assessment of LVEF (Must be within 6 months):       EF:  60-65%        Assessed by:   Echocardiogram       Date:    10/2020     Prior Cardiac Interventions (LHC, stents, CABG):       PCI's (Date, Stents, Vessels): Unknown vessels       CABG (Date, Grafts): CABG X 4 (2013 at [facility]) LIMA, SVG to RI, sequential vein graft to OM and PDA     Noninvasive Testing:   Stress Test: Date: 10/23/2020        Protocol: Lexiscan       Duration of Exercise:         Symptoms: None       EKG Changes:  No diagnostic changes        DTS: N/A       Myocardial Imaging: There are small, mild defects in mid anterior, mid anteroseptal  walls that are predominantly reversible, suggestive of ischemia. There are medium sized, moderate defects in basal and mid inferior, basal and mid inferolateral walls that are minimally reversible suggestive of infarction with  mild cathy-infarct ischemia. Hypokinesis of basal inferior and inferolateral wall.    Echo: 10/23/2020  Left Ventricle: The left ventricular internal cavity size is normal. Global LV systolic function was normal. Left ventricular ejection fraction, by visual estimation, is 60 to 65%. Spectral Doppler shows pseudonormal pattern of left ventricular myocardial filling (Grade II diastolic dysfunction). Elevated mean left atrial pressure.  Right Ventricle: Normal right ventricular size and function.  Left Atrium: Moderately enlarged left atrium.  Right Atrium: Normal right atrial size.  Pericardium: There is no evidence of pericardial effusion.  Mitral Valve: Structurally normal mitral valve, with normal leaflet excursion. Mild thickening and calcification of the anterior and posterior mitral valve leaflets. There is moderate mitral annular calcification. Trace mitral valve regurgitation is seen.  Tricuspid Valve: Structurally normal tricuspid valve, with normal leaflet excursion. Trivial tricuspid regurgitation is visualized. Adequate TR velocity was not obtained to accurately assess RVSP.  Aortic Valve: The aortic valve is trileaflet. Moderate to severe aortic stenosis is present. Trivial aortic valve regurgitation is seen. The Dimesionless Index value is 0.29.  Pulmonic Valve: Mild pulmonic valve regurgitation.  Aorta: There is dilatation of the aortic root.  Venous: The inferior vena cava is normal. The inferior vena cava was normal sized, with respiratory size variation greater than 50%.    Antianginal Therapies:        Beta Blockers: N/A       Calcium Channel Blockers: Amlodipine 10 mg daily       Long Acting Nitrates: Imdur 30 mg daily       Ranexa: N/A    Associated Risk Factors:        Cerebrovascular Disease: TIA, CVA       Chronic Lung Disease: N/A       Peripheral Arterial Disease: N/A       Chronic Kidney Disease (if yes, what is GFR): N/A       Uncontrolled Diabetes (if yes, what is HgbA1C or FBS): N/A       Poorly Controlled Hypertension (if yes, what is SBP): N/A       Morbid Obesity (if yes, what is BMI): N/A       History of Recent Ventricular Arrhythmia: N/A       Inability to Ambulate Safely: N/A       Need for Therapeutic Anticoagulation: N/A       Antiplatelet or Contrast Allergy: N/A

## 2020-12-28 NOTE — H&P PST ADULT - NSICDXFAMILYHX_GEN_ALL_CORE_FT
FAMILY HISTORY:  No pertinent family history in first degree relatives     FAMILY HISTORY:  Grandparent  Still living? No  Family history of cerebrovascular accident (CVA), Age at diagnosis: Age Unknown    Uncle  Still living? No  Family history of diabetes mellitus, Age at diagnosis: Age Unknown

## 2020-12-28 NOTE — ASU PATIENT PROFILE, ADULT - PMH
Blind left eye    Breast cancer    Coronary artery disease    CVA (cerebral vascular accident)  R weakness  Diabetes mellitus    Glaucoma    H/O diabetic retinopathy  vision loss L eye  Hiatal hernia    HTN (hypertension)    Hypercholesterolemia    Hypothyroid

## 2020-12-28 NOTE — DISCHARGE NOTE PROVIDER - NSDCMRMEDTOKEN_GEN_ALL_CORE_FT
amLODIPine 10 mg oral tablet: 1 tab(s) orally once a day  aspirin 81 mg oral tablet: 1 tab(s) orally once a day  atorvastatin 40 mg oral tablet: 1 tab(s) orally once a day  gabapentin 100 mg oral capsule: 1 cap(s) orally 2 times a day  hydrALAZINE 50 mg oral tablet: 1 tab(s) orally every 8 hours  isosorbide mononitrate 30 mg oral tablet, extended release: 1 tab(s) orally once a day  Lantus 100 units/mL subcutaneous solution: 25 unit(s) subcutaneous once a day (only took 10 units pm  12-  for procedure)  levothyroxine 137 mcg (0.137 mg) oral tablet: 1 tab(s) orally once a day  losartan 100 mg oral tablet: 1 tab(s) orally once a day  NovoLOG 100 units/mL subcutaneous solution: 10 unit(s) subcutaneous 3 times a day

## 2020-12-28 NOTE — H&P PST ADULT - NEGATIVE NEUROLOGICAL SYMPTOMS
no transient paralysis/no generalized seizures/no focal seizures/no syncope/no tremors/no vertigo/no loss of sensation/no difficulty walking/no headache/no loss of consciousness/no hemiparesis

## 2020-12-28 NOTE — H&P PST ADULT - LAST STRESS TEST
10/2020 with elevated BP during stress 10/2020 with elevated BP during stress;  Hypokinesis of basal inferior and inferolateral wall.

## 2020-12-28 NOTE — H&P PST ADULT - ASSESSMENT
69 y/o female with medical history DM Type I, CAD s/p PCI CABGX4 (2013 at [facility]) LIMA, SVG to RI, sequential vein graft to OM and PDA, previous catheterization (2017) showing patent grafts, Stage III CKD, CVA (2004), Anemia, hypothyroidism, who presents to [facility]ED for sudden onset slurred speech and chest pain during nuclear stress test, subsequently underwent left heart catheterization (10/23/20) which showed severe native vessel disease; patent LIMA-LAD, SVG-RI and sequential SVG-OM1 and dRCA. milt pHTN PCWP 11. Moderate AS 1.25 cm2 for which medical management was recommended. Echocardiogram on 10/23/20 showed LVEF 60-65% mil cLVH, moderate to severe AS. Patient with dyspnea now presenting for left heart catheterization to evaluate progression of CAD.       -Patient seen and examined  -Labs and EKG reviewed   -Pre-procedure teaching completed with patient, questions answered   -Informed consent to be obtained by attending   -pt instructed IF STENT PLACED WILL REQUIRE TO STAY OVERNIGHT FOR MONITORING AND DISCHARGED IN THE AM    -cardiac rehab info provided if stent is placed recommended to start if needed post PCI      Assessment: 71 y/o female former smoker with medical history of DM Type I, CAD, s/p CABGX4 (2013 at [facility], LIMA to LAD, SVG to RI, sequential vein graft to OM and PDA), previous catheterization (2017) showing patent grafts, Stage III CKD, CVA (2004), Anemia, hypothyroidism, who presents to [facility]ED for sudden onset slurred speech and chest pain during nuclear stress test, subsequently underwent left heart catheterization (10/23/20) which showed severe native vessel disease; patent LIMA-LAD, SVG-RI and sequential SVG-OM1 and dRCA. mild pHTN PCWP 11. Moderate AS 1.25 cm2 for which medical management was recommended. Echocardiogram on 10/23/20 showed LVEF 60-65% mil cLVH, moderate to severe AS. She continues to c/o exertional, sharp, left chest pain radiating to the back and relieved with rest while on 2 antianginal agents. She is also c/o fatigue and BLE edema. She has some mild left sided weakness from prior CVA. She denies palpitations, orthopnea, OND or syncope/near syncope.    ASA: 3  Mall: 2  ABR: 1.6%  COVID: Negative (12/25/2020)    Problem List:   1. CAD of native arteries of native heart with unstable angina  2. Hypertension    Plan:   1. In light of severity of ischemic symptoms while on 2 antianginal therapies, LHC is warranted.. Consent obtained.  2. Will start DAPT if PCI performed.  3. IV: NS KVO  4. Aspirin if not taken today.  5. Patient did not take her antihypertensives this morning. Will give her her normal antihypensives now.

## 2020-12-28 NOTE — DISCHARGE NOTE PROVIDER - HOSPITAL COURSE
71 y/o female former smoker with medical history of DM Type I, CAD, s/p CABGX4 (2013 at [facility], LIMA to LAD, SVG to RI, sequential vein graft to OM and PDA), previous catheterization (2017) showing patent grafts, Stage III CKD, CVA (2004), Anemia, hypothyroidism, who presents to [facility] for sudden onset slurred speech and chest pain during nuclear stress test, subsequently underwent left heart catheterization (10/23/20) which showed severe native vessel disease; patent LIMA-LAD, SVG-RI and sequential SVG-OM1 and dRCA. mild pHTN PCWP 11. Moderate AS 1.25 cm2 for which medical management was recommended. Echocardiogram on 10/23/20 showed LVEF 60-65% mil cLVH, moderate to severe AS. She continues to c/o exertional, sharp, left chest pain radiating to the back and relieved with rest while on 2 antianginal agents. She is also c/o fatigue and BLE edema. She has some mild left sided weakness from prior CVA. She denies palpitations, orthopnea, OND or syncope/near syncope. She       Plan:   1. Medications:       DAPT: Plavix 75mg daily and aspirin 81mg daily.       Statin:        Beta Blocker:     2. Continue other medications    3. Follow up with Dr. Limon    4. Post PCI teaching done including medication compliance, follow up, complications, and emergency actions. 69 y/o female former smoker with medical history of DM Type I, CAD, s/p CABGX4 (2013 at [facility], LIMA to LAD, SVG to RI, sequential vein graft to OM and PDA), previous catheterization (2017) showing patent grafts, Stage III CKD, CVA (2004), Anemia, hypothyroidism, who presents to [facility]ED for sudden onset slurred speech and chest pain during nuclear stress test, subsequently underwent left heart catheterization (10/23/20) which showed severe native vessel disease; patent LIMA-LAD, SVG-RI and sequential SVG-OM1 and dRCA. mild pHTN PCWP 11. Moderate AS 1.25 cm2 for which medical management was recommended. Echocardiogram on 10/23/20 showed LVEF 60-65% mil cLVH, moderate to severe AS. She continues to c/o exertional, sharp, left chest pain radiating to the back and relieved with rest while on 2 antianginal agents. She is also c/o fatigue and BLE edema. She has some mild left sided weakness from prior CVA. She denies palpitations, orthopnea, OND or syncope/near syncope. It was discovered that she was to have a peripheral angiography and will return tomorrow for a peripheral angiography with Dr. Fernandez.

## 2020-12-29 ENCOUNTER — OUTPATIENT (OUTPATIENT)
Dept: OUTPATIENT SERVICES | Facility: HOSPITAL | Age: 70
LOS: 1 days | Discharge: HOME | End: 2020-12-29
Payer: MEDICARE

## 2020-12-29 ENCOUNTER — TRANSCRIPTION ENCOUNTER (OUTPATIENT)
Age: 70
End: 2020-12-29

## 2020-12-29 VITALS — DIASTOLIC BLOOD PRESSURE: 60 MMHG | RESPIRATION RATE: 18 BRPM | SYSTOLIC BLOOD PRESSURE: 134 MMHG | HEART RATE: 82 BPM

## 2020-12-29 VITALS
HEIGHT: 62 IN | RESPIRATION RATE: 18 BRPM | OXYGEN SATURATION: 98 % | SYSTOLIC BLOOD PRESSURE: 144 MMHG | WEIGHT: 190.26 LBS | HEART RATE: 84 BPM | DIASTOLIC BLOOD PRESSURE: 66 MMHG | TEMPERATURE: 98 F

## 2020-12-29 DIAGNOSIS — Z95.1 PRESENCE OF AORTOCORONARY BYPASS GRAFT: Chronic | ICD-10-CM

## 2020-12-29 DIAGNOSIS — Z98.890 OTHER SPECIFIED POSTPROCEDURAL STATES: Chronic | ICD-10-CM

## 2020-12-29 DIAGNOSIS — I73.9 PERIPHERAL VASCULAR DISEASE, UNSPECIFIED: ICD-10-CM

## 2020-12-29 DIAGNOSIS — Z98.89 OTHER SPECIFIED POSTPROCEDURAL STATES: Chronic | ICD-10-CM

## 2020-12-29 DIAGNOSIS — Z90.10 ACQUIRED ABSENCE OF UNSPECIFIED BREAST AND NIPPLE: Chronic | ICD-10-CM

## 2020-12-29 DIAGNOSIS — Z90.710 ACQUIRED ABSENCE OF BOTH CERVIX AND UTERUS: Chronic | ICD-10-CM

## 2020-12-29 DIAGNOSIS — Z98.49 CATARACT EXTRACTION STATUS, UNSPECIFIED EYE: Chronic | ICD-10-CM

## 2020-12-29 LAB
GLUCOSE BLDC GLUCOMTR-MCNC: 316 MG/DL — HIGH (ref 70–99)
SARS-COV-2 RNA SPEC QL NAA+PROBE: SIGNIFICANT CHANGE UP

## 2020-12-29 PROCEDURE — U0003: CPT

## 2020-12-29 PROCEDURE — 99153 MOD SED SAME PHYS/QHP EA: CPT

## 2020-12-29 PROCEDURE — 80048 BASIC METABOLIC PNL TOTAL CA: CPT

## 2020-12-29 PROCEDURE — 37224: CPT

## 2020-12-29 PROCEDURE — C1725: CPT

## 2020-12-29 PROCEDURE — C1894: CPT

## 2020-12-29 PROCEDURE — 85025 COMPLETE CBC W/AUTO DIFF WBC: CPT

## 2020-12-29 PROCEDURE — C1887: CPT

## 2020-12-29 PROCEDURE — 99152 MOD SED SAME PHYS/QHP 5/>YRS: CPT

## 2020-12-29 PROCEDURE — 86850 RBC ANTIBODY SCREEN: CPT

## 2020-12-29 PROCEDURE — 85730 THROMBOPLASTIN TIME PARTIAL: CPT

## 2020-12-29 PROCEDURE — 86900 BLOOD TYPING SEROLOGIC ABO: CPT

## 2020-12-29 PROCEDURE — 86901 BLOOD TYPING SEROLOGIC RH(D): CPT

## 2020-12-29 PROCEDURE — 85610 PROTHROMBIN TIME: CPT

## 2020-12-29 PROCEDURE — 82962 GLUCOSE BLOOD TEST: CPT

## 2020-12-29 PROCEDURE — 83735 ASSAY OF MAGNESIUM: CPT

## 2020-12-29 PROCEDURE — 36415 COLL VENOUS BLD VENIPUNCTURE: CPT

## 2020-12-29 PROCEDURE — 93005 ELECTROCARDIOGRAM TRACING: CPT

## 2020-12-29 PROCEDURE — C2623: CPT

## 2020-12-29 PROCEDURE — C1769: CPT

## 2020-12-29 RX ORDER — FENTANYL CITRATE 50 UG/ML
25 INJECTION INTRAVENOUS ONCE
Refills: 0 | Status: DISCONTINUED | OUTPATIENT
Start: 2020-12-29 | End: 2020-12-29

## 2020-12-29 RX ORDER — CLOPIDOGREL BISULFATE 75 MG/1
1 TABLET, FILM COATED ORAL
Qty: 90 | Refills: 3
Start: 2020-12-29 | End: 2021-12-23

## 2020-12-29 RX ADMIN — FENTANYL CITRATE 25 MICROGRAM(S): 50 INJECTION INTRAVENOUS at 14:23

## 2020-12-29 RX ADMIN — FENTANYL CITRATE 25 MICROGRAM(S): 50 INJECTION INTRAVENOUS at 13:53

## 2020-12-29 NOTE — CHART NOTE - NSCHARTNOTEFT_GEN_A_CORE
Department of Cardiology                                                                  [facility]/[place] [address]                                                            Telephone: [phone]. Fax:[phone]                                                                                   Pre Peripheral Note    70y Female    Planned Procedure: Peripheral angiogram    Narrative:     Risk Factors for PAD       Age: 70       DM: N/A       Smoking History: N/A       Hyperlipidemia: N/A       Hypertension: N/A       Family History of PAD: N/A       Known Atherosclerotic Disease (coronary, carotid, subclavian, renal, mesenteric, AAA):     Subjective Complaints:        Claudication Thao Class:        Impaired Walking Function: N/A       Ischemic Rest Pain: N/A       Other Non-Joint Related Exertional Lower Extremity Symptoms (not typical of claudication): N/A    Objective Findings:        Lower Extremity Pulses: Unable to palpate DP pulses, doppler pulses noted.       Nonhealing Lower Extremity Wound: N/A       Lower Extremity Gangrene: N/A       Vascular Bruit: N/A       Other Suggestive Lower Extremity Findings (elevation pallor, dependent rubor): N/A    Vascular Testing:        LAINA (Normal/Borderline/Abnormal/Noncompressable):        Arterial Dopplers:        CTA/MRA:     Medical Management:       Antiplatelets:        Cilostazol:        Statin:     Thao Claudication Classification:        I: Asymptomatic       IIa: Walking > 200 meters (2.5 blocks)       IIb: Walking < 200 meters (2.5 blocks)       III: Rest/nocturnal pain       IV: Necrosis/gangrene    Ankle-Brachial Index:       Noncompressable: > 1.4       Normal: 1.0 to 1.4       Borderline: 0.91 to 0.99       Abnormal: < 0.91    PAST MEDICAL & SURGICAL HISTORY:  Microalbuminuria  Anemia, unspecified type  History of myocardial infarction  Coronary artery disease involving native coronary artery of native heart with unstable angina pector  Stage 3 chronic kidney disease, unspecified whether stage 3a or 3b CKD  Heart failure with preserved ejection fraction  Gastroesophageal reflux disease with esophagitis, unspecified whether hemorrhage  Essential hypertension  Type 1 diabetes mellitus with diabetic neuropathy  Transient ischemic attack  Cerebrovascular accident (CVA) due to embolism of left middle cerebral artery  Blind left eye  H/O diabetic retinopathy: vision loss L eye  Hiatal hernia  Breast cancer: Chemo and radiation  Glaucoma  Hypothyroid  Hypercholesterolemia  S/P laparoscopic hysterectomy  Status post cataract extraction and insertion of intraocular lens, unspecified laterality  History of repair of hiatal hernia  S/P tonsillectomy  S/P appendectomy  S/P cardiac cath: with stents  S/P CABG x 4: LIMA to the LAD, SVG to the RI, sequential SVG to the OM1 and dRCA, 2013 @ [facility]  S/P breast reconstruction, bilateral  S/P mastectomy    Physical Examination:   General: Awake, alert, speech clear, no acute distress.  Neck: No bruit, normal jugular venous pressures  Chest: Clear CTA, S1, S2, no murmur, RRR  Extremities: No edema                          11.8   6.90  )-----------( 151      ( 28 Dec 2020 09:23 )             37.0     12-28    136  |  103  |  22.0<H>  ----------------------------<  226<H>  4.5   |  23.0  |  0.82    Ca    9.5      28 Dec 2020 09:23  Mg     1.9     12-28      PT/INR - ( 28 Dec 2020 09:23 )   PT: 11.3 sec;   INR: 0.97 ratio         PTT - ( 28 Dec 2020 09:23 )  PTT:34.0 sec    Assessment and Plan:   The patient was examined and interviewed by me today. There has been no change from the original history and physical except as noted above.    Problem List:   1.       2. Department of Cardiology                                                                  [facility]/[place] [address] [place] [address]                                                            Telephone: [phone]. Fax:[phone]                                                                                   Pre Peripheral Note    70y Female    Planned Procedure: Peripheral angiogram and possible intervention    Narrative: 71 y/o female former smoker with medical history of DM Type I, CAD, s/p CABGX4 (2013 at [facility], LIMA to LAD, SVG to RI, sequential vein graft to OM and PDA), previous catheterization (2017) showing patent grafts, Stage III CKD, CVA (2004), Anemia, hypothyroidism, who presents to [facility] for sudden onset slurred speech and chest pain during nuclear stress test, subsequently underwent left heart catheterization (10/23/20) which showed severe native vessel disease; patent LIMA-LAD, SVG-RI and sequential SVG-OM1 and dRCA. mild pHTN PCWP 11. Moderate AS 1.25 cm2 for which medical management was recommended. Echocardiogram on 10/23/20 showed LVEF 60-65% mild concentric LVH, moderate to severe AS. She continues to c/o exertional, sharp, left chest pain radiating to the back and relieved with rest while on 2 antianginal agents. She has been c/o BLE fatigue, heaviness, and burning sensation when walking approximately 20 feet. She is also c/o fatigue and BLE edema. She has some mild left sided weakness from prior CVA. She denies palpitations, orthopnea, OND or syncope/near syncope. B/L arterial dopplers showed significant B/L PVD much greater on right than left, with segmental high grade stenosis proximally, mid thigh, and distally    Risk Factors for PAD       Age: 70       DM: Type I       Smoking History: Former smoker       Hyperlipidemia: Yes       Hypertension: Yes       Family History of PAD: N/A       Known Atherosclerotic Disease (coronary, carotid, subclavian, renal, mesenteric, AAA): CAD, S/P PCI and CABG, Extensive aortoiliac plaque.    Subjective Complaints:        Claudication Thao Class: IIb       Impaired Walking Function: N/A       Ischemic Rest Pain: N/A       Other Non-Joint Related Exertional Lower Extremity Symptoms (not typical of claudication): N/A    Objective Findings:        Lower Extremity Pulses: 2+ B/L.       Nonhealing Lower Extremity Wound: N/A       Lower Extremity Gangrene: N/A       Vascular Bruit: N/A       Other Suggestive Lower Extremity Findings (elevation pallor, dependent rubor): N/A    Vascular Testing:        LAINA (Normal/Borderline/Abnormal/Noncompressable): N/A         Arterial Dopplers: Significant B/L PVD much greater on right than left, with segmental high grade stenosis proximally, mid thigh, and distally.         CTA:   Left Lower Extremity:        Iliac: Diffuse calcified plaque in common iliac artery without significant stenosis, scattered calcified plaque in external iliac artery without significant stenosis, and marked calcified plaque in the internal iliac arteries with moderate stenosis.       CFA:        SFA: Severe diffuse atherosclerotic disease with diffuse calcified plaque and multiple moderate to severe stenosis.       Popliteal: Severe diffuse atherosclerotic disease with diffuse calcified plaque and multiple moderate to severe stenosis.       PT: Diffuse severe stenosis       AT: Severely stenotic in the proximal calf and occludes distally.       Run Off: Peroneal artery  Right Lower Extremity:        Iliac: Diffuse calcified plaque in common iliac artery without significant stenosis, scattered calcified plaque in external iliac artery without significant stenosis, and marked calcified plaque in the internal iliac arteries with moderate stenosis.       CFA: Common and deep femoral arteries patent with calcified plaque and no significant stenosis.       SFA: Severe diffuse atherosclerotic disease with diffuse calcified plaque and multiple moderate to severe stenosis.       Popliteal: Severe diffuse atherosclerotic disease with diffuse calcified plaque and multiple moderate to severe stenosis.       PT: Occluded in the proximal to mid calf and reconstitutes above the ankle joint.       AT: Severely stenotic in the mid to distal calf.       Run Off: Peroneal artery    Medical Management:       Antiplatelets: Aspirin 81 mg daily       Cilostazol: N/A       Statin: Lipiotr 40 mg daily    PAST MEDICAL & SURGICAL HISTORY:  Microalbuminuria  Anemia, unspecified type  History of myocardial infarction  Coronary artery disease involving native coronary artery of native heart with unstable angina pector  Stage 3 chronic kidney disease, unspecified whether stage 3a or 3b CKD  Heart failure with preserved ejection fraction  Gastroesophageal reflux disease with esophagitis, unspecified whether hemorrhage  Essential hypertension  Type 1 diabetes mellitus with diabetic neuropathy  Transient ischemic attack  Cerebrovascular accident (CVA) due to embolism of left middle cerebral artery  Blind left eye  H/O diabetic retinopathy: vision loss L eye  Hiatal hernia  Breast cancer: Chemo and radiation  Glaucoma  Hypothyroid  Hypercholesterolemia  S/P laparoscopic hysterectomy  Status post cataract extraction and insertion of intraocular lens, unspecified laterality  History of repair of hiatal hernia  S/P tonsillectomy  S/P appendectomy  S/P cardiac cath: with stents  S/P CABG x 4: LIMA to the LAD, SVG to the RI, sequential SVG to the OM1 and dRCA, 2013 @ [facility]  S/P breast reconstruction, bilateral  S/P mastectomy    Physical Examination:   General: Awake, alert, speech clear, no acute distress.  Neck: No bruit, normal jugular venous pressures  Chest: Clear CTA, S1, S2, no murmur, RRR  Extremities: No edema                          11.8   6.90  )-----------( 151      ( 28 Dec 2020 09:23 )             37.0     12-28    136  |  103  |  22.0  ----------------------------<  226  4.5   |  23.0  |  0.82    Ca    9.5      28 Dec 2020 09:23  Mg     1.9     12-28      PT/INR - ( 28 Dec 2020 09:23 )   PT: 11.3 sec;   INR: 0.97 ratio    PTT - ( 28 Dec 2020 09:23 )  PTT:34.0 sec    Echo: 10/23/2020  Left Ventricle: The left ventricular internal cavity size is normal. Global LV systolic function was normal. Left ventricular ejection fraction, by visual estimation, is 60 to 65%. Spectral Doppler shows pseudonormal pattern of left ventricular myocardial filling (Grade II diastolic dysfunction). Elevated mean left atrial pressure.  Right Ventricle: Normal right ventricular size and function.  Left Atrium: Moderately enlarged left atrium.  Right Atrium: Normal right atrial size.  Pericardium: There is no evidence of pericardial effusion.  Mitral Valve: Structurally normal mitral valve, with normal leaflet excursion. Mild thickening and calcification of the anterior and posterior mitral valve leaflets. There is moderate mitral annular calcification. Trace mitral valve regurgitation is seen.  Tricuspid Valve: Structurally normal tricuspid valve, with normal leaflet excursion. Trivial tricuspid regurgitation is visualized. Adequate TR velocity was not obtained to accurately assess RVSP.  Aortic Valve: The aortic valve is trileaflet. Moderate to severe aortic stenosis is present. Trivial aortic valve regurgitation is seen. The Dimesionless Index value is 0.29.  Pulmonic Valve: Mild pulmonic valve regurgitation.  Aorta: There is dilatation of the aortic root.  Venous: The inferior vena cava is normal. The inferior vena cava was normal sized, with respiratory size variation greater than 50%.    Assessment and Plan:   The patient was examined and interviewed by me today. There has been no change from the original history and physical except as noted above.    Problem List:   1. PAD with claudication  BLE peripheral angiography    2. HFpEF    3. CKD  IV hydration    4. DM  FS Q AC and HS with coverage  restart Lantus and Novolog post procedure,  Diabetic diet

## 2020-12-29 NOTE — PROGRESS NOTE ADULT - SUBJECTIVE AND OBJECTIVE BOX
Department of Cardiology                                                                  [facility]/[place] [address] [place] [address]                                                            Telephone: [phone]. Fax:[phone]                                                                                         PERIPHERAL NOTE     70yFemale S/P lower extremity angiography as stated below.  The patient denies chest pain, SOB, leg/foot pain or groin pain.    Left Lower Extremity       Iliac: Patent       CFA: Patent       SFA: Moderate disease       Profunda: Patent       Popliteal: Patent       AT: Occluded       PT: Occluded       Peroneal: Patent    Right Lower Extremity       Iliac: Patent       CFA: Patent       SFA: 90% proximal stenosis treated with angioplasty and Lutonix DCB (6.0mm X 100mm X 130CM) with 20% residual stenosis, 70% mid stenosis treated with angioplasty and Lutonix DCB (6.0mm X 80mm X 130CM) with 20% residual stenosis, and 70% distal stenosis treated with angioplasty and Lutonix DCB (5.0mm X 60mm X 130CM) with 0% residual stenosis.       Profunda: Patent       Popliteal: Patent       AT: Occluded       PT: Occluded       Peroneal: Patent    HPI: 71 y/o female former smoker with medical history of DM Type I, CAD, s/p CABGX4 (2013 at [facility], LIMA to LAD, SVG to RI, sequential vein graft to OM and PDA), previous catheterization (2017) showing patent grafts, Stage III CKD, CVA (2004), Anemia, hypothyroidism, who presents to [facility] for sudden onset slurred speech and chest pain during nuclear stress test, subsequently underwent left heart catheterization (10/23/20) which showed severe native vessel disease; patent LIMA-LAD, SVG-RI and sequential SVG-OM1 and dRCA. mild pHTN PCWP 11. Moderate AS 1.25 cm2 for which medical management was recommended. Echocardiogram on 10/23/20 showed LVEF 60-65% mild concentric LVH, moderate to severe AS. She continues to c/o exertional, sharp, left chest pain radiating to the back and relieved with rest while on 2 antianginal agents. She has been c/o BLE fatigue, heaviness, and burning sensation when walking approximately 20 feet. She is also c/o fatigue and BLE edema. She has some mild left sided weakness from prior CVA. She denies palpitations, orthopnea, OND or syncope/near syncope. B/L arterial dopplers showed significant B/L PVD much greater on right than left, with segmental high grade stenosis proximally, mid thigh, and distally. CTA showed severe BLE PAD.    General: Awake, alert, oriented, in no acute distress   Chest: CTA, S1, S2, RRR  Left Groin: 6fr. left femoral artery sheath, soft, no bleeding, no hematoma  Extremities: No edema  Pulses:        Right: 2+       Left: 2+    Labs:                                    11.8   6.90  )-----------( 151      ( 28 Dec 2020 09:23 )             37.0     12-28    136  |  103  |  22.0  ----------------------------<  226  4.5   |  23.0  |  0.82    Ca    9.5      28 Dec 2020 09:23  Mg     1.9     12-28      PT/INR - ( 28 Dec 2020 09:23 )   PT: 11.3 sec;   INR: 0.97 ratio    PTT - ( 28 Dec 2020 09:23 )  PTT:34.0 sec    Echo: 10/23/2020  Left Ventricle: The left ventricular internal cavity size is normal. Global LV systolic function was normal. Left ventricular ejection fraction, by visual estimation, is 60 to 65%. Spectral Doppler shows pseudonormal pattern of left ventricular myocardial filling (Grade II diastolic dysfunction). Elevated mean left atrial pressure.  Right Ventricle: Normal right ventricular size and function.  Left Atrium: Moderately enlarged left atrium.  Right Atrium: Normal right atrial size.  Pericardium: There is no evidence of pericardial effusion.  Mitral Valve: Structurally normal mitral valve, with normal leaflet excursion. Mild thickening and calcification of the anterior and posterior mitral valve leaflets. There is moderate mitral annular calcification. Trace mitral valve regurgitation is seen.  Tricuspid Valve: Structurally normal tricuspid valve, with normal leaflet excursion. Trivial tricuspid regurgitation is visualized. Adequate TR velocity was not obtained to accurately assess RVSP.  Aortic Valve: The aortic valve is trileaflet. Moderate to severe aortic stenosis is present. Trivial aortic valve regurgitation is seen. The Dimesionless Index value is 0.29.  Pulmonic Valve: Mild pulmonic valve regurgitation.  Aorta: There is dilatation of the aortic root.  Venous: The inferior vena cava is normal. The inferior vena cava was normal sized, with respiratory size variation greater than 50%.

## 2020-12-29 NOTE — DISCHARGE NOTE PROVIDER - NSDCCPTREATMENT_GEN_ALL_CORE_FT
PRINCIPAL PROCEDURE  Procedure: Angioplasty, artery, femoral  Findings and Treatment: 90% proximal SFA stenosis treated with angioplasty and Lutonix DCB (6.0mm X 100mm X 130CM) with 20% residual stenosis, 70% mid SFA stenosis treated with angioplasty and Lutonix DCB (6.0mm X 80mm X 130CM) with 20% residual stenosis, and 70% distal SFA stenosis treated with angioplasty and Lutonix DCB (5.0mm X 60mm X 130CM) with 0% residual stenosis.

## 2020-12-29 NOTE — DISCHARGE NOTE PROVIDER - NSDCMRMEDTOKEN_GEN_ALL_CORE_FT
amLODIPine 10 mg oral tablet: 1 tab(s) orally once a day  aspirin 81 mg oral tablet: 1 tab(s) orally once a day  atorvastatin 40 mg oral tablet: 1 tab(s) orally once a day  gabapentin 100 mg oral capsule: 1 cap(s) orally 2 times a day  hydrALAZINE 50 mg oral tablet: 1 tab(s) orally 4 times a day  isosorbide mononitrate 30 mg oral tablet, extended release: 1 tab(s) orally once a day  Lantus 100 units/mL subcutaneous solution: 25 unit(s) subcutaneous once a day (only took 10 units pm  12-  for procedure)  levothyroxine 137 mcg (0.137 mg) oral tablet: 1 tab(s) orally once a day  losartan 100 mg oral tablet: 1 tab(s) orally once a day  NovoLOG 100 units/mL subcutaneous solution: 10 unit(s) subcutaneous 3 times a day   amLODIPine 10 mg oral tablet: 1 tab(s) orally once a day  aspirin 81 mg oral tablet: 1 tab(s) orally once a day  atorvastatin 40 mg oral tablet: 1 tab(s) orally once a day  clopidogrel 75 mg oral tablet: 1 tab(s) orally once a day   gabapentin 100 mg oral capsule: 1 cap(s) orally 2 times a day  hydrALAZINE 50 mg oral tablet: 1 tab(s) orally 4 times a day  isosorbide mononitrate 30 mg oral tablet, extended release: 1 tab(s) orally once a day  Lantus 100 units/mL subcutaneous solution: 25 unit(s) subcutaneous once a day (only took 10 units pm  12-  for procedure)  levothyroxine 137 mcg (0.137 mg) oral tablet: 1 tab(s) orally once a day  losartan 100 mg oral tablet: 1 tab(s) orally once a day  NovoLOG 100 units/mL subcutaneous solution: 10 unit(s) subcutaneous 3 times a day

## 2020-12-29 NOTE — DISCHARGE NOTE PROVIDER - HOSPITAL COURSE
69 y/o female former smoker with medical history of DM Type I, CAD, s/p CABGX4 (2013 at [facility], LIMA to LAD, SVG to RI, sequential vein graft to OM and PDA), previous catheterization (2017) showing patent grafts, Stage III CKD, CVA (2004), Anemia, hypothyroidism, who presents to [facility] for sudden onset slurred speech and chest pain during nuclear stress test, subsequently underwent left heart catheterization (10/23/20) which showed severe native vessel disease; patent LIMA-LAD, SVG-RI and sequential SVG-OM1 and dRCA. mild pHTN PCWP 11. Moderate AS 1.25 cm2 for which medical management was recommended. Echocardiogram on 10/23/20 showed LVEF 60-65% mild concentric LVH, moderate to severe AS. She continues to c/o exertional, sharp, left chest pain radiating to the back and relieved with rest while on 2 antianginal agents. She has been c/o BLE fatigue, heaviness, and burning sensation when walking approximately 20 feet. She is also c/o fatigue and BLE edema. She has some mild left sided weakness from prior CVA. She denies palpitations, orthopnea, OND or syncope/near syncope. B/L arterial dopplers showed significant B/L PVD much greater on right than left, with segmental high grade stenosis proximally, mid thigh, and distally. CTA showed severe BLE PAD. She     Plan:   1. Medications:       DAPT: Plavix 75mg daily and aspirin 81mg daily.       Statin:        Beta Blocker:     2. Continue other medications    3. Follow up with Dr. Limon    4. Post procedure teaching done including medication compliance, follow up, complications, and emergency actions. 71 y/o female former smoker with medical history of DM Type I, CAD, s/p CABGX4 (2013 at [facility], LIMA to LAD, SVG to RI, sequential vein graft to OM and PDA), previous catheterization (2017) showing patent grafts, Stage III CKD, CVA (2004), Anemia, hypothyroidism, who presents to [facility] for sudden onset slurred speech and chest pain during nuclear stress test, subsequently underwent left heart catheterization (10/23/20) which showed severe native vessel disease; patent LIMA-LAD, SVG-RI and sequential SVG-OM1 and dRCA. mild pHTN PCWP 11. Moderate AS 1.25 cm2 for which medical management was recommended. Echocardiogram on 10/23/20 showed LVEF 60-65% mild concentric LVH, moderate to severe AS. She continues to c/o exertional, sharp, left chest pain radiating to the back and relieved with rest while on 2 antianginal agents. She has been c/o BLE fatigue, heaviness, and burning sensation when walking approximately 20 feet. She is also c/o fatigue and BLE edema. She has some mild left sided weakness from prior CVA. She denies palpitations, orthopnea, OND or syncope/near syncope. B/L arterial dopplers showed significant B/L PVD much greater on right than left, with segmental high grade stenosis proximally, mid thigh, and distally. CTA showed severe BLE PAD. She had a BLE angiography and intervention as below:   Left Lower Extremity       Iliac: Patent       CFA: Patent       SFA: Moderate disease       Profunda: Patent       Popliteal: Patent       AT: Occluded       PT: Occluded       Peroneal: Patent    Right Lower Extremity       Iliac: Patent       CFA: Patent       SFA: 90% proximal stenosis treated with angioplasty and Lutonix DCB (6.0mm X 100mm X 130CM) with 20% residual stenosis, 70% mid stenosis treated with angioplasty and Lutonix DCB (6.0mm X 80mm X 130CM) with 20% residual stenosis, and 70% distal stenosis treated with angioplasty and Lutonix DCB (5.0mm X 60mm X 130CM) with 0% residual stenosis.       Profunda: Patent       Popliteal: Patent       AT: Occluded       PT: Occluded       Peroneal: Patent    Plan:   1. Medications:       DAPT: Plavix 75mg daily and aspirin 81mg daily.       Statin: Lipitor 40 mg daily    2. Continue other medications    3. Follow up with Dr. Limon    4. Post procedure teaching done including medication compliance, follow up, complications, and emergency actions.

## 2020-12-29 NOTE — DISCHARGE NOTE PROVIDER - NSDCCPCAREPLAN_GEN_ALL_CORE_FT
PRINCIPAL DISCHARGE DIAGNOSIS  Diagnosis: PAD (peripheral artery disease)  Assessment and Plan of Treatment: Plavix 75 mg daily and aspirin 81 mg daily

## 2020-12-29 NOTE — DISCHARGE NOTE NURSING/CASE MANAGEMENT/SOCIAL WORK - PATIENT PORTAL LINK FT
You can access the FollowMyHealth Patient Portal offered by [facility] by registering at the following website: [email or web address]. By joining [employer]’s FollowMyHealth portal, you will also be able to view your health information using other applications (apps) compatible with our system.

## 2020-12-29 NOTE — DISCHARGE NOTE PROVIDER - CARE PROVIDER_API CALL
Devin Limon  [facility]  [address] [place] [address]  Phone: [phone]  Fax: (   )    -  Established Patient  Follow Up Time: 2 weeks

## 2020-12-29 NOTE — DISCHARGE NOTE PROVIDER - PROVIDER TOKENS
FREE:[LAST:[Braxton],FIRST:[Devin],PHONE:[phone]],FAX:[phone]   [phone]    -],ADDRESS:[[place] [address]],FOLLOWUP:[2 weeks],ESTABLISHEDPATIENT:[T]]

## 2020-12-29 NOTE — PROGRESS NOTE ADULT - ASSESSMENT
Procedure: Angioplasty and DCB of right SFA  Pre-procedure Diagnosis: PAD  Post-procedure Diagnosis: PAD    1. Observe in holding room for 4 hours post sheath pull.     2. Sheath: Remove left femoral arterial sheath when ACT < 180    4. Bedrest: 2 hours post sheath pull    4. DAPT: Plavix 75 mg daily and aspirin 81 mg daily    5. Statin: Lipitor 40 mg daily    6. Continue current medications.    5. Discharge Plan: Later today

## 2020-12-29 NOTE — DISCHARGE NOTE PROVIDER - NSDCFUADDINST_GEN_ALL_CORE_FT
No heavy lifting, driving, sex, tub baths, swimming, or any activity that submerges the lower half of the body in water for 48 hours.  Limited walking and stairs for 48 hours.    Change the bandaid after 24 hours and every 24 hours after that.  Keep the puncture site dry and covered with a bandaid until a scab forms.    Observe the site frequently.  If bleeding or a large lump (the size of a golf ball or bigger) occurs lie flat, apply continuous direct pressure just above the puncture site for at least 10 minutes, and notify your physician immediately.  If the bleeding cannot be controlled, call 911 immediately for assistance.  Notify your physician of pain, swelling or any drainage.    Notify your physician immediately if coldness, numbness, discoloration or pain in your foot occurs.

## 2020-12-29 NOTE — DISCHARGE NOTE PROVIDER - NSDCACTIVITY_GEN_ALL_CORE
Showering allowed/Stairs allowed/Walking - Indoors allowed/No heavy lifting/straining Showering allowed/Stairs allowed/Walking - Indoors allowed/No heavy lifting/straining/Walking - Outdoors allowed

## 2021-01-05 ENCOUNTER — EMERGENCY (EMERGENCY)
Facility: HOSPITAL | Age: 71
LOS: 1 days | Discharge: HOME | End: 2021-01-05
Attending: EMERGENCY MEDICINE
Payer: MEDICARE

## 2021-01-05 VITALS
OXYGEN SATURATION: 99 % | RESPIRATION RATE: 18 BRPM | TEMPERATURE: 99 F | WEIGHT: 184.09 LBS | DIASTOLIC BLOOD PRESSURE: 78 MMHG | SYSTOLIC BLOOD PRESSURE: 153 MMHG | HEIGHT: 62 IN | HEART RATE: 78 BPM

## 2021-01-05 DIAGNOSIS — Z98.89 OTHER SPECIFIED POSTPROCEDURAL STATES: Chronic | ICD-10-CM

## 2021-01-05 DIAGNOSIS — Z95.1 PRESENCE OF AORTOCORONARY BYPASS GRAFT: Chronic | ICD-10-CM

## 2021-01-05 DIAGNOSIS — Z98.890 OTHER SPECIFIED POSTPROCEDURAL STATES: Chronic | ICD-10-CM

## 2021-01-05 DIAGNOSIS — Z98.49 CATARACT EXTRACTION STATUS, UNSPECIFIED EYE: Chronic | ICD-10-CM

## 2021-01-05 DIAGNOSIS — Z90.710 ACQUIRED ABSENCE OF BOTH CERVIX AND UTERUS: Chronic | ICD-10-CM

## 2021-01-05 DIAGNOSIS — Z90.10 ACQUIRED ABSENCE OF UNSPECIFIED BREAST AND NIPPLE: Chronic | ICD-10-CM

## 2021-01-05 PROBLEM — G45.9 TRANSIENT CEREBRAL ISCHEMIC ATTACK, UNSPECIFIED: Chronic | Status: ACTIVE | Noted: 2020-12-28

## 2021-01-05 PROBLEM — K21.00 GASTRO-ESOPHAGEAL REFLUX DISEASE WITH ESOPHAGITIS, WITHOUT BLEEDING: Chronic | Status: ACTIVE | Noted: 2020-12-28

## 2021-01-05 PROBLEM — I25.2 OLD MYOCARDIAL INFARCTION: Chronic | Status: ACTIVE | Noted: 2020-12-28

## 2021-01-05 PROBLEM — D64.9 ANEMIA, UNSPECIFIED: Chronic | Status: ACTIVE | Noted: 2020-12-28

## 2021-01-05 PROBLEM — I50.30 UNSPECIFIED DIASTOLIC (CONGESTIVE) HEART FAILURE: Chronic | Status: ACTIVE | Noted: 2020-12-28

## 2021-01-05 PROBLEM — R80.9 PROTEINURIA, UNSPECIFIED: Chronic | Status: ACTIVE | Noted: 2020-12-28

## 2021-01-05 PROBLEM — E10.40 TYPE 1 DIABETES MELLITUS WITH DIABETIC NEUROPATHY, UNSPECIFIED: Chronic | Status: ACTIVE | Noted: 2020-12-28

## 2021-01-05 PROBLEM — N18.30 CHRONIC KIDNEY DISEASE, STAGE 3 UNSPECIFIED: Chronic | Status: ACTIVE | Noted: 2020-12-28

## 2021-01-05 PROBLEM — H54.40 BLINDNESS, ONE EYE, UNSPECIFIED EYE: Chronic | Status: ACTIVE | Noted: 2020-12-28

## 2021-01-05 PROBLEM — I25.110 ATHEROSCLEROTIC HEART DISEASE OF NATIVE CORONARY ARTERY WITH UNSTABLE ANGINA PECTORIS: Chronic | Status: ACTIVE | Noted: 2020-12-28

## 2021-01-05 PROCEDURE — 99284 EMERGENCY DEPT VISIT MOD MDM: CPT | Mod: 25

## 2021-01-05 PROCEDURE — 93926 LOWER EXTREMITY STUDY: CPT

## 2021-01-05 PROCEDURE — 93926 LOWER EXTREMITY STUDY: CPT | Mod: 26,LT

## 2021-01-05 PROCEDURE — 99284 EMERGENCY DEPT VISIT MOD MDM: CPT

## 2021-01-05 NOTE — ED ADULT NURSE NOTE - CAS ELECT INFOMATION PROVIDED
Health Maintenance Summary     Topic Due On Due Status Completed On    MAMMOGRAM - BREAST CANCER SCREENING Nov 29, 2018 Not Due Nov 29, 2016    Colorectal Cancer Screening - Colonoscopy Jul 14, 2018 Not Due Jul 14, 2008    Osteoporosis Screening  Completed Apr 12, 2017    Immunization-Zoster  Completed Mar 12, 2013    Immunization - Pneumococcal Mar 23, 2018 Not Due Mar 23, 2017    Immunization - TDAP Pregnancy  Hidden     Medicare Wellness Visit Oct 20, 2016 Overdue     IMMUNIZATION - DTaP/Tdap/Td Jun 16, 2020 Not Due Jun 16, 2010    Immunization-Influenza Sep 1, 2017 Not Due     Hepatitis C Screening Oct 20, 2002 Overdue         Here for pre-op for hysterectomy 9/11/17  Patient is due for topics as listed above, she wishes to discuss with provider .  Due for Hepatitis C Screen  
pt was discharged by PA

## 2021-01-05 NOTE — ED STATDOCS - CLINICAL SUMMARY MEDICAL DECISION MAKING FREE TEXT BOX
Patient presenting with pain and swelling to cardiac cath site. Will obtain duplex arterial sono to rule out complication. Patient presenting with pain and swelling to cardiac cath site. Will obtain duplex arterial sono to rule out complication .

## 2021-01-05 NOTE — ED ADULT NURSE NOTE - PSH
History of repair of hiatal hernia    S/P appendectomy    S/P breast reconstruction, bilateral    S/P CABG x 4  LIMA to the LAD, SVG to the RI, sequential SVG to the OM1 and dRCA, 2013 @ [facility]  S/P cardiac cath  with stents  S/P laparoscopic hysterectomy    S/P mastectomy    S/P tonsillectomy    Status post cataract extraction and insertion of intraocular lens, unspecified laterality

## 2021-01-05 NOTE — ED STATDOCS - PATIENT PORTAL LINK FT
You can access the FollowMyHealth Patient Portal offered by [facility] by registering at the following website: [email or web address]. By joining [employer]’s FollowMyHealth portal, you will also be able to view your health information using other applications (apps) compatible with our system.

## 2021-01-05 NOTE — ED STATDOCS - PROGRESS NOTE DETAILS
Pt moved form intake Room. Pt seen and evaluated by intake Physician. HPI, Physical examination performed by intake Physician . Note reviewed and followup examination performed by me consistent with initial assessment. Agrees with intake Physician plan and tests. Pt US + 4.5cm left groin hematoma. Pt was made aware of her results and she states understanding. Pt case was discussed with Dr. Garrett who recommends yunior to be marked off and have pt monitor area for expanding redness/ discoloration. PT have been advised to return to ED immediately if above findings are noted.

## 2021-01-05 NOTE — ED STATDOCS - SKIN, MLM
ecchymosis to cath site with palpable hematoma over femoral vessels, no bleeding or pulsatile masses; skin normal color for race, warm, dry and intact.

## 2021-01-05 NOTE — ED STATDOCS - PHYSICAL EXAMINATION
ecchymosis to cath site with palpable hematoma over femoral vessels, no bleeding or pulsatile masses.

## 2021-01-05 NOTE — ED STATDOCS - OBJECTIVE STATEMENT
71 y/o female with PMHx of Anemia, Blind left eye, Breast cancer  Chemo and radiation, CVA due to embolism of left middle cerebral artery, CAD involving native coronary artery of native heart with unstable angina pector, HTN, GERD with esophagitis, unspecified whether hemorrhage, Glaucoma, H/O diabetic retinopathy  vision loss L eye, Heart failure with preserved ejection fraction, Hiatal hernia, MI, Hypercholesterolemia, Hypothyroid, Microalbuminuria, Stage 3 chronic kidney disease, unspecified whether stage 3a or 3b CKD, TIA, and Type 1 diabetes mellitus with diabetic neuropathy presents to ED c/o groin pain. Patient reports pain at the site of her recent cardiac catheterization.     Denies CP, difficulty breathing, bleeding

## 2021-01-05 NOTE — ED STATDOCS - CHPI ED RELIEVING FACTORS
[de-identified] : [facility] # [id number]\par \par Patient is a 56 y.o Cymro speaking F w/ PMH HTN, morbid obesity, chronic CML (dx 2017, on imatinib)who presents for a follow up bp check. She was last seen in 5/3/19 for her annual CPE and her dose of lisinopril 40 daily was continued. She reports that she does not check her bp a home. She denies symptoms of headaches, blurry vision, CP, or SOB. \par \par She admits to vomiting on average every 15 days due to her imatinib. She states that recently, she has been feeling nauseas with the need to vomit but is unable to. When she does vomit, her emesis is non bloody and non bilious. She denies any throat pain, abdominal pain, or melena. Her BM are regular and formed. She has an appointment with Gastroenterology w/ Dr. Arrington on 11/19/19. \par \par \par \par 
nothing

## 2021-01-05 NOTE — ED STATDOCS - NSFOLLOWUPINSTRUCTIONS_ED_ALL_ED_FT
Monitor area for expanding redness/ discoloration.   Please  return to ED immediately if above findings are noted.  F/U with Cardiology as discussed

## 2021-01-05 NOTE — ED STATDOCS - PMH
Anemia, unspecified type    Blind left eye    Breast cancer  Chemo and radiation  Cerebrovascular accident (CVA) due to embolism of left middle cerebral artery    Coronary artery disease involving native coronary artery of native heart with unstable angina pector    Essential hypertension    Gastroesophageal reflux disease with esophagitis, unspecified whether hemorrhage    Glaucoma    H/O diabetic retinopathy  vision loss L eye  Heart failure with preserved ejection fraction    Hiatal hernia    History of myocardial infarction    Hypercholesterolemia    Hypothyroid    Microalbuminuria    Stage 3 chronic kidney disease, unspecified whether stage 3a or 3b CKD    Transient ischemic attack    Type 1 diabetes mellitus with diabetic neuropathy

## 2021-01-08 ENCOUNTER — EMERGENCY (EMERGENCY)
Facility: HOSPITAL | Age: 71
LOS: 1 days | Discharge: HOME | End: 2021-01-08
Attending: EMERGENCY MEDICINE
Payer: MEDICARE

## 2021-01-08 VITALS
SYSTOLIC BLOOD PRESSURE: 167 MMHG | HEIGHT: 62 IN | DIASTOLIC BLOOD PRESSURE: 78 MMHG | RESPIRATION RATE: 18 BRPM | HEART RATE: 73 BPM | OXYGEN SATURATION: 98 % | WEIGHT: 190.04 LBS | TEMPERATURE: 99 F

## 2021-01-08 DIAGNOSIS — Z90.710 ACQUIRED ABSENCE OF BOTH CERVIX AND UTERUS: Chronic | ICD-10-CM

## 2021-01-08 DIAGNOSIS — Z98.89 OTHER SPECIFIED POSTPROCEDURAL STATES: Chronic | ICD-10-CM

## 2021-01-08 DIAGNOSIS — Z90.10 ACQUIRED ABSENCE OF UNSPECIFIED BREAST AND NIPPLE: Chronic | ICD-10-CM

## 2021-01-08 DIAGNOSIS — Z95.1 PRESENCE OF AORTOCORONARY BYPASS GRAFT: Chronic | ICD-10-CM

## 2021-01-08 DIAGNOSIS — Z98.890 OTHER SPECIFIED POSTPROCEDURAL STATES: Chronic | ICD-10-CM

## 2021-01-08 DIAGNOSIS — Z98.49 CATARACT EXTRACTION STATUS, UNSPECIFIED EYE: Chronic | ICD-10-CM

## 2021-01-08 LAB
ALBUMIN SERPL ELPH-MCNC: 3.9 G/DL — SIGNIFICANT CHANGE UP (ref 3.3–5.2)
ALP SERPL-CCNC: 127 U/L — HIGH (ref 40–120)
ALT FLD-CCNC: 12 U/L — SIGNIFICANT CHANGE UP
ANION GAP SERPL CALC-SCNC: 10 MMOL/L — SIGNIFICANT CHANGE UP (ref 5–17)
APTT BLD: 35.3 SEC — SIGNIFICANT CHANGE UP (ref 27.5–35.5)
AST SERPL-CCNC: 20 U/L — SIGNIFICANT CHANGE UP
BASOPHILS # BLD AUTO: 0.04 K/UL — SIGNIFICANT CHANGE UP (ref 0–0.2)
BASOPHILS NFR BLD AUTO: 0.5 % — SIGNIFICANT CHANGE UP (ref 0–2)
BILIRUB SERPL-MCNC: 0.3 MG/DL — LOW (ref 0.4–2)
BUN SERPL-MCNC: 17 MG/DL — SIGNIFICANT CHANGE UP (ref 8–20)
CALCIUM SERPL-MCNC: 9.5 MG/DL — SIGNIFICANT CHANGE UP (ref 8.6–10.2)
CHLORIDE SERPL-SCNC: 104 MMOL/L — SIGNIFICANT CHANGE UP (ref 98–107)
CO2 SERPL-SCNC: 24 MMOL/L — SIGNIFICANT CHANGE UP (ref 22–29)
CREAT SERPL-MCNC: 0.9 MG/DL — SIGNIFICANT CHANGE UP (ref 0.5–1.3)
EOSINOPHIL # BLD AUTO: 0.06 K/UL — SIGNIFICANT CHANGE UP (ref 0–0.5)
EOSINOPHIL NFR BLD AUTO: 0.8 % — SIGNIFICANT CHANGE UP (ref 0–6)
GLUCOSE SERPL-MCNC: 193 MG/DL — HIGH (ref 70–99)
HCT VFR BLD CALC: 35.4 % — SIGNIFICANT CHANGE UP (ref 34.5–45)
HGB BLD-MCNC: 11.1 G/DL — LOW (ref 11.5–15.5)
IMM GRANULOCYTES NFR BLD AUTO: 0.3 % — SIGNIFICANT CHANGE UP (ref 0–1.5)
INR BLD: 0.99 RATIO — SIGNIFICANT CHANGE UP (ref 0.88–1.16)
LYMPHOCYTES # BLD AUTO: 1.66 K/UL — SIGNIFICANT CHANGE UP (ref 1–3.3)
LYMPHOCYTES # BLD AUTO: 22.6 % — SIGNIFICANT CHANGE UP (ref 13–44)
MCHC RBC-ENTMCNC: 28.2 PG — SIGNIFICANT CHANGE UP (ref 27–34)
MCHC RBC-ENTMCNC: 31.4 GM/DL — LOW (ref 32–36)
MCV RBC AUTO: 90.1 FL — SIGNIFICANT CHANGE UP (ref 80–100)
MONOCYTES # BLD AUTO: 0.54 K/UL — SIGNIFICANT CHANGE UP (ref 0–0.9)
MONOCYTES NFR BLD AUTO: 7.4 % — SIGNIFICANT CHANGE UP (ref 2–14)
NEUTROPHILS # BLD AUTO: 5.02 K/UL — SIGNIFICANT CHANGE UP (ref 1.8–7.4)
NEUTROPHILS NFR BLD AUTO: 68.4 % — SIGNIFICANT CHANGE UP (ref 43–77)
PLATELET # BLD AUTO: 202 K/UL — SIGNIFICANT CHANGE UP (ref 150–400)
POTASSIUM SERPL-MCNC: 4.3 MMOL/L — SIGNIFICANT CHANGE UP (ref 3.5–5.3)
POTASSIUM SERPL-SCNC: 4.3 MMOL/L — SIGNIFICANT CHANGE UP (ref 3.5–5.3)
PROT SERPL-MCNC: 7.6 G/DL — SIGNIFICANT CHANGE UP (ref 6.6–8.7)
PROTHROM AB SERPL-ACNC: 11.5 SEC — SIGNIFICANT CHANGE UP (ref 10.6–13.6)
RBC # BLD: 3.93 M/UL — SIGNIFICANT CHANGE UP (ref 3.8–5.2)
RBC # FLD: 14.1 % — SIGNIFICANT CHANGE UP (ref 10.3–14.5)
SODIUM SERPL-SCNC: 138 MMOL/L — SIGNIFICANT CHANGE UP (ref 135–145)
WBC # BLD: 7.34 K/UL — SIGNIFICANT CHANGE UP (ref 3.8–10.5)
WBC # FLD AUTO: 7.34 K/UL — SIGNIFICANT CHANGE UP (ref 3.8–10.5)

## 2021-01-08 PROCEDURE — 99284 EMERGENCY DEPT VISIT MOD MDM: CPT

## 2021-01-08 PROCEDURE — 99283 EMERGENCY DEPT VISIT LOW MDM: CPT | Mod: GC

## 2021-01-08 PROCEDURE — 74174 CTA ABD&PLVS W/CONTRAST: CPT | Mod: 26

## 2021-01-08 NOTE — ED PROVIDER NOTE - CLINICAL SUMMARY MEDICAL DECISION MAKING FREE TEXT BOX
Pt is a 70y F with recent cardiac catheterization on Dec. 28th presenting for worsening hematoma to L groin. Will obtain CTA and reassess.

## 2021-01-08 NOTE — ED PROVIDER NOTE - OBJECTIVE STATEMENT
Pt is a 70y female with PMHx of Anemia, Blind left eye, Breast cancer  Chemo and radiation, CVA due to embolism of left middle cerebral artery, CAD involving native coronary artery of native heart with unstable angina pector, HTN, GERD with esophagitis, unspecified whether hemorrhage, Glaucoma, H/O diabetic retinopathy  vision loss L eye, Heart failure with preserved ejection fraction, Hiatal hernia, MI, Hypercholesterolemia, Hypothyroid, Microalbuminuria, Stage 3 chronic kidney disease, unspecified whether stage 3a or 3b CKD, TIA, and Type 1 diabetes mellitus with diabetic neuropathy presenting to ED for increased pain and swelling to L groin s/p cardiac catheterization on 12/28. Pt states she was seen here a few days ago and had an US but it since has gotten worse. She denies any fever/ smoking/ abd pain/chest pain/nausea/vomiting.

## 2021-01-08 NOTE — ED PROVIDER NOTE - PATIENT PORTAL LINK FT
You can access the FollowMyHealth Patient Portal offered by [facility] by registering at the following website: [email or web address]. By joining [employer]’s FollowMyHealth portal, you will also be able to view your health information using other applications (apps) compatible with our system.

## 2021-01-08 NOTE — CONSULT NOTE ADULT - SUBJECTIVE AND OBJECTIVE BOX
VASCULAR SURGERY CONSULT    HPI: Patient is a 69yo F with extensive medical history presenting for a second time to the ED with hematoma and pain on L femoral site s/p balloon angioplasty of LE. Patient reports procedure had been performed  on 12/29/20 due to complains of LE pain. Since the procedure patient noticed a bulge on the access site and recently she reports it has increased in size and it continues to be sore. Patient denies having drainage from the site. Denies fevers chills chest pain sob, n/v, or generalized malaise.      PAST MEDICAL HISTORY:  History of repair of hiatal hernia    Microalbuminuria    Anemia, unspecified type    History of myocardial infarction    Coronary artery disease involving native coronary artery of native heart with unstable angina pector    Stage 3 chronic kidney disease, unspecified whether stage 3a or 3b CKD    Heart failure with preserved ejection fraction    Gastroesophageal reflux disease with esophagitis, unspecified whether hemorrhage    Essential hypertension    Type 1 diabetes mellitus with diabetic neuropathy    Transient ischemic attack    Cerebrovascular accident (CVA) due to embolism of left middle cerebral artery    Blind left eye    H/O diabetic retinopathy    Hiatal hernia    Breast cancer    Glaucoma    CVA (cerebral vascular accident)    Diabetes mellitus    Hypothyroid    Hypercholesterolemia    Coronary artery disease    HTN (hypertension)        PAST SURGICAL HISTORY:  S/P laparoscopic hysterectomy    Status post cataract extraction and insertion of intraocular lens, unspecified laterality    History of repair of hiatal hernia    S/P hernia repair    S/P tonsillectomy    S/P appendectomy    S/P cardiac cath    S/P CABG x 4    S/P breast reconstruction, bilateral    S/P mastectomy        ALLERGIES:  No Known Allergies      VITALS & I/Os:  Vital Signs Last 24 Hrs  T(C): 37.3 (08 Jan 2021 12:39), Max: 37.3 (08 Jan 2021 12:39)  T(F): 99.2 (08 Jan 2021 12:39), Max: 99.2 (08 Jan 2021 12:39)  HR: 73 (08 Jan 2021 12:39) (73 - 73)  BP: 167/78 (08 Jan 2021 12:39) (167/78 - 167/78)  BP(mean): --  RR: 18 (08 Jan 2021 12:39) (18 - 18)  SpO2: 98% (08 Jan 2021 12:39) (98% - 98%)  CAPILLARY BLOOD GLUCOSE      POCT Blood Glucose.: 197 mg/dL (08 Jan 2021 20:35)      I&O's Summary      GENERAL: Alert, well developed, in no acute distress.  MENTAL STATUS: AAOx3. Appropriate affect.  HEENT: PERRLA. EOMI. MMM.  Trachea midline.   RESPIRATORY: non-labored breathing or conversational dyspnea  CARDIOVASCULAR: RRR.   GASTROINTESTINAL: Abdomen soft, NT, ND, -R/-G.  L groin with ecchymosis, palpable femoral pulse, palpable small collection, mildly tender to palpation.  VASCULAR: No cyanosis or clubbing. Palpable DP/PT +2 b/l  EXT: No gross deformities      LABS:                        11.1   7.34  )-----------( 202      ( 08 Jan 2021 14:44 )             35.4     01-08    138  |  104  |  17.0  ----------------------------<  193<H>  4.3   |  24.0  |  0.90    Ca    9.5      08 Jan 2021 14:44    TPro  7.6  /  Alb  3.9  /  TBili  0.3<L>  /  DBili  x   /  AST  20  /  ALT  12  /  AlkPhos  127<H>  01-08    Lactate:    PT/INR - ( 08 Jan 2021 14:44 )   PT: 11.5 sec;   INR: 0.99 ratio         PTT - ( 08 Jan 2021 14:44 )  PTT:35.3 sec    IMAGING:  CT Angio Abdomen and Pelvis w/ IV Cont (01.08.21 @ 18:06)    IMPRESSION:  1. 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 There is adjacent 4 x 4.9 x 2.5 cm soft tissue hematoma. Correlate with rupture of this pseudoaneurysm.

## 2021-01-08 NOTE — ED PROVIDER NOTE - ATTENDING CONTRIBUTION TO CARE
AJM: pt woth worsening swelling to cath site. recent workup showed hematoma. will obtain cta to assess for vascular injury

## 2021-01-08 NOTE — CONSULT NOTE ADULT - ASSESSMENT
Patient is a 69yo F with multiple comorbities s/p RLE balloon angioplasty in 12/29 presenting with access site hematoma and possible 5mm pseudoaneurysm per CTA    -Recommend US of RLE assess flow to possible pseudoaneurysm   -Pain control   -Vascular surgery to follow results

## 2021-01-08 NOTE — ED PROVIDER NOTE - CARE PROVIDER_API CALL
ManvarSingh, Pallavi B (MD)  Vascular Surgery  [address] [place] [address], [address] [place] [address]  Phone: [phone]  Fax: [phone]  Follow Up Time:

## 2021-01-08 NOTE — ED ADULT TRIAGE NOTE - NS ED TRIAGE AVPU SCALE
Just FYI. Maybe a little aricept added in will counter the zonisamide Alert-The patient is alert, awake and responds to voice. The patient is oriented to time, place, and person. The triage nurse is able to obtain subjective information.

## 2021-01-08 NOTE — ED PROVIDER NOTE - PROGRESS NOTE DETAILS
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 Vascular consult called and SS cards called to inform them of their patients condition. SS cardiology informed of results and they do not recommend anything at this time other than vascular consult. Awaiting vascular recommendations. Pt had LLE arterial duplex performed awaiting additional consult from surgery. Pt fed, Lantus given and gabapentin for pts night dose do to length of stay. Will dispo after speaking to surgery. Pt doing well. Just spoke with Surgery Resident Samantha Osorio. Pt cleared to go home and follow up out pt with Dr Salmeron in 2 weeks

## 2021-01-08 NOTE — ED PROVIDER NOTE - NSFOLLOWUPINSTRUCTIONS_ED_ALL_ED_FT
Return to ED with any new concerns or worsening symptoms.     Apply heat compress to area as tolerated.     Follow up with Dr Salmeron in 2 weeks        An aneurysm is a bulge in an artery. A pseudoaneurysm happens when an artery is injured and blood leaks out and forms a sac-like bulge in the surrounding tissues.      What are the causes?    The most common cause of this condition is a procedure called an angiogram. During this procedure, a small, thin tube (catheter) is inserted into an artery. After an angiogram, the insertion site on the artery should close back up all the way. If it does not, blood may leak out of the artery.  Other causes of a pseudoaneurysm include:  •Trauma to the walls of an artery, such as from a stabbing injury or a deep cut.      •Bypass artery grafting surgery, which is a type of surgery that makes blood flow to the heart better.      •An infection that affects the walls of an artery.      •A heart attack (myocardial infarction).        What are the signs or symptoms?  Symptoms of this condition include:  •Pain, soreness, or tenderness at the site of the pseudoaneurysm.      •Swelling.      •Bruising or a change in skin color.      •A throbbing mass or lump at the site.        How is this diagnosed?  This condition may be diagnosed based on:  •Your symptoms.      •A physical exam.      •An imaging test called a Doppler ultrasound. This imaging test uses sound waves to show the blood flow in the arteries and the pseudoaneurysm.        How is this treated?  This condition may go away on its own without treatment. To help prevent bleeding that cannot be controlled, or to help prevent other problems, your health care provider may suggest one of these treatments:  •Injecting a blood-clotting enzyme, such as thrombin, into the site.      •Fixing the artery with surgery.      •Putting pressure (compression) on the pseudoaneurysm.        Follow these instructions at home:    •Take over-the-counter and prescription medicines only as told by your health care provider.      •Return to your normal activities as told by your health care provider. Ask your health care provider what activities are safe for you.      •Keep all follow-up visits as told by your health care provider. This is important.        Contact a health care provider if:    •Your pain, soreness, or tenderness at the pseudoaneurysm site keeps getting worse.      •You have swelling at the site.        Get help right away if:    •You have severe or ongoing (persistent) pain at the site of the pseudoaneurysm.      •There is bleeding or drainage from the site.      •The part of your body where the pseudoaneurysm is located changes color or becomes painful, cold, or numb.      •You have chest pain or shortness of breath.      •You feel like you might faint or you faint.        Summary    •A pseudoaneurysm happens when an artery is injured and blood leaks out to form a sac-like bulge.      •The most common cause of this condition is a procedure called an angiogram in which a thin tube (catheter) is inserted into an artery.      •This condition may go away on its own without treatment.      •Take over-the-counter and prescription medicines only as told by your health care provider.      •Get help right away if the part of your body where the pseudoaneurysm is located changes color or becomes painful, cold, or numb.

## 2021-01-09 VITALS
HEART RATE: 72 BPM | OXYGEN SATURATION: 98 % | RESPIRATION RATE: 18 BRPM | DIASTOLIC BLOOD PRESSURE: 80 MMHG | TEMPERATURE: 99 F | SYSTOLIC BLOOD PRESSURE: 143 MMHG

## 2021-01-09 PROCEDURE — 80053 COMPREHEN METABOLIC PANEL: CPT

## 2021-01-09 PROCEDURE — 99284 EMERGENCY DEPT VISIT MOD MDM: CPT | Mod: 25

## 2021-01-09 PROCEDURE — 85610 PROTHROMBIN TIME: CPT

## 2021-01-09 PROCEDURE — 93926 LOWER EXTREMITY STUDY: CPT | Mod: 26,LT

## 2021-01-09 PROCEDURE — 74174 CTA ABD&PLVS W/CONTRAST: CPT

## 2021-01-09 PROCEDURE — 93926 LOWER EXTREMITY STUDY: CPT

## 2021-01-09 PROCEDURE — 85025 COMPLETE CBC W/AUTO DIFF WBC: CPT

## 2021-01-09 PROCEDURE — 36415 COLL VENOUS BLD VENIPUNCTURE: CPT

## 2021-01-09 PROCEDURE — 85730 THROMBOPLASTIN TIME PARTIAL: CPT

## 2021-01-09 PROCEDURE — 82962 GLUCOSE BLOOD TEST: CPT

## 2021-01-09 RX ORDER — INSULIN GLARGINE 100 [IU]/ML
20 INJECTION, SOLUTION SUBCUTANEOUS AT BEDTIME
Refills: 0 | Status: DISCONTINUED | OUTPATIENT
Start: 2021-01-09 | End: 2021-01-13

## 2021-01-09 RX ORDER — GABAPENTIN 400 MG/1
100 CAPSULE ORAL ONCE
Refills: 0 | Status: COMPLETED | OUTPATIENT
Start: 2021-01-09 | End: 2021-01-09

## 2021-01-09 RX ADMIN — GABAPENTIN 100 MILLIGRAM(S): 400 CAPSULE ORAL at 05:22

## 2021-01-09 RX ADMIN — INSULIN GLARGINE 20 UNIT(S): 100 INJECTION, SOLUTION SUBCUTANEOUS at 00:50

## 2021-01-25 NOTE — ED PROVIDER NOTE - CROS ED NEURO ALL NEG
negative... Minocycline Counseling: Patient advised regarding possible photosensitivity and discoloration of the teeth, skin, lips, tongue and gums.  Patient instructed to avoid sunlight, if possible.  When exposed to sunlight, patients should wear protective clothing, sunglasses, and sunscreen.  The patient was instructed to call the office immediately if the following severe adverse effects occur:  hearing changes, easy bruising/bleeding, severe headache, or vision changes.  The patient verbalized understanding of the proper use and possible adverse effects of minocycline.  All of the patient's questions and concerns were addressed.

## 2021-01-27 ENCOUNTER — EMERGENCY (EMERGENCY)
Facility: HOSPITAL | Age: 71
LOS: 1 days | Discharge: HOME | End: 2021-01-27
Attending: EMERGENCY MEDICINE
Payer: MEDICARE

## 2021-01-27 VITALS
SYSTOLIC BLOOD PRESSURE: 147 MMHG | HEART RATE: 88 BPM | WEIGHT: 190.04 LBS | DIASTOLIC BLOOD PRESSURE: 73 MMHG | RESPIRATION RATE: 18 BRPM | OXYGEN SATURATION: 97 % | HEIGHT: 62 IN | TEMPERATURE: 99 F

## 2021-01-27 DIAGNOSIS — Z90.10 ACQUIRED ABSENCE OF UNSPECIFIED BREAST AND NIPPLE: Chronic | ICD-10-CM

## 2021-01-27 DIAGNOSIS — Z95.1 PRESENCE OF AORTOCORONARY BYPASS GRAFT: Chronic | ICD-10-CM

## 2021-01-27 DIAGNOSIS — Z98.890 OTHER SPECIFIED POSTPROCEDURAL STATES: Chronic | ICD-10-CM

## 2021-01-27 DIAGNOSIS — Z98.49 CATARACT EXTRACTION STATUS, UNSPECIFIED EYE: Chronic | ICD-10-CM

## 2021-01-27 DIAGNOSIS — Z98.89 OTHER SPECIFIED POSTPROCEDURAL STATES: Chronic | ICD-10-CM

## 2021-01-27 DIAGNOSIS — Z90.710 ACQUIRED ABSENCE OF BOTH CERVIX AND UTERUS: Chronic | ICD-10-CM

## 2021-01-27 PROCEDURE — 99283 EMERGENCY DEPT VISIT LOW MDM: CPT

## 2021-01-27 RX ADMIN — Medication 1 TABLET(S): at 16:47

## 2021-01-27 NOTE — ED PROVIDER NOTE - NSCAREINITIATED _GEN_ER
Patient intubated. Vent management per CC. Lactic 5.3, BNP- 27,   Troponin pending  Levophed ordered  Vanc/Zosyn ordered  IVF infusing  Electrolyte replacement protocols initiated.  Hypothermia protocol initiated       David Ram(Attending)

## 2021-01-27 NOTE — ED PROVIDER NOTE - OBJECTIVE STATEMENT
Pertinent PMH/PSH/FHx/SHx and Review of Systems contained within:  Patient presents to the ED for lower lip edema that is mild for 3 days with pain in the lip and jaw that is mild.  edontulous but wears poor fitting dentures.  PMH HTN, HLD, DM.  no trauma.  otherwise basleine.  Non toxic.  Well appearing. No aggravating or relieving factors. No other pertinent PMH.  No other pertinent PSH.  No other pertinent FHx.  Patient denies EtOH/tobacco/illicit substance use. No fever/chills, No photophobia/eye pain/changes in vision, No ear pain/sore throat/dysphagia, No chest pain/palpitations, no SOB/cough/wheeze/stridor, No abdominal pain, No N/V/D, no dysuria/frequency/discharge, No neck/back pain, no rash, no changes in neurological status/function.

## 2021-01-27 NOTE — ED PROVIDER NOTE - PHYSICAL EXAMINATION
Gen: Alert, NAD  Head: NC, AT, PERRL, EOMI, normal lids/conjunctiva  ENT: mild lower lip edema with slight discomfort and mild TTP lower gumline without obvious edema, normal hearing, patent oropharynx without erythema/exudate, uvula midline  Neck: +supple, no tenderness/meningismus/JVD, +Trachea midline  Pulm: Bilateral BS, normal resp effort, no wheeze/stridor/retractions  CV: RRR, no M/R/G, +dist pulses  Abd: soft, NT/ND, +BS, no hepatosplenomegaly  Mskel: no edema/erythema/cyanosis  Skin: no rash  Neuro: AAOx3, no gross sensory/motor deficits,

## 2021-01-27 NOTE — ED PROVIDER NOTE - CLINICAL SUMMARY MEDICAL DECISION MAKING FREE TEXT BOX
Patient with apparent mild dental infection.  VSS.  will treat.  will see dentist.  will return if worsening.  good teachback.  no ACE inhibitors.  normal airway.  Uneventful ED observation period. Non toxic.  Well appearing. Patient given prescription medications for their condition and advised to take them as prescribed and check with their Primary Care Provider if any questions arise. Discussed results and outcome of testing with the patient.  Patient advised to please follow up with their primary care doctor within the next 24 hours and return to the Emergency Department for worsening symptoms or any other concerns.  Patient advised that their doctor may call  to follow up on the specific results of the tests performed today in the emergency department.

## 2021-02-08 NOTE — H&P PST ADULT - PROBLEM/PLAN-2
Patient was wanting results of her MRI and Xray. Told patient that pain management would have to release those results to her. Patient verbalized understanding.    DISPLAY PLAN FREE TEXT

## 2022-03-11 ENCOUNTER — OUTPATIENT (OUTPATIENT)
Dept: OUTPATIENT SERVICES | Facility: HOSPITAL | Age: 72
LOS: 1 days | End: 2022-03-11
Payer: MEDICARE

## 2022-03-11 DIAGNOSIS — Z90.710 ACQUIRED ABSENCE OF BOTH CERVIX AND UTERUS: Chronic | ICD-10-CM

## 2022-03-11 DIAGNOSIS — Z98.49 CATARACT EXTRACTION STATUS, UNSPECIFIED EYE: Chronic | ICD-10-CM

## 2022-03-11 DIAGNOSIS — Z98.890 OTHER SPECIFIED POSTPROCEDURAL STATES: Chronic | ICD-10-CM

## 2022-03-11 DIAGNOSIS — Z98.89 OTHER SPECIFIED POSTPROCEDURAL STATES: Chronic | ICD-10-CM

## 2022-03-11 DIAGNOSIS — Z90.10 ACQUIRED ABSENCE OF UNSPECIFIED BREAST AND NIPPLE: Chronic | ICD-10-CM

## 2022-03-11 DIAGNOSIS — Z01.818 ENCOUNTER FOR OTHER PREPROCEDURAL EXAMINATION: ICD-10-CM

## 2022-03-11 DIAGNOSIS — N63.20 UNSPECIFIED LUMP IN THE LEFT BREAST, UNSPECIFIED QUADRANT: ICD-10-CM

## 2022-03-11 DIAGNOSIS — Z95.1 PRESENCE OF AORTOCORONARY BYPASS GRAFT: Chronic | ICD-10-CM

## 2022-03-11 LAB
A1C WITH ESTIMATED AVERAGE GLUCOSE RESULT: 10.7 % — HIGH (ref 4–5.6)
ANION GAP SERPL CALC-SCNC: 4 MMOL/L — LOW (ref 5–17)
APTT BLD: 37.2 SEC — HIGH (ref 27.5–35.5)
BASOPHILS # BLD AUTO: 0.02 K/UL — SIGNIFICANT CHANGE UP (ref 0–0.2)
BASOPHILS NFR BLD AUTO: 0.3 % — SIGNIFICANT CHANGE UP (ref 0–2)
BUN SERPL-MCNC: 29 MG/DL — HIGH (ref 7–23)
CALCIUM SERPL-MCNC: 9.1 MG/DL — SIGNIFICANT CHANGE UP (ref 8.5–10.1)
CHLORIDE SERPL-SCNC: 108 MMOL/L — SIGNIFICANT CHANGE UP (ref 96–108)
CO2 SERPL-SCNC: 26 MMOL/L — SIGNIFICANT CHANGE UP (ref 22–31)
CREAT SERPL-MCNC: 1.39 MG/DL — HIGH (ref 0.5–1.3)
EGFR: 40 ML/MIN/1.73M2 — LOW
EOSINOPHIL # BLD AUTO: 0.05 K/UL — SIGNIFICANT CHANGE UP (ref 0–0.5)
EOSINOPHIL NFR BLD AUTO: 0.8 % — SIGNIFICANT CHANGE UP (ref 0–6)
ESTIMATED AVERAGE GLUCOSE: 260 MG/DL — HIGH (ref 68–114)
GLUCOSE SERPL-MCNC: 442 MG/DL — HIGH (ref 70–99)
HCT VFR BLD CALC: 34 % — LOW (ref 34.5–45)
HGB BLD-MCNC: 10.7 G/DL — LOW (ref 11.5–15.5)
IMM GRANULOCYTES NFR BLD AUTO: 0.3 % — SIGNIFICANT CHANGE UP (ref 0–1.5)
INR BLD: 0.97 RATIO — SIGNIFICANT CHANGE UP (ref 0.88–1.16)
LYMPHOCYTES # BLD AUTO: 1.14 K/UL — SIGNIFICANT CHANGE UP (ref 1–3.3)
LYMPHOCYTES # BLD AUTO: 19.3 % — SIGNIFICANT CHANGE UP (ref 13–44)
MCHC RBC-ENTMCNC: 27.5 PG — SIGNIFICANT CHANGE UP (ref 27–34)
MCHC RBC-ENTMCNC: 31.5 GM/DL — LOW (ref 32–36)
MCV RBC AUTO: 87.4 FL — SIGNIFICANT CHANGE UP (ref 80–100)
MONOCYTES # BLD AUTO: 0.48 K/UL — SIGNIFICANT CHANGE UP (ref 0–0.9)
MONOCYTES NFR BLD AUTO: 8.1 % — SIGNIFICANT CHANGE UP (ref 2–14)
NEUTROPHILS # BLD AUTO: 4.19 K/UL — SIGNIFICANT CHANGE UP (ref 1.8–7.4)
NEUTROPHILS NFR BLD AUTO: 71.2 % — SIGNIFICANT CHANGE UP (ref 43–77)
PLATELET # BLD AUTO: 139 K/UL — LOW (ref 150–400)
POTASSIUM SERPL-MCNC: 4.5 MMOL/L — SIGNIFICANT CHANGE UP (ref 3.5–5.3)
POTASSIUM SERPL-SCNC: 4.5 MMOL/L — SIGNIFICANT CHANGE UP (ref 3.5–5.3)
PROTHROM AB SERPL-ACNC: 11.2 SEC — SIGNIFICANT CHANGE UP (ref 10.5–13.4)
RBC # BLD: 3.89 M/UL — SIGNIFICANT CHANGE UP (ref 3.8–5.2)
RBC # FLD: 15.3 % — HIGH (ref 10.3–14.5)
SODIUM SERPL-SCNC: 138 MMOL/L — SIGNIFICANT CHANGE UP (ref 135–145)
WBC # BLD: 5.9 K/UL — SIGNIFICANT CHANGE UP (ref 3.8–10.5)
WBC # FLD AUTO: 5.9 K/UL — SIGNIFICANT CHANGE UP (ref 3.8–10.5)

## 2022-03-11 PROCEDURE — 83036 HEMOGLOBIN GLYCOSYLATED A1C: CPT

## 2022-03-11 PROCEDURE — 85610 PROTHROMBIN TIME: CPT

## 2022-03-11 PROCEDURE — 85025 COMPLETE CBC W/AUTO DIFF WBC: CPT

## 2022-03-11 PROCEDURE — 80048 BASIC METABOLIC PNL TOTAL CA: CPT

## 2022-03-11 PROCEDURE — 85730 THROMBOPLASTIN TIME PARTIAL: CPT

## 2022-03-11 PROCEDURE — 93010 ELECTROCARDIOGRAM REPORT: CPT

## 2022-03-11 PROCEDURE — 36415 COLL VENOUS BLD VENIPUNCTURE: CPT

## 2022-03-11 PROCEDURE — 93005 ELECTROCARDIOGRAM TRACING: CPT

## 2022-03-11 RX ORDER — GABAPENTIN 400 MG/1
1 CAPSULE ORAL
Qty: 0 | Refills: 0 | DISCHARGE

## 2022-03-11 RX ORDER — LOSARTAN POTASSIUM 100 MG/1
1 TABLET, FILM COATED ORAL
Qty: 0 | Refills: 0 | DISCHARGE

## 2022-03-11 NOTE — ASU PATIENT PROFILE, ADULT - FALL HARM RISK - UNIVERSAL INTERVENTIONS
Bed in lowest position, wheels locked, appropriate side rails in place/Call bell, personal items and telephone in reach/Instruct patient to call for assistance before getting out of bed or chair/Non-slip footwear when patient is out of bed/[place] to call system/Physically safe environment - no spills, clutter or unnecessary equipment/Purposeful Proactive Rounding/Room/bathroom lighting operational, light cord in reach

## 2022-03-11 NOTE — ASU PATIENT PROFILE, ADULT - PACKS YRS CALCULATION
Received new referral to the [facility]ication Management clinic for Diabetes Management. Referral from rounding Physician- Dr. Joseph Goodwin. Patient's Primary Care Physician is Dr. Glenda Cavanaugh at [address]. Called and spoke with Sedrick Torres regarding new referral. She will give Dr. Glenda Cavanaugh the referral forms to see if he will sign for management. Per discussion, will wait for approval prior to scheduling patient. Faxed consult agreement and referral forms, receipt confirmed. Spoke with Logan Echeverria, Case Management at [facility]. Notified her that the clinic is waiting on approval from Dr. Glenda Cavanaugh to schedule patient for appointment.    Xu Restrepo, Pharm D, [facility]  2/4/2022 11:37 AM
3

## 2022-03-11 NOTE — ASU PATIENT PROFILE, ADULT - NSICDXPASTMEDICALHX_GEN_ALL_CORE_FT
PAST MEDICAL HISTORY:  Anemia, unspecified type     Blind left eye     Breast cancer Chemo and radiation    Cerebrovascular accident (CVA) due to embolism of left middle cerebral artery     Coronary artery disease involving native coronary artery of native heart with unstable angina pector     Essential hypertension     Gastroesophageal reflux disease with esophagitis, unspecified whether hemorrhage     Glaucoma     H/O diabetic retinopathy vision loss L eye    Heart failure with preserved ejection fraction     Hiatal hernia     History of myocardial infarction     HTN (hypertension)     Hypercholesterolemia     Hypothyroid     Microalbuminuria     Neuropathy     Ovarian cancer     Stage 3 chronic kidney disease, unspecified whether stage 3a or 3b CKD     Stented coronary artery x 5 stents last 2019    Transient ischemic attack     Type 1 diabetes mellitus with diabetic neuropathy

## 2022-03-11 NOTE — ASU PATIENT PROFILE, ADULT - NSICDXPASTSURGICALHX_GEN_ALL_CORE_FT
PAST SURGICAL HISTORY:  H/O shoulder surgery left " pins"    History of repair of hiatal hernia     S/P appendectomy     S/P breast reconstruction, bilateral SANKET flap    S/P CABG x 4 LIMA to the LAD, SVG to the RI, sequential SVG to the OM1 and dRCA, 2013 @ [facility]    S/P cardiac cath with stents    S/P laparoscopic hysterectomy due to ovaian cancer    S/P mastectomy     S/P tonsillectomy     Status post cataract extraction and insertion of intraocular lens, unspecified laterality

## 2022-03-12 DIAGNOSIS — Z01.818 ENCOUNTER FOR OTHER PREPROCEDURAL EXAMINATION: ICD-10-CM

## 2022-03-12 DIAGNOSIS — N63.20 UNSPECIFIED LUMP IN THE LEFT BREAST, UNSPECIFIED QUADRANT: ICD-10-CM

## 2022-10-20 PROBLEM — I10 ESSENTIAL (PRIMARY) HYPERTENSION: Chronic | Status: ACTIVE | Noted: 2022-03-11

## 2022-10-20 PROBLEM — C56.9 MALIGNANT NEOPLASM OF UNSPECIFIED OVARY: Chronic | Status: ACTIVE | Noted: 2022-03-11

## 2022-10-20 PROBLEM — Z95.5 PRESENCE OF CORONARY ANGIOPLASTY IMPLANT AND GRAFT: Chronic | Status: ACTIVE | Noted: 2022-03-11

## 2022-10-20 PROBLEM — G62.9 POLYNEUROPATHY, UNSPECIFIED: Chronic | Status: ACTIVE | Noted: 2022-03-11

## 2022-11-15 ENCOUNTER — OUTPATIENT (OUTPATIENT)
Dept: OUTPATIENT SERVICES | Facility: HOSPITAL | Age: 72
LOS: 1 days | End: 2022-11-15
Payer: MEDICARE

## 2022-11-15 DIAGNOSIS — Z98.49 CATARACT EXTRACTION STATUS, UNSPECIFIED EYE: Chronic | ICD-10-CM

## 2022-11-15 DIAGNOSIS — Z98.890 OTHER SPECIFIED POSTPROCEDURAL STATES: Chronic | ICD-10-CM

## 2022-11-15 DIAGNOSIS — Z98.89 OTHER SPECIFIED POSTPROCEDURAL STATES: Chronic | ICD-10-CM

## 2022-11-15 DIAGNOSIS — Z01.818 ENCOUNTER FOR OTHER PREPROCEDURAL EXAMINATION: ICD-10-CM

## 2022-11-15 DIAGNOSIS — Z95.1 PRESENCE OF AORTOCORONARY BYPASS GRAFT: Chronic | ICD-10-CM

## 2022-11-15 DIAGNOSIS — N63.20 UNSPECIFIED LUMP IN THE LEFT BREAST, UNSPECIFIED QUADRANT: ICD-10-CM

## 2022-11-15 DIAGNOSIS — Z90.710 ACQUIRED ABSENCE OF BOTH CERVIX AND UTERUS: Chronic | ICD-10-CM

## 2022-11-15 DIAGNOSIS — Z90.10 ACQUIRED ABSENCE OF UNSPECIFIED BREAST AND NIPPLE: Chronic | ICD-10-CM

## 2022-11-15 LAB
ANION GAP SERPL CALC-SCNC: 5 MMOL/L — SIGNIFICANT CHANGE UP (ref 5–17)
BASOPHILS # BLD AUTO: 0.04 K/UL — SIGNIFICANT CHANGE UP (ref 0–0.2)
BASOPHILS NFR BLD AUTO: 0.6 % — SIGNIFICANT CHANGE UP (ref 0–2)
BUN SERPL-MCNC: 30 MG/DL — HIGH (ref 7–23)
CALCIUM SERPL-MCNC: 9.6 MG/DL — SIGNIFICANT CHANGE UP (ref 8.5–10.1)
CHLORIDE SERPL-SCNC: 109 MMOL/L — HIGH (ref 96–108)
CO2 SERPL-SCNC: 27 MMOL/L — SIGNIFICANT CHANGE UP (ref 22–31)
CREAT SERPL-MCNC: 1.37 MG/DL — HIGH (ref 0.5–1.3)
EGFR: 41 ML/MIN/1.73M2 — LOW
EOSINOPHIL # BLD AUTO: 0.07 K/UL — SIGNIFICANT CHANGE UP (ref 0–0.5)
EOSINOPHIL NFR BLD AUTO: 1.1 % — SIGNIFICANT CHANGE UP (ref 0–6)
GLUCOSE SERPL-MCNC: 175 MG/DL — HIGH (ref 70–99)
HCT VFR BLD CALC: 36.3 % — SIGNIFICANT CHANGE UP (ref 34.5–45)
HGB BLD-MCNC: 11.4 G/DL — LOW (ref 11.5–15.5)
IMM GRANULOCYTES NFR BLD AUTO: 0.2 % — SIGNIFICANT CHANGE UP (ref 0–0.9)
LYMPHOCYTES # BLD AUTO: 1.76 K/UL — SIGNIFICANT CHANGE UP (ref 1–3.3)
LYMPHOCYTES # BLD AUTO: 26.6 % — SIGNIFICANT CHANGE UP (ref 13–44)
MCHC RBC-ENTMCNC: 27.7 PG — SIGNIFICANT CHANGE UP (ref 27–34)
MCHC RBC-ENTMCNC: 31.4 GM/DL — LOW (ref 32–36)
MCV RBC AUTO: 88.3 FL — SIGNIFICANT CHANGE UP (ref 80–100)
MONOCYTES # BLD AUTO: 0.51 K/UL — SIGNIFICANT CHANGE UP (ref 0–0.9)
MONOCYTES NFR BLD AUTO: 7.7 % — SIGNIFICANT CHANGE UP (ref 2–14)
NEUTROPHILS # BLD AUTO: 4.23 K/UL — SIGNIFICANT CHANGE UP (ref 1.8–7.4)
NEUTROPHILS NFR BLD AUTO: 63.8 % — SIGNIFICANT CHANGE UP (ref 43–77)
PLATELET # BLD AUTO: 155 K/UL — SIGNIFICANT CHANGE UP (ref 150–400)
POTASSIUM SERPL-MCNC: 3.9 MMOL/L — SIGNIFICANT CHANGE UP (ref 3.5–5.3)
POTASSIUM SERPL-SCNC: 3.9 MMOL/L — SIGNIFICANT CHANGE UP (ref 3.5–5.3)
RBC # BLD: 4.11 M/UL — SIGNIFICANT CHANGE UP (ref 3.8–5.2)
RBC # FLD: 14.7 % — HIGH (ref 10.3–14.5)
SODIUM SERPL-SCNC: 141 MMOL/L — SIGNIFICANT CHANGE UP (ref 135–145)
WBC # BLD: 6.62 K/UL — SIGNIFICANT CHANGE UP (ref 3.8–10.5)
WBC # FLD AUTO: 6.62 K/UL — SIGNIFICANT CHANGE UP (ref 3.8–10.5)

## 2022-11-15 PROCEDURE — 80048 BASIC METABOLIC PNL TOTAL CA: CPT

## 2022-11-15 PROCEDURE — 83036 HEMOGLOBIN GLYCOSYLATED A1C: CPT

## 2022-11-15 PROCEDURE — 93005 ELECTROCARDIOGRAM TRACING: CPT

## 2022-11-15 PROCEDURE — 36415 COLL VENOUS BLD VENIPUNCTURE: CPT

## 2022-11-15 PROCEDURE — 85025 COMPLETE CBC W/AUTO DIFF WBC: CPT

## 2022-11-15 PROCEDURE — 93010 ELECTROCARDIOGRAM REPORT: CPT

## 2022-11-15 RX ORDER — INSULIN ASPART 100 [IU]/ML
10 INJECTION, SOLUTION SUBCUTANEOUS
Qty: 0 | Refills: 0 | DISCHARGE

## 2022-11-15 RX ORDER — LEVOTHYROXINE SODIUM 125 MCG
1 TABLET ORAL
Qty: 0 | Refills: 0 | DISCHARGE

## 2022-11-15 RX ORDER — INSULIN GLARGINE 100 [IU]/ML
25 INJECTION, SOLUTION SUBCUTANEOUS
Qty: 0 | Refills: 0 | DISCHARGE

## 2022-11-15 RX ORDER — INSULIN GLARGINE 100 [IU]/ML
15 INJECTION, SOLUTION SUBCUTANEOUS
Qty: 0 | Refills: 0 | DISCHARGE

## 2022-11-15 RX ORDER — GABAPENTIN 400 MG/1
1 CAPSULE ORAL
Qty: 0 | Refills: 0 | DISCHARGE

## 2022-11-15 RX ORDER — ATORVASTATIN CALCIUM 80 MG/1
1 TABLET, FILM COATED ORAL
Qty: 0 | Refills: 0 | DISCHARGE

## 2022-11-15 NOTE — ASU PATIENT PROFILE, ADULT - NSICDXPASTMEDICALHX_GEN_ALL_CORE_FT
PAST MEDICAL HISTORY:  Anemia, unspecified type     Arthritis     Blind left eye     Breast cancer Bilateral , surgery , chemo --dx ~1984    Cataract, left eye     Cerebrovascular accident (CVA) due to embolism of left middle cerebral artery Residual right sided weakness    Coronary artery disease involving native coronary artery of native heart with unstable angina pector     Gastroesophageal reflux disease with esophagitis, unspecified whether hemorrhage     Glaucoma     H/O diabetic retinopathy vision loss L eye    Heart failure with preserved ejection fraction     Hiatal hernia     History of myocardial infarction     HTN (hypertension)     Hypercholesterolemia     Hypothyroid     Microalbuminuria     Neuropathy     Ovarian cancer     Stage 3 chronic kidney disease, unspecified whether stage 3a or 3b CKD     Stented coronary artery x 5 stents last 2019    Transient ischemic attack     Type 1 diabetes mellitus with diabetic neuropathy

## 2022-11-15 NOTE — ASU PATIENT PROFILE, ADULT - NSICDXPASTSURGICALHX_GEN_ALL_CORE_FT
PAST SURGICAL HISTORY:  H/O shoulder surgery left " pins"    History of repair of hiatal hernia     S/P appendectomy     S/P breast reconstruction, bilateral SANKET flap--1984    S/P CABG x 4 LIMA to the LAD, SVG to the RI, sequential SVG to the OM1 and dRCA, 2013 @     S/P cardiac cath with stents --total 5 last ~ 2019  Last Cardiac Cath ~3-4 months ago - no intervention    S/P laparoscopic hysterectomy due to ovaian cancer    S/P mastectomy Bilateral --1984    S/P tonsillectomy     Status post cataract extraction and insertion of intraocular lens, unspecified laterality Left

## 2022-11-16 DIAGNOSIS — N63.20 UNSPECIFIED LUMP IN THE LEFT BREAST, UNSPECIFIED QUADRANT: ICD-10-CM

## 2022-11-16 DIAGNOSIS — Z01.818 ENCOUNTER FOR OTHER PREPROCEDURAL EXAMINATION: ICD-10-CM

## 2022-11-16 LAB
A1C WITH ESTIMATED AVERAGE GLUCOSE RESULT: 9 % — HIGH (ref 4–5.6)
ESTIMATED AVERAGE GLUCOSE: 212 MG/DL — HIGH (ref 68–114)

## 2022-11-16 NOTE — CHART NOTE - NSCHARTNOTEFT_GEN_A_CORE
Elevated glucose and HGB AIC from blood work done in PST 11/15/2021. Faxed to PCP, Surgeon and Endocrinologist. Known history of DM.

## 2022-12-21 ENCOUNTER — OFFICE (OUTPATIENT)
Dept: URBAN - METROPOLITAN AREA CLINIC 6 | Facility: CLINIC | Age: 72
Setting detail: OPHTHALMOLOGY
End: 2022-12-21
Payer: COMMERCIAL

## 2022-12-21 DIAGNOSIS — Z96.1: ICD-10-CM

## 2022-12-21 DIAGNOSIS — H34.12: ICD-10-CM

## 2022-12-21 DIAGNOSIS — H35.373: ICD-10-CM

## 2022-12-21 PROCEDURE — 92134 CPTRZ OPH DX IMG PST SGM RTA: CPT | Performed by: OPHTHALMOLOGY

## 2022-12-21 PROCEDURE — 99213 OFFICE O/P EST LOW 20 MIN: CPT | Performed by: OPHTHALMOLOGY

## 2022-12-21 ASSESSMENT — REFRACTION_MANIFEST
OS_ADD: +2.50
OD_SPHERE: PLANO
OD_ADD: +2.50
OS_VA1: 20/NLP
OS_SPHERE: BALANCE
OD_CYLINDER: SPH
OD_VA1: 20/25-2

## 2022-12-21 ASSESSMENT — REFRACTION_CURRENTRX
OD_AXIS: 85
OD_CYLINDER: -0.50
OD_VPRISM_DIRECTION: PROGS
OD_SPHERE: +0.50
OS_SPHERE: +0.25
OS_CYLINDER: -0.25
OD_OVR_VA: 20/
OS_AXIS: 150
OS_ADD: +2.50
OS_OVR_VA: 20/
OD_ADD: +2.50

## 2022-12-21 ASSESSMENT — KERATOMETRY
METHOD_AUTO_MANUAL: AUTO
OD_K1POWER_DIOPTERS: 43.56
OD_AXISANGLE_DEGREES: 7
OS_AXISANGLE_DEGREES: 142
OS_K2POWER_DIOPTERS: 44.25
OS_K1POWER_DIOPTERS: 43.50
OD_K2POWER_DIOPTERS: 43.75

## 2022-12-21 ASSESSMENT — REFRACTION_AUTOREFRACTION
OS_CYLINDER: -0.25
OD_AXIS: 174
OD_SPHERE: -0.25
OS_SPHERE: -0.50
OS_AXIS: 167
OD_CYLINDER: -0.75

## 2022-12-21 ASSESSMENT — AXIALLENGTH_DERIVED
OS_AL: 23.6981
OD_AL: 23.78

## 2022-12-21 ASSESSMENT — TONOMETRY
OS_IOP_MMHG: 20
OD_IOP_MMHG: 20

## 2022-12-21 ASSESSMENT — SPHEQUIV_DERIVED
OS_SPHEQUIV: -0.625
OD_SPHEQUIV: -0.625

## 2022-12-21 ASSESSMENT — VISUAL ACUITY
OD_BCVA: NLP
OS_BCVA: 20/20-2

## 2022-12-21 ASSESSMENT — CONFRONTATIONAL VISUAL FIELD TEST (CVF)
OD_FINDINGS: FULL
OS_FINDINGS: FULL

## 2023-01-17 NOTE — ED PROVIDER NOTE - OBJECTIVE STATEMENT
pt presents with no acute complaints responded tro amp d50 by ems she is compliant with insulin admits to decreased po intake currently denies fever. denies HA or neck pain. no chest pain or sob. no abd pain. no n/v/d. no urinary f/u/d. no back pain. no motor or sensory deficits. denies drug use. no recent travel. no rash. no other acute issues symptoms or concerns   she villa being on any sulfonyureas Intro Statement (Will Not Render If Left Blank): Physician Orders: Plan: On Treatment Visit (OTV) with Ultrasound\\nPlan: Renu Goyal is undergoing superficial radiation therapy for Basal Cell Carcinoma Nodular skin cancer for the [employer] and presents for weekly evaluation and management. Per protocol and as documented on the flow sheet, the patient was questioned as to subjective redness, pruritus, pain, drainage, fatigue, or any other symptoms. Objectively, the radiation area was evaluated with regards to erythema, atrophy, scale, crusting, erosion, ulceration, edema, purpura, tenderness, warmth, drainage, and any other findings. The plan was extensively reviewed including dose and dosing schedule. The simulation and clinical setup were also reviewed as were external and any internal shields and based on this review the appropriateness and sufficiency of treatment was determined.\\nA high-frequency ultrasound image was acquired today for a two-dimensional evaluation of the tumor volume, depth, width, breadth, review of prior response to treatment, provide geometric accuracy of field placement, and determine whether to proceed with therapeutic delivery.\\n\\nHigh frequency ultrasound depth is 0.85mm, which is +0.04mm in difference from previous imaging.\\n\\nPer Dr. Cristiano Beebe, continued ultrasound guidance and therapeutic radiology simulation-aided field setting verification per fraction is required for field placement, measurement of tumor depth, tissues evaluation, progress, acute effect monitoring, and determination for therapeutic treatment delivery is appropriate. Intro Statement (Will Not Render If Left Blank): Physician Orders: Plan: On Treatment Visit (OTV) with Ultrasound\\nPlan: Renu Goyal is undergoing superficial radiation therapy for Basal Cell Carcinoma Nodular skin cancer for the Right [employer] and presents for weekly evaluation and management. Per protocol and as documented on the flow sheet, the patient was questioned as to subjective redness, pruritus, pain, drainage, fatigue, or any other symptoms. Objectively, the radiation area was evaluated with regards to erythema, atrophy, scale, crusting, erosion, ulceration, edema, purpura, tenderness, warmth, drainage, and any other findings. The plan was extensively reviewed including dose and dosing schedule. The simulation and clinical setup were also reviewed as were external and any internal shields and based on this review the appropriateness and sufficiency of treatment was determined.\\nA high-frequency ultrasound image was acquired today for a two-dimensional evaluation of the tumor volume, depth, width, breadth, review of prior response to treatment, provide geometric accuracy of field placement, and determine whether to proceed with therapeutic delivery.\\n\\nHigh frequency ultrasound depth is 0.85mm, which is +0.04mm in difference from previous imaging.\\n\\nPer Dr. Cristiano Beebe, continued ultrasound guidance and therapeutic radiology simulation-aided field setting verification per fraction is required for field placement, measurement of tumor depth, tissues evaluation, progress, acute effect monitoring, and determination for therapeutic treatment delivery is appropriate.

## 2023-06-21 ENCOUNTER — OFFICE (OUTPATIENT)
Dept: URBAN - METROPOLITAN AREA CLINIC 6 | Facility: CLINIC | Age: 73
Setting detail: OPHTHALMOLOGY
End: 2023-06-21
Payer: MEDICARE

## 2023-06-21 DIAGNOSIS — H43.393: ICD-10-CM

## 2023-06-21 DIAGNOSIS — E11.9: ICD-10-CM

## 2023-06-21 DIAGNOSIS — H34.12: ICD-10-CM

## 2023-06-21 DIAGNOSIS — H35.373: ICD-10-CM

## 2023-06-21 DIAGNOSIS — H52.4: ICD-10-CM

## 2023-06-21 DIAGNOSIS — Z96.1: ICD-10-CM

## 2023-06-21 DIAGNOSIS — H43.812: ICD-10-CM

## 2023-06-21 PROCEDURE — 92014 COMPRE OPH EXAM EST PT 1/>: CPT | Performed by: OPHTHALMOLOGY

## 2023-06-21 PROCEDURE — 92250 FUNDUS PHOTOGRAPHY W/I&R: CPT | Performed by: OPHTHALMOLOGY

## 2023-06-21 PROCEDURE — 92015 DETERMINE REFRACTIVE STATE: CPT | Performed by: OPHTHALMOLOGY

## 2023-06-21 ASSESSMENT — VISUAL ACUITY
OD_BCVA: NLP
OS_BCVA: 20/25-2

## 2023-06-21 ASSESSMENT — REFRACTION_AUTOREFRACTION
OD_SPHERE: +0.25
OS_AXIS: 45
OS_SPHERE: +0.75
OS_CYLINDER: -1.25
OD_AXIS: 135
OD_CYLINDER: -0.50

## 2023-06-21 ASSESSMENT — KERATOMETRY
OD_K1POWER_DIOPTERS: 43.25
OD_AXISANGLE_DEGREES: 88
OS_K2POWER_DIOPTERS: 43.50
OD_K2POWER_DIOPTERS: 43.75
OS_AXISANGLE_DEGREES: 105
METHOD_AUTO_MANUAL: AUTO
OS_K1POWER_DIOPTERS: 42.75

## 2023-06-21 ASSESSMENT — REFRACTION_CURRENTRX
OD_SPHERE: +0.50
OD_ADD: +2.75
OS_OVR_VA: 20/
OD_CYLINDER: -0.75
OS_AXIS: 149
OS_VPRISM_DIRECTION: PROGS
OD_OVR_VA: 20/
OD_VPRISM_DIRECTION: PROGS
OD_AXIS: 79
OS_ADD: +2.75
OS_CYLINDER: -0.25
OS_SPHERE: +0.25

## 2023-06-21 ASSESSMENT — REFRACTION_MANIFEST
OS_VA1: 20/NLP
OD_AXIS: 135
OD_VA1: 20/20-1
OD_CYLINDER: -0.50
OS_SPHERE: BALANCE
OD_ADD: +2.50
OD_SPHERE: +0.25

## 2023-06-21 ASSESSMENT — AXIALLENGTH_DERIVED
OD_AL: 23.5919
OS_AL: 23.6813
OD_AL: 23.5919

## 2023-06-21 ASSESSMENT — TONOMETRY
OD_IOP_MMHG: 13
OS_IOP_MMHG: 15

## 2023-06-21 ASSESSMENT — SPHEQUIV_DERIVED
OS_SPHEQUIV: 0.125
OD_SPHEQUIV: 0
OD_SPHEQUIV: 0

## 2023-06-21 ASSESSMENT — CONFRONTATIONAL VISUAL FIELD TEST (CVF)
OD_FINDINGS: FULL
OS_FINDINGS: FULL

## 2023-07-17 PROBLEM — I63.412 CEREBRAL INFARCTION DUE TO EMBOLISM OF LEFT MIDDLE CEREBRAL ARTERY: Chronic | Status: ACTIVE | Noted: 2020-12-28

## 2023-07-17 PROBLEM — H26.9 UNSPECIFIED CATARACT: Chronic | Status: ACTIVE | Noted: 2022-11-15

## 2023-07-17 PROBLEM — M19.90 UNSPECIFIED OSTEOARTHRITIS, UNSPECIFIED SITE: Chronic | Status: ACTIVE | Noted: 2022-11-15

## 2023-08-11 ENCOUNTER — APPOINTMENT (OUTPATIENT)
Dept: BREAST CENTER | Facility: CLINIC | Age: 73
End: 2023-08-11

## 2024-04-27 NOTE — PHYSICAL THERAPY INITIAL EVALUATION ADULT - PERSONAL SAFETY AND JUDGMENT, REHAB EVAL
Mother states pt felt a little bad last weekend, but felt better throughout the week   pt started again last night feeling bad with sore throat and swollen lymph nodes.    Mother states pt has been snoring bad as well     intact

## 2024-04-30 ENCOUNTER — OFFICE (OUTPATIENT)
Dept: URBAN - METROPOLITAN AREA CLINIC 6 | Facility: CLINIC | Age: 74
Setting detail: OPHTHALMOLOGY
End: 2024-04-30
Payer: MEDICARE

## 2024-04-30 DIAGNOSIS — H35.373: ICD-10-CM

## 2024-04-30 PROBLEM — H43.813 POSTERIOR VITREOUS DETACHMENT ; BOTH EYES: Status: ACTIVE | Noted: 2024-04-30

## 2024-04-30 PROCEDURE — 92012 INTRM OPH EXAM EST PATIENT: CPT | Performed by: OPHTHALMOLOGY

## 2024-04-30 PROCEDURE — 92134 CPTRZ OPH DX IMG PST SGM RTA: CPT | Performed by: OPHTHALMOLOGY

## 2024-05-19 NOTE — CHART NOTE - NSCHARTNOTEFT_GEN_A_CORE
71 year old female with mass left lateral abdominal area; pt said she has had it for years however it has been increasing in size and is painful when she lays on her left side; she presents to PST for planned removal of mass left flank 71 year old female with mass left lateral abdominal area; pt said she has had it for years however it has been increasing in size and is painful when she lays on her left side; she presents to PST for planned removal of mass left flank    Plan:  1. PST instructions given ; NPO status/  instructions to be given by ASU   2. Pt instructed to take following meds on day of surgery: amlodipine levothyroxine hydralazine isosorbide aspirin   3. Pt instructed to take routine evening medications unless indicated   4. Stop NSAIDS ( Aspirin Alev Motrin Mobic Diclofenac), herbal supplements , MVI , Vitamin fish oil 7 days prior to surgery  unless   directed by surgeon or cardiologist;   5. Cardiac Optimization  with Dr Ibrahim  6. EZ wash instructions given   7. Labs EKG  as per surgeon request   8. Pt instructed to self quarantine after Covid test   9. Covid Testing scheduled Pt notified and aware  10. Pt denies covid symptoms shortness of breath fever cough no abdominal pain, no bloating, no constipation, no diarrhea, no nausea and no vomiting.

## 2024-05-23 ENCOUNTER — APPOINTMENT (OUTPATIENT)
Dept: OTOLARYNGOLOGY | Facility: CLINIC | Age: 74
End: 2024-05-23
Payer: MEDICARE

## 2024-05-23 VITALS
WEIGHT: 190 LBS | BODY MASS INDEX: 34.96 KG/M2 | HEIGHT: 62 IN | HEART RATE: 98 BPM | SYSTOLIC BLOOD PRESSURE: 172 MMHG | DIASTOLIC BLOOD PRESSURE: 74 MMHG

## 2024-05-23 DIAGNOSIS — K14.8 OTHER DISEASES OF TONGUE: ICD-10-CM

## 2024-05-23 DIAGNOSIS — H61.20 IMPACTED CERUMEN, UNSPECIFIED EAR: ICD-10-CM

## 2024-05-23 DIAGNOSIS — K13.79 OTHER LESIONS OF ORAL MUCOSA: ICD-10-CM

## 2024-05-23 DIAGNOSIS — H61.23 IMPACTED CERUMEN, BILATERAL: ICD-10-CM

## 2024-05-23 PROCEDURE — 69210 REMOVE IMPACTED EAR WAX UNI: CPT

## 2024-05-23 PROCEDURE — 31575 DIAGNOSTIC LARYNGOSCOPY: CPT

## 2024-05-23 PROCEDURE — 99202 OFFICE O/P NEW SF 15 MIN: CPT | Mod: 25

## 2024-05-23 RX ORDER — HYDROCHLOROTHIAZIDE 12.5 MG/1
TABLET ORAL
Refills: 0 | Status: ACTIVE | COMMUNITY

## 2024-05-23 RX ORDER — CLOPIDOGREL 75 MG/1
TABLET, FILM COATED ORAL
Refills: 0 | Status: ACTIVE | COMMUNITY

## 2024-05-23 RX ORDER — LOSARTAN POTASSIUM 50 MG/1
50 TABLET, FILM COATED ORAL
Refills: 0 | Status: COMPLETED | COMMUNITY
End: 2024-05-23

## 2024-05-24 PROBLEM — K13.79 ORAL BLEEDING: Status: ACTIVE | Noted: 2024-05-24

## 2024-05-24 PROBLEM — K14.8 HEMORRHAGE OF TONGUE: Status: ACTIVE | Noted: 2024-05-24

## 2024-05-24 PROBLEM — H61.23 BILATERAL IMPACTED CERUMEN: Status: ACTIVE | Noted: 2024-05-24

## 2024-05-24 PROBLEM — H61.20 IMPACTED CERUMEN, UNSPECIFIED LATERALITY: Status: ACTIVE | Noted: 2024-05-24

## 2024-05-24 NOTE — HISTORY OF PRESENT ILLNESS
[de-identified] : 73F presenting with bleeding from tongue. She reports that 3 weeks ago, she had 2 episodes over 2 days of self limited bleeding. She believes it was from the left and central portions of her oral tongue. She denies trauma. She reports bleeding was self limited. She endorses that it was bright red blood and not blood tinged sputum. She wears dentures upper and lower. Former smoker - quit 40 years ago. Non drinker. No change in way dentures fit. Denies otalgia.

## 2024-05-24 NOTE — PLAN
[TextEntry] : I do not see any masses or lesions that explain her 2 episodes of bleeding. She denies pain or changes in the fitting of her dentures. I have asked her to please monitor these symptoms closely and to call back if there is any concerning findings. We discussed the possibility of imaging, but will hold off at this time.   Cerumen cleared without difficulty.  Fu PRN.

## 2024-05-24 NOTE — PHYSICAL EXAM
[Normal] : mucosa is normal [Midline] : trachea located in midline position [Edentulous] : edentulous [de-identified] : impacted bilaterally, cleared [de-identified] : no masses or lesions or concerns for bleeding sites

## 2024-05-24 NOTE — CONSULT LETTER
[Dear  ___] : Dear  [unfilled], [Consult Letter:] : I had the pleasure of evaluating your patient, [unfilled]. [Please see my note below.] : Please see my note below. [Consult Closing:] : Thank you very much for allowing me to participate in the care of this patient.  If you have any questions, please do not hesitate to contact me. [Sincerely,] : Sincerely, [FreeTextEntry3] : Ever Bonilla MD, [facility]  Otolaryngology  Sinus and Endoscopic Skull Base Surgery  Head and Neck Surgery   [address], [address]  [place] [address]  Tel: [phone]  Fax:[phone]  HERMELINDO Funes, PA-C Department of Otolaryngology [facility] Otolaryngology at [place]

## 2024-05-24 NOTE — PROCEDURE
[de-identified] : NPL: Nasopharynx clear without masses Base of tongue without masses or lesions Vallecula clear Pyriforms clear Epiglottis crisp TVFs mobile bilaterally  Glottic airway patent [Cerumen Impaction] : Cerumen Impaction [Same] : same as the Pre Op Dx. [] : Removal of Cerumen

## 2024-08-07 PROBLEM — H52.4 PRESBYOPIA ; RIGHT EYE: Status: ACTIVE | Noted: 2024-08-07

## 2024-09-06 NOTE — PHYSICAL THERAPY INITIAL EVALUATION ADULT - ADDITIONAL COMMENTS
Pt lives with family in a 1 story house with 3 steps to enter.  Family works, pt independent with all PTA, without devices. No

## 2024-09-13 NOTE — ASU PREOP CHECKLIST - SITE MARKED BY SURGEON
Postoperative Visit    History of Present Illness:   Tony is 6 weeks s/p right shoulder arthroscopy  with revision cuff repair with regeneten - retear of subscap and supraspinstus. Had previous biceps tenodesis  (DOS-7/23/24)  He is doing well -- pain is well controlled  Has been doing therapy since I saw him last visit - did not start shoulder work until last week    Physical Examination:    NAD  right upper extremity:  AROM , ER 40   Strength deferref     Assessment/Plan:  44 y.o. male 6  weeks s/p right shoulder arthroscopy  with revision cuff repair with regeneten - retear of subscap and supraspinstus. Had previous biceps tenodesis  (DOS-7/23/24)    Plan  Ok to drive  OK to dc sling   Continue PT. No lifting until 12 weeks  Follow up in 6 weeks       All questions were answered in detail. The patient is in full agreement with the treatment plan and will proceed accordingly.     n/a

## 2024-09-30 NOTE — H&P PST ADULT - WEIGHT IN KG
Progress Note - Behavioral Health   Name: Deyvi Cao 54 y.o. male I MRN: [id number]   Unit/Bed#: [facility] 101-02 I Date of Admission: 6/25/2024   Date of Service: 9/30/2024 I Hospital Day: 97     Assessment & Plan  Bipolar disorder with severe depression (HCC)  Progressing  Awaiting placement - CRR interview complete, ACT interview complete, awaiting next steps  Continue medications as follows:, Vraylar 3mg PO Daily, Atarax 25mg PO TID, Lamictal 50mg PO Daily, Zoloft 150mg PO QHS  Continue with group therapy, milieu therapy and occupational therapy   Behavioral Health checks every 7 minutes   Continue frequent safety checks and vitals per unit protocol  Continue with [facility] medical management as indicated  Benign essential hypertension  Managed by [facility]  Continue Lisinopril 10mg PO Daily  Mixed hyperlipidemia  Managed by [facility]  Continue Lipitor 10mg PO Daily  Allergic rhinitis due to allergen  Managed by [facility]  Rosacea  Managed by [facility]    Subjective:    Patient was seen today for continuation of care, records reviewed and patient was discussed with the morning case review team.    Deyvi was seen today for psychiatric follow-up.  On assessment today, Deyvi was found sitting in his room. He is quiet on approach.  Reports mild anxiety and mild depression.  Deyvi reports adequate daytime energy and denies any difficulties with initiating or staying asleep.  Oral appetite and hydration is adequate.   We reviewed once more the specific as-needed medications they can use going forward if they experience any insomnia or destabilization of their mood, they understood and were agreeable. Milieu visibility and group attendance encouraged to promote an active participation in treatment.    Deyvi denies acute suicidal/self-harm ideation/intent/plan upon direct inquiry at this time. Deyvi is able to contract for safety while on the unit and would feel comfortable seeking staff support should suicidal symptoms or urges appear  or worsen. Deyvi remains behaviorally appropriate, no agitation or aggression noted on exam or in report. Deyvi also denies HI/AH/VH, and does not appear overtly manic.  Patient does not verbalize any experiences that can be categorized as paranoid, persecutory, bizarre, or somatic delusions. Deyvi remains adherent to his current psychotropic medication regimen and denies any side effects from medications, as well as none noted on exam.    Deyvi is currently assessed as being at their baseline with continued need for medication management, supervision for safety and ADL’s. These services are not currently available in a less restrictive environment necessitating continued hospital stay.  Deyvi should remain on the unit until these services are available, due to likelihood of mental decompensation and readmission if discharged to an unsupervised setting.  Assertive discharge planning and collaboration with multiple providers (inpatient and community based) remains ongoing.     Group Attendance: 4 / 10  Treatment Team: This Thursday  Psychiatric PRN's Needed: None    Review of Systems:  Behavior over the last 24 hours: Unchanged  Sleep: sleeping okay throughout the night  Appetite: adequate  Medication side effects: none reported  ROS:no complaints    Objective:    Vitals:  Vitals:    09/30/24 0731   BP: 146/74   Pulse: 91   Resp: 16   Temp: 97.8 °F (36.6 °C)   SpO2: 92%     Laboratory Results:  I have personally reviewed all pertinent laboratory/tests results.  Most Recent Labs:   Lab Results   Component Value Date    WBC 7.39 06/26/2024    RBC 4.70 06/26/2024    HGB 15.2 06/26/2024    HCT 45.5 06/26/2024     06/26/2024    RDW 12.9 06/26/2024    NEUTROABS 4.63 06/26/2024    SODIUM 137 06/26/2024    K 4.1 06/26/2024     06/26/2024    CO2 26 06/26/2024    BUN 17 06/26/2024    CREATININE 0.63 06/26/2024    GLUC 98 06/26/2024    GLUF 98 06/26/2024    CALCIUM 9.6 06/26/2024    AST 25 06/26/2024     ALT 45 06/26/2024    ALKPHOS 114 (H) 06/26/2024    TP 7.0 06/26/2024    ALB 4.4 06/26/2024    TBILI 0.44 06/26/2024    CHOLESTEROL 177 06/26/2024    HDL 52 06/26/2024    TRIG 73 06/26/2024    LDLCALC 110 (H) 06/26/2024    NONHDLC 125 06/26/2024    LITHIUM 0.4 (L) 01/28/2021    [id number] 2.636 06/26/2024    HGBA1C 5.2 11/22/2023     11/22/2023     Mental Status Evaluation:  Appearance:  age appropriate, casually dressed   Behavior:  pleasant, cooperative, calm   Speech:  normal rate, normal volume   Mood:  mildly anxious, mildly depressed   Affect:  constricted   Thought Process:  goal directed   Associations: intact associations   Thought Content:  no overt delusions   Perceptual Disturbances: no auditory hallucinations, no visual hallucinations, denies when asked, does not appear responding to internal stimuli   Risk Potential: Suicidal ideation - None at present, contracts for safety on the unit, would talk to staff if not feeling safe on the unit  Homicidal ideation - None at present  Potential for aggression - Not at present   Sensorium:  oriented to person, place, and time/date   Memory:  recent memory intact   Consciousness:  alert and awake   Attention/Concentration: attention span and concentration appear shorter than expected for age   Insight:  limited   Judgment: limited   Gait/Station: normal gait/station, normal balance   Motor Activity: no abnormal movements     Progress Toward Goals: making gradual improvement.  Deyvi continues to require inpatient psychiatric hospitalization for continued medication management and titration to optimize symptom reduction, improve sleep hygiene, and demonstrate adequate self-care.     Suicide/Homicide Risk Assessment:  Risk of Harm to Self:   Nursing Suicide Risk Assessment Last 24 hours: C-SSRS Risk (Since Last Contact)  Calculated C-SSRS Risk Score (Since Last Contact): No Risk Indicated    Risk of Harm to Others:  Nursing Homicide Risk Assessment:  Violence Risk to Others: Denies within past 6 months    Behavioral Health Medications: all current active meds have been reviewed and continue current psychiatric medications.  Current Facility-Administered Medications   Medication Dose Route Frequency Provider Last Rate    acetaminophen  650 mg Oral Q6H PRN ALVINA Harley      acetaminophen  650 mg Oral Q4H PRN ALVINA Harley      acetaminophen  975 mg Oral Q6H PRN ALVINA Harley      aluminum-magnesium hydroxide-simethicone  30 mL Oral Q4H PRN ALVINA Harley      ammonium lactate   Topical BID PRN ALVINA Harley      atorvastatin  10 mg Oral Daily ALVINA Lewis      benztropine  1 mg Intramuscular Q4H PRN Max 6/day ALVINA Harley      benztropine  1 mg Oral Q4H PRN Max 6/day ALVINA Harley      bisacodyl  10 mg Rectal Daily PRN ALVINA Harley      cariprazine  3 mg Oral Daily Martin Cardenas MD      Cholecalciferol  2,000 Units Oral Daily ALVINA Lewis      cyanocobalamin  1,000 mcg Oral Daily ALVINA Lewis      hydrOXYzine HCL  25 mg Oral Q6H PRN Max 4/day ALVINA Harley      hydrOXYzine HCL  25 mg Oral TID Martin Cardenas MD      hydrOXYzine HCL  50 mg Oral Q4H PRN Max 4/day ALVINA Harley      Or    LORazepam  1 mg Intramuscular Q4H PRN ALVINA Harley      lamoTRIgine  50 mg Oral Daily Martin Cardenas MD      lisinopril  10 mg Oral Daily ALVINA Lewis      LORazepam  1 mg Oral Q4H PRN Max 6/day ALVINA Harley      Or    LORazepam  2 mg Intramuscular Q6H PRN Max 3/day ALVINA Harley      OLANZapine  10 mg Oral Q3H PRN Max 3/day ALVINA Harley      Or    OLANZapine  10 mg Intramuscular Q3H PRN Max 3/day ALVINA Harley      OLANZapine  5 mg Oral Q3H PRN Max 6/day ALVINA Harley      Or    OLANZapine  5 mg Intramuscular Q3H PRN Max 6/day ALVINA Harley      OLANZapine  2.5 mg Oral Q3H PRN Max 8/day ALVINA Harley      polyethylene glycol   17 g Oral Daily PRN ALVINA Harley      propranolol  10 mg Oral Q8H PRN ALVINA Harley      senna-docusate sodium  1 tablet Oral Daily PRN ALVINA Harley      sertraline  150 mg Oral HS Martin Cardenas MD       Risks / Benefits of Treatment:  Risks, benefits, and possible side effects of medications explained to patient. Patient has limited understanding of risks and benefits of treatment at this time, but agrees to take medications as prescribed.    Counseling / Coordination of Care:  Total floor/unit time spent today 25 minutes. Greater than 50% of total time was spent with the patient and / or family counseling and / or coordination of care. A description of the counseling / coordination of care:   Patient's progress discussed with staff in treatment team meeting.  Medications, treatment progress and treatment plan reviewed with patient.   Educated on importance of medication and treatment compliance.  Reassurance and supportive therapy provided.   Encouraged participation in milieu and group therapy on the unit.    ALVINA Harley 09/30/24   86.2

## 2024-11-20 ENCOUNTER — OFFICE (OUTPATIENT)
Dept: URBAN - METROPOLITAN AREA CLINIC 6 | Facility: CLINIC | Age: 74
Setting detail: OPHTHALMOLOGY
End: 2024-11-20
Payer: MEDICARE

## 2024-11-20 DIAGNOSIS — Z96.1: ICD-10-CM

## 2024-11-20 DIAGNOSIS — H35.373: ICD-10-CM

## 2024-11-20 DIAGNOSIS — H26.492: ICD-10-CM

## 2024-11-20 DIAGNOSIS — H43.393: ICD-10-CM

## 2024-11-20 DIAGNOSIS — H34.12: ICD-10-CM

## 2024-11-20 DIAGNOSIS — E11.9: ICD-10-CM

## 2024-11-20 DIAGNOSIS — H43.813: ICD-10-CM

## 2024-11-20 PROCEDURE — 92250 FUNDUS PHOTOGRAPHY W/I&R: CPT | Performed by: OPHTHALMOLOGY

## 2024-11-20 PROCEDURE — 92014 COMPRE OPH EXAM EST PT 1/>: CPT | Performed by: OPHTHALMOLOGY

## 2024-11-20 ASSESSMENT — REFRACTION_MANIFEST
OD_VA1: 20/20-1
OS_AXIS: 055
OD_CYLINDER: -0.75
OD_ADD: +2.50
OD_SPHERE: +0.25
OS_SPHERE: +0.75
OD_AXIS: 170
OS_CYLINDER: -0.75
OS_VA1: 20/NLP

## 2024-11-20 ASSESSMENT — KERATOMETRY
OS_K2POWER_DIOPTERS: 43.50
OS_AXISANGLE_DEGREES: 138
OS_K1POWER_DIOPTERS: 43.00
OD_AXISANGLE_DEGREES: 086
OD_K1POWER_DIOPTERS: 43.00
OD_K2POWER_DIOPTERS: 45.00
METHOD_AUTO_MANUAL: AUTO

## 2024-11-20 ASSESSMENT — REFRACTION_AUTOREFRACTION
OD_SPHERE: +0.25
OS_SPHERE: +0.75
OS_AXIS: 055
OD_AXIS: 167
OS_CYLINDER: -0.75
OD_CYLINDER: -0.75

## 2024-11-20 ASSESSMENT — VISUAL ACUITY
OS_BCVA: 20/30
OD_BCVA: NLP

## 2024-11-20 ASSESSMENT — REFRACTION_CURRENTRX
OS_SPHERE: -0.25
OS_VPRISM_DIRECTION: PROGS
OD_VPRISM_DIRECTION: PROGS
OD_ADD: +2.25
OD_CYLINDER: -0.50
OD_OVR_VA: 20/
OS_CYLINDER: -2.50
OS_ADD: +2.25
OS_AXIS: 155
OD_SPHERE: -0.25
OS_OVR_VA: 20/
OD_AXIS: 155

## 2024-11-20 ASSESSMENT — TONOMETRY
OS_IOP_MMHG: 13
OD_IOP_MMHG: 14

## 2024-11-20 ASSESSMENT — CONFRONTATIONAL VISUAL FIELD TEST (CVF)
OS_FINDINGS: FULL
OD_FINDINGS: FULL

## 2025-01-10 NOTE — ED ADULT NURSE NOTE - SUICIDE SCREENING QUESTION 3
..  [facility]  Office: [phone]  Vidal Reza DO, Eugenio Lucio DO, Kuldeep Plummre DO, Jenaro Maguire DO, Giselle Novak MD, Merline Neely MD, Susan Mejia MD, Jodie Singleton MD,  Danny Carbajal MD, Doug Mora MD, Heath Barraza MD,  Jakob Cohen DO, Ghislaine Fermin MD, Juan Francisco Mercer MD, Luis Felipe Reza DO, Juany Mckinney MD,  Lonnie Davidson DO, Windy Duarte MD, Kerry Delgado MD, Wilda Tellez MD, Vijay Andrews MD,  Roney Roth MD, Geovany Guaman MD, Huber Hernandez MD, Nikky Thomas MD, Marko Khan MD, Nemo Salas MD, Eliazar Nance DO, Sagar Amos MD, Jakob Nation MD, Mohsin Reza, MD, Shirley Waterhouse, CNP,  Xiomy Hare CNP, Eliazar Santos, NATASHA,  Lorri Fritz, VINICIUS, Emily Blanco, CNP, Samantha Asif, CNP, Nicky Blunt CNP, Tricia Waddell, CNP, Cesia Walters, PA-C, Cheryle Suarez PA-C, Candida Chin, CNP, Vignesh Galaviz, CNP,  Karey Cordova, NATASHA, Denise Euceda, CNP, Cami Toribio, NATASHA,  Vandana Ortega CNP, Alicia Velazquez, CNP         [facility]   IN-PATIENT SERVICE   [facility]    HISTORY AND PHYSICAL EXAMINATION            Date:   1/10/2025  Patient name:  Batsheva Gonzalez  Date of admission:  1/9/2025  5:51 PM  MRN:   [id number]  Account:  [id number]  YOB: 1971  PCP:    Sherrell Carter MD  Room:   [address] [place] [address]  Code Status:    Full Code    Chief Complaint:     Chief Complaint   Patient presents with    Altered Mental Status     PT lives by herself, daughter checks on her every day. Pt daughter states she does not know when pt took her medication. Pt NEEDS  for consultation for self care at home.        History Obtained From:     patient, electronic medical record    History of Present Illness:     Batsheva Gonzalez is a 53 y.o.  /  female who presents with Altered Mental Status (PT lives by herself, daughter checks on her every day. Pt daughter states she does not know when pt took her  History:     Past Medical History:   Diagnosis Date    Adhesive capsulitis     Anxiety     Arthritis     Depression     Diabetes mellitus (HCC)     takes insulin (Dr. 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 Authorizing Provider   insulin glargine (BASAGLAR KWIKPEN) 100 UNIT/ML injection pen INJECT 44 UNITS UNDER THE SKIN EVERY MORNING 11/1/23   Sherrell Carter MD   glipiZIDE (GLUCOTROL XL) 10 MG extended release tablet TAKE ONE TABLET BY MOUTH ONCE DAILY 7/24/23   Ramon Venegas MD   atorvastatin (LIPITOR) 40 MG tablet TAKE ONE TABLET BY MOUTH ONCE DAILY 7/24/23   aRmon Venegas MD   JANUVIA 50 MG tablet TAKE ONE TABLET BY MOUTH ONCE DAILY 7/24/23   Ramon Venegas MD   lisinopril (PRINIVIL;ZESTRIL) 20 MG tablet TAKE ONE TABLET BY MOUTH ONCE DAILY 7/24/23   Ramon Venegas MD   HUMALOG KWIKPEN 100 UNIT/ML SOPN INJECT 10 UNITS INTO THE SKIN 3 TIMES DAILY BEFORE MEALS 10/26/22   Teja Florence MD    ASPIRIN EC LOW DOSE 81 MG EC tablet TAKE ONE TABLET BY MOUTH ONCE DAILY 9/19/22   Trang Velez MD   Multiple Vitamins-Iron (TAB-A-ANCA/IRON/BETA CAROTENE) TABS TAKE ONE TABLET BY MOUTH ONCE DAILY 9/19/22 10/19/22  Trang Velez MD   blood glucose test strips (TRUE METRIX BLOOD GLUCOSE TEST) strip TEST FOUR TIMES DAILY AS NEEDED FOR SYMPTOMS OF IRREGLUAR BLOOD GLUCOSE 1/31/22   Gabriela Akins DO   meloxicam  No

## 2025-01-31 NOTE — H&P ADULT - NSHPPHYSICALEXAM_GEN_ALL_CORE
Vitals  T(C): 36.9 (11-06-18 @ 01:17), Max: 37 (11-05-18 @ 19:17)  HR: 57 (11-06-18 @ 01:17) (57 - 75)  BP: 197/94 (11-06-18 @ 01:17) (175/79 - 223/99)  RR: 18 (11-06-18 @ 01:17) (18 - 20)  SpO2: 99% (11-06-18 @ 01:17) (96% - 100%)    Physical Exam:   GENERAL: NAD, well-groomed, well-developed  HEAD:  Atraumatic, Normocephalic  EYES: EOMI, PERRLA, conjunctiva and sclera clear  ENMT: No tonsillar erythema, exudates, or enlargement; Moist mucous membranes, Dentures in place, No lesions  NECK: Supple, No JVD, Normal thyroid  NERVOUS SYSTEM:  Alert & Oriented X3, Good concentration; Motor Strength 5/5 B/L upper and lower extremities; Mild dysarthria, Right partial lower face paralysis; CNs grossly except for the ones mentioned  RESP: Clear to auscultation bilaterally; No rales, rhonchi, or wheezing  CVS: Regular rate and rhythm; AS murmur, No rubs, or gallops, Chest pain reproducible with palpation  GI: Large 5-6cm cystic mass under left breast, LArge but Soft abdomen, mildly tender to palpation throughout, Bowel sounds present  EXTREMITIES:  2+ Peripheral Pulses, No clubbing, cyanosis, or edema, onychomycosis in all fingernails, swollen and pink coloration of peripheral digits of the hand  LYMPH: No cervical or supraclavicular lymphadenopathy noted  SKIN: No rashes or lesions Vitals  T(C): 36.9 (11-06-18 @ 01:17), Max: 37 (11-05-18 @ 19:17)  HR: 57 (11-06-18 @ 01:17) (57 - 75)  BP: 197/94 (11-06-18 @ 01:17) (175/79 - 223/99)  RR: 18 (11-06-18 @ 01:17) (18 - 20)  SpO2: 99% (11-06-18 @ 01:17) (96% - 100%)    Physical Exam:   GENERAL: NAD, well-groomed, well-developed  HEAD:  Atraumatic, Normocephalic  EYES: EOMI, PERRLA, conjunctiva and sclera clear  ENMT: No tonsillar erythema, exudates, or enlargement; Moist mucous membranes, Dentures in place, No lesions  NECK: Supple, No JVD, Normal thyroid  NERVOUS SYSTEM:  Alert & Oriented X3, Good concentration; Motor Strength 5/5 B/L upper and lower extremities; Mild dysarthria, Right partial lower face paralysis; CNs grossly except for the ones mentioned  RESP: Clear to auscultation bilaterally; No rales, rhonchi, or wheezing  CVS: Regular rate and rhythm; AS murmur, No rubs, or gallops, Midline scar, Chest pain reproducible with palpation  GI: Large 5-6cm cystic mass under left breast, LArge but Soft abdomen, mildly tender to palpation throughout, Bowel sounds present  EXTREMITIES:  2+ Peripheral Pulses, No clubbing, cyanosis, or edema, onychomycosis in all fingernails, swollen and pink coloration of peripheral digits of the hand  LYMPH: No cervical or supraclavicular lymphadenopathy noted  SKIN: No rashes or lesions none

## 2025-05-20 ENCOUNTER — OFFICE (OUTPATIENT)
Dept: URBAN - METROPOLITAN AREA CLINIC 6 | Facility: CLINIC | Age: 75
Setting detail: OPHTHALMOLOGY
End: 2025-05-20
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
## 2025-05-20 PROCEDURE — NO SHOW FE NO SHOW FEE: Performed by: OPHTHALMOLOGY

## 2025-06-02 NOTE — ED ADULT NURSE NOTE - SUICIDE SCREENING QUESTION 3
Detail Level: Generalized Nicotinamide Supplementation Recommendations: Heliocare Advanced Daily Sunscreen Recommendations: Eryfotona Actinica or Eryfotona Ageless can improve appearance of skin and slow/decrease growth or pre-cancerous lesions Detail Level: Zone Topical Retinoids Recommendations: Pea sized amount of retinoid in evenings (at least 10 mins after washing face) as tolerated Bleaching Agents Recommendations: Hydroquinone should be used 3 months on 3 months off\\nIf used continually it can cause pigmentation in the skin that will never resolve Chemical Peel Recommendations: VI peel Ipl Recommendations: Consult with  Sunscreen Recommendations: SPF 30+ or higher daily Skin Checks Recommendations: monthly Sunscreen Recommendations: Eryfotona Actinica Daily \\n(if prolonged sun exposure reapply with any SPF 30+ sunscreen) Detail Level: Detailed No

## 2025-06-17 NOTE — DISCHARGE NOTE ADULT - INSTRUCTIONS
"   06/17/25 0500   Appointment Info   Signing clinician's name / credentials Billy Akbar, PT, DPT   Total/Authorized Visits 6   Visits Used 6   Medical Diagnosis Chronic low back pain without sciatica   Progress Note/Certification   Start of Care Date 05/19/25   Onset of illness/injury or Date of Surgery 05/07/25  (date of referral, been dealing for a long time)   Therapy Frequency 1-2x/week   Predicted Duration 12 weeks   Certification date from 05/19/25   Certification date to 08/11/25   Progress Note Due Date 07/17/25   Progress Note Completed Date 06/17/25   GOALS   PT Goals 2;3;4   PT Goal 1   Goal Identifier Short Term Goal 1   Goal Description Patient will be able to bend down and touch toes with no increase in back pain.   Rationale to maximize safety and independence with performance of ADLs and functional tasks   Target Date 06/18/25   Date Met 06/10/25   PT Goal 2   Goal Identifier Short Term Goal 2   Goal Description Patient will be able to hold modified side plank on each side for 30 seconds at least max   Rationale to maximize safety and independence with performance of ADLs and functional tasks   Goal Progress 17 seconds both sides   Target Date 06/18/25   PT Goal 3   Goal Identifier Long Term Goal 1   Goal Description Patient will be able to rake or vacuum without more than 3/10 pain and no more than 1 break throughout the house.   Rationale to maximize safety and independence within the home   Goal Progress breaks it up \"because I can\"   Target Date 08/11/25   PT Goal 4   Goal Identifier Long Term Goal 2   Goal Description Patient will be able to turn over in bed at any point without an incerase in pain in the low back greater than 2/10   Rationale to maximize safety and independence with performance of ADLs and functional tasks   Target Date 08/11/25   Date Met 06/17/25   Subjective Report   Subjective Report No pain during the day anymore. Just feels when she is \"overdoing it\" which she does " not do anymore.   Objective Measures   Objective Measures Objective Measure 1;Objective Measure 2;Objective Measure 3   Objective Measure 1   Objective Measure Lumbar AROM   Details very minor, more of a stretch with SB B   Objective Measure 2   Objective Measure Rolling over   Details no pain   Objective Measure 3   Objective Measure Hip ROM   Details No pain   Treatment Interventions (PT)   Interventions Therapeutic Procedure/Exercise;Therapeutic Activity   Therapeutic Procedure/Exercise   Therapeutic Procedures: strength, endurance, ROM, flexibility minutes (93606) 10   Ther Proc 1 Treadmill x6 mins incline 5%. Reviewed HEP and what each stretch/exercise targets. Side planks for max effort.   Patient Response/Progress Patient did not have any more concerns   Education   Learner/Method Patient;Listening;Reading;Demonstration;Pictures/Video   Plan   Home program LTR in hooklying, seated lumbar flexion in sitting, standing lumbar extenison against wall, mod side plank, mod forearm plank, RDL, QL sretch   Plan for next session DC   Total Session Time   Timed Code Treatment Minutes 10   Total Treatment Time (sum of timed and untimed services) 10         DISCHARGE  Reason for Discharge: Patient has met all personal goals.  Patient chooses to discontinue therapy.    Discharge Plan: Patient to continue home program.    Referring Provider:  Jere Ye     Full fluid diet 4-5 servings daily...glucerna, farina,yogurt, thin soups without any content that requires chewing  NO MEAT, breads, pancakes,waffles etc...

## 2025-08-06 ENCOUNTER — OFFICE (OUTPATIENT)
Dept: URBAN - METROPOLITAN AREA CLINIC 6 | Facility: CLINIC | Age: 75
Setting detail: OPHTHALMOLOGY
End: 2025-08-06
Payer: COMMERCIAL

## 2025-08-06 DIAGNOSIS — Z96.1: ICD-10-CM

## 2025-08-06 DIAGNOSIS — H26.492: ICD-10-CM

## 2025-08-06 DIAGNOSIS — H35.373: ICD-10-CM

## 2025-08-06 DIAGNOSIS — H34.12: ICD-10-CM

## 2025-08-06 PROCEDURE — 92134 CPTRZ OPH DX IMG PST SGM RTA: CPT | Performed by: OPHTHALMOLOGY

## 2025-08-06 PROCEDURE — 99213 OFFICE O/P EST LOW 20 MIN: CPT | Performed by: OPHTHALMOLOGY

## 2025-08-06 ASSESSMENT — REFRACTION_CURRENTRX
OD_OVR_VA: 20/
OS_VPRISM_DIRECTION: PROGS
OS_ADD: +2.25
OS_OVR_VA: 20/
OD_SPHERE: -0.25
OD_ADD: +2.25
OS_CYLINDER: -2.50
OS_AXIS: 155
OD_AXIS: 155
OD_VPRISM_DIRECTION: PROGS
OS_SPHERE: -0.25
OD_CYLINDER: -0.50

## 2025-08-06 ASSESSMENT — REFRACTION_AUTOREFRACTION
OS_SPHERE: +1.75
OD_AXIS: 151
OS_AXIS: 040
OS_CYLINDER: -1.50
OD_SPHERE: PLANO
OD_CYLINDER: -0.50

## 2025-08-06 ASSESSMENT — REFRACTION_MANIFEST
OS_VA1: 20/NLP
OS_SPHERE: +0.75
OD_AXIS: 170
OD_SPHERE: +0.25
OS_CYLINDER: -0.75
OD_VA1: 20/20-1
OD_ADD: +2.50
OS_AXIS: 055
OD_CYLINDER: -0.75

## 2025-08-06 ASSESSMENT — KERATOMETRY
OD_AXISANGLE_DEGREES: 094
OS_AXISANGLE_DEGREES: 120
OD_K1POWER_DIOPTERS: 43.50
OS_K2POWER_DIOPTERS: 43.75
OD_K2POWER_DIOPTERS: 44.00
METHOD_AUTO_MANUAL: AUTO
OS_K1POWER_DIOPTERS: 43.00

## 2025-08-06 ASSESSMENT — TONOMETRY
OD_IOP_MMHG: 13
OS_IOP_MMHG: 15

## 2025-08-06 ASSESSMENT — VISUAL ACUITY
OS_BCVA: 20/30-2
OD_BCVA: NLP